# Patient Record
Sex: FEMALE | Race: BLACK OR AFRICAN AMERICAN | NOT HISPANIC OR LATINO
[De-identification: names, ages, dates, MRNs, and addresses within clinical notes are randomized per-mention and may not be internally consistent; named-entity substitution may affect disease eponyms.]

---

## 2017-03-15 ENCOUNTER — APPOINTMENT (OUTPATIENT)
Dept: OBGYN | Facility: CLINIC | Age: 27
End: 2017-03-15

## 2017-03-15 ENCOUNTER — APPOINTMENT (OUTPATIENT)
Dept: INTERNAL MEDICINE | Facility: CLINIC | Age: 27
End: 2017-03-15

## 2017-03-31 ENCOUNTER — OUTPATIENT (OUTPATIENT)
Dept: OUTPATIENT SERVICES | Facility: HOSPITAL | Age: 27
LOS: 1 days | Discharge: HOME | End: 2017-03-31

## 2017-03-31 ENCOUNTER — APPOINTMENT (OUTPATIENT)
Dept: OBGYN | Facility: CLINIC | Age: 27
End: 2017-03-31

## 2017-03-31 VITALS
BODY MASS INDEX: 27 KG/M2 | SYSTOLIC BLOOD PRESSURE: 98 MMHG | HEIGHT: 66 IN | DIASTOLIC BLOOD PRESSURE: 56 MMHG | WEIGHT: 168 LBS

## 2017-04-03 ENCOUNTER — RESULT REVIEW (OUTPATIENT)
Age: 27
End: 2017-04-03

## 2017-04-12 ENCOUNTER — APPOINTMENT (OUTPATIENT)
Dept: INTERNAL MEDICINE | Facility: CLINIC | Age: 27
End: 2017-04-12

## 2017-05-22 ENCOUNTER — APPOINTMENT (OUTPATIENT)
Dept: OBGYN | Facility: CLINIC | Age: 27
End: 2017-05-22

## 2017-05-22 ENCOUNTER — OUTPATIENT (OUTPATIENT)
Dept: OUTPATIENT SERVICES | Facility: HOSPITAL | Age: 27
LOS: 1 days | Discharge: HOME | End: 2017-05-22

## 2017-05-22 VITALS
HEIGHT: 66 IN | SYSTOLIC BLOOD PRESSURE: 100 MMHG | DIASTOLIC BLOOD PRESSURE: 60 MMHG | WEIGHT: 163 LBS | BODY MASS INDEX: 26.2 KG/M2

## 2017-05-26 LAB
A VAGINAE DNA VAG NAA+PROBE-LOG#: 6.8
BV SCORE VAG QL NAA+PROBE: ABNORMAL
C GLABRATA DNA VAG QL NAA+PROBE: NOT DETECTED
C PARAP DNA VAG QL NAA+PROBE: NOT DETECTED
C TRACH RRNA SPEC QL NAA+PROBE: NOT DETECTED
C TROPICLS DNA VAG QL NAA+PROBE: NOT DETECTED
CANDIDA DNA VAG QL NAA+PROBE: DETECTED
G VAGINALIS DNA VAG NAA+PROBE-LOG#: >8
LACTOBACILLUS DNA VAG NAA+PROBE-LOG#: NOT DETECTED
MEGASPHAERA SP DNA VAG NAA+PROBE-LOG#: NOT DETECTED
N GONORRHOEA RRNA SPEC QL NAA+PROBE: NOT DETECTED
T VAGINALIS RRNA SPEC QL NAA+PROBE: NOT DETECTED

## 2017-06-28 DIAGNOSIS — B37.3 CANDIDIASIS OF VULVA AND VAGINA: ICD-10-CM

## 2017-12-12 PROBLEM — Z00.00 ENCOUNTER FOR PREVENTIVE HEALTH EXAMINATION: Status: ACTIVE | Noted: 2017-12-12

## 2017-12-13 DIAGNOSIS — D25.9 LEIOMYOMA OF UTERUS, UNSPECIFIED: ICD-10-CM

## 2017-12-15 ENCOUNTER — APPOINTMENT (OUTPATIENT)
Dept: HUMAN REPRODUCTION | Facility: CLINIC | Age: 27
End: 2017-12-15

## 2017-12-26 ENCOUNTER — EMERGENCY (EMERGENCY)
Facility: HOSPITAL | Age: 27
LOS: 0 days | Discharge: HOME | End: 2017-12-27

## 2017-12-26 DIAGNOSIS — B37.3 CANDIDIASIS OF VULVA AND VAGINA: ICD-10-CM

## 2017-12-26 DIAGNOSIS — L29.8 OTHER PRURITUS: ICD-10-CM

## 2018-04-06 ENCOUNTER — OUTPATIENT (OUTPATIENT)
Dept: OUTPATIENT SERVICES | Facility: HOSPITAL | Age: 28
LOS: 1 days | Discharge: HOME | End: 2018-04-06

## 2018-04-06 ENCOUNTER — APPOINTMENT (OUTPATIENT)
Dept: OBGYN | Facility: CLINIC | Age: 28
End: 2018-04-06

## 2018-04-06 VITALS
HEIGHT: 66 IN | DIASTOLIC BLOOD PRESSURE: 66 MMHG | BODY MASS INDEX: 25.75 KG/M2 | WEIGHT: 160.25 LBS | SYSTOLIC BLOOD PRESSURE: 106 MMHG

## 2018-04-06 RX ORDER — FENUGREEK SEED/BL.THISTLE/ANIS 340 MG
18-0.8 & 29 CAPSULE ORAL
Qty: 90 | Refills: 3 | Status: ACTIVE | COMMUNITY
Start: 2018-04-06 | End: 1900-01-01

## 2018-04-09 DIAGNOSIS — Z01.419 ENCOUNTER FOR GYNECOLOGICAL EXAMINATION (GENERAL) (ROUTINE) WITHOUT ABNORMAL FINDINGS: ICD-10-CM

## 2018-04-09 DIAGNOSIS — D25.9 LEIOMYOMA OF UTERUS, UNSPECIFIED: ICD-10-CM

## 2018-04-20 ENCOUNTER — OUTPATIENT (OUTPATIENT)
Dept: OUTPATIENT SERVICES | Facility: HOSPITAL | Age: 28
LOS: 1 days | Discharge: HOME | End: 2018-04-20

## 2018-04-20 ENCOUNTER — APPOINTMENT (OUTPATIENT)
Dept: ANTEPARTUM | Facility: CLINIC | Age: 28
End: 2018-04-20

## 2018-04-20 VITALS
BODY MASS INDEX: 26.15 KG/M2 | TEMPERATURE: 97 F | HEART RATE: 77 BPM | OXYGEN SATURATION: 98 % | WEIGHT: 162 LBS | DIASTOLIC BLOOD PRESSURE: 69 MMHG | SYSTOLIC BLOOD PRESSURE: 115 MMHG

## 2018-04-20 DIAGNOSIS — D25.9 LEIOMYOMA OF UTERUS, UNSPECIFIED: ICD-10-CM

## 2018-04-20 DIAGNOSIS — N83.209 UNSPECIFIED OVARIAN CYST, UNSPECIFIED SIDE: ICD-10-CM

## 2018-04-20 LAB
HCG UR QL: NEGATIVE
QUALITY CONTROL: YES

## 2018-04-30 LAB — PROGEST SERPL-MCNC: 15.7 NG/ML

## 2018-06-21 ENCOUNTER — APPOINTMENT (OUTPATIENT)
Dept: INTERNAL MEDICINE | Facility: CLINIC | Age: 28
End: 2018-06-21

## 2018-06-21 ENCOUNTER — OUTPATIENT (OUTPATIENT)
Dept: OUTPATIENT SERVICES | Facility: HOSPITAL | Age: 28
LOS: 1 days | Discharge: HOME | End: 2018-06-21

## 2018-06-21 VITALS
SYSTOLIC BLOOD PRESSURE: 111 MMHG | HEIGHT: 66 IN | WEIGHT: 164 LBS | BODY MASS INDEX: 26.36 KG/M2 | HEART RATE: 94 BPM | DIASTOLIC BLOOD PRESSURE: 72 MMHG | TEMPERATURE: 98 F

## 2018-06-21 NOTE — ASSESSMENT
[FreeTextEntry1] : 28 y/o female with only a history of uterine fibroids, here for health maintenance visit.\par \par #Fibroids\par - asymptomatic currently\par - follows up with  and has an appointment coming up\par \par #HCM\par - pt family risk factors assessed\par - will order routine blood work\par - pt advised to come back in 9 to 12 months unless acute issue comes up\par

## 2018-06-21 NOTE — HISTORY OF PRESENT ILLNESS
[de-identified] : 26 y/o female with no pmh presents for evaluation and establishment of care with a pmd.  Patient has no complaints and currently follows up with a gynecologist for fibroids that she has had in the past.  No current issues otherwise.

## 2018-06-21 NOTE — PHYSICAL EXAM
[No Acute Distress] : no acute distress [Well Nourished] : well nourished [No JVD] : no jugular venous distention [Supple] : supple [No Respiratory Distress] : no respiratory distress  [Clear to Auscultation] : lungs were clear to auscultation bilaterally [Normal Rate] : normal rate  [Regular Rhythm] : with a regular rhythm [Normal S1, S2] : normal S1 and S2 [Soft] : abdomen soft [Non Tender] : non-tender [Normal Bowel Sounds] : normal bowel sounds [No CVA Tenderness] : no CVA  tenderness

## 2018-06-22 ENCOUNTER — LABORATORY RESULT (OUTPATIENT)
Age: 28
End: 2018-06-22

## 2018-06-22 DIAGNOSIS — Z00.00 ENCOUNTER FOR GENERAL ADULT MEDICAL EXAMINATION WITHOUT ABNORMAL FINDINGS: ICD-10-CM

## 2018-06-28 LAB — 25(OH)D3 SERPL-MCNC: 26 NG/ML

## 2018-07-06 ENCOUNTER — OUTPATIENT (OUTPATIENT)
Dept: OUTPATIENT SERVICES | Facility: HOSPITAL | Age: 28
LOS: 1 days | Discharge: HOME | End: 2018-07-06

## 2018-07-06 ENCOUNTER — APPOINTMENT (OUTPATIENT)
Dept: OBGYN | Facility: CLINIC | Age: 28
End: 2018-07-06

## 2018-07-06 ENCOUNTER — RESULT CHARGE (OUTPATIENT)
Age: 28
End: 2018-07-06

## 2018-07-06 VITALS — BODY MASS INDEX: 26.63 KG/M2 | WEIGHT: 165 LBS | DIASTOLIC BLOOD PRESSURE: 60 MMHG | SYSTOLIC BLOOD PRESSURE: 100 MMHG

## 2018-07-06 LAB — HCG UR QL: NEGATIVE

## 2018-07-10 DIAGNOSIS — D25.9 LEIOMYOMA OF UTERUS, UNSPECIFIED: ICD-10-CM

## 2018-07-12 ENCOUNTER — APPOINTMENT (OUTPATIENT)
Dept: OBGYN | Facility: CLINIC | Age: 28
End: 2018-07-12

## 2018-07-12 ENCOUNTER — OUTPATIENT (OUTPATIENT)
Dept: OUTPATIENT SERVICES | Facility: HOSPITAL | Age: 28
LOS: 1 days | Discharge: HOME | End: 2018-07-12

## 2018-07-12 VITALS
BODY MASS INDEX: 26.96 KG/M2 | DIASTOLIC BLOOD PRESSURE: 64 MMHG | SYSTOLIC BLOOD PRESSURE: 100 MMHG | WEIGHT: 167.06 LBS

## 2018-07-12 DIAGNOSIS — Z01.419 ENCOUNTER FOR GYNECOLOGICAL EXAMINATION (GENERAL) (ROUTINE) W/OUT ABNORMAL FINDINGS: ICD-10-CM

## 2018-07-12 DIAGNOSIS — Z78.9 OTHER SPECIFIED HEALTH STATUS: ICD-10-CM

## 2018-07-12 DIAGNOSIS — Z87.42 PERSONAL HISTORY OF OTHER DISEASES OF THE FEMALE GENITAL TRACT: ICD-10-CM

## 2018-07-12 DIAGNOSIS — Z86.19 PERSONAL HISTORY OF OTHER INFECTIOUS AND PARASITIC DISEASES: ICD-10-CM

## 2018-07-12 DIAGNOSIS — Z87.2 PERSONAL HISTORY OF DISEASES OF THE SKIN AND SUBCUTANEOUS TISSUE: ICD-10-CM

## 2018-07-16 DIAGNOSIS — N92.0 EXCESSIVE AND FREQUENT MENSTRUATION WITH REGULAR CYCLE: ICD-10-CM

## 2018-07-16 DIAGNOSIS — D25.9 LEIOMYOMA OF UTERUS, UNSPECIFIED: ICD-10-CM

## 2018-07-17 ENCOUNTER — OUTPATIENT (OUTPATIENT)
Dept: OUTPATIENT SERVICES | Facility: HOSPITAL | Age: 28
LOS: 1 days | Discharge: HOME | End: 2018-07-17

## 2018-07-17 VITALS
SYSTOLIC BLOOD PRESSURE: 113 MMHG | WEIGHT: 167.33 LBS | TEMPERATURE: 98 F | DIASTOLIC BLOOD PRESSURE: 68 MMHG | HEIGHT: 66 IN | OXYGEN SATURATION: 100 % | HEART RATE: 81 BPM

## 2018-07-17 DIAGNOSIS — Z01.818 ENCOUNTER FOR OTHER PREPROCEDURAL EXAMINATION: ICD-10-CM

## 2018-07-17 DIAGNOSIS — N82.0 VESICOVAGINAL FISTULA: ICD-10-CM

## 2018-07-17 DIAGNOSIS — D25.9 LEIOMYOMA OF UTERUS, UNSPECIFIED: ICD-10-CM

## 2018-07-17 LAB
ALBUMIN SERPL ELPH-MCNC: 4.6 G/DL — SIGNIFICANT CHANGE UP (ref 3.5–5.2)
ALP SERPL-CCNC: 41 U/L — SIGNIFICANT CHANGE UP (ref 30–115)
ALT FLD-CCNC: 11 U/L — SIGNIFICANT CHANGE UP (ref 0–41)
ANION GAP SERPL CALC-SCNC: 12 MMOL/L — SIGNIFICANT CHANGE UP (ref 7–14)
APPEARANCE UR: CLEAR — SIGNIFICANT CHANGE UP
APTT BLD: 34.4 SEC — SIGNIFICANT CHANGE UP (ref 27–39.2)
AST SERPL-CCNC: 18 U/L — SIGNIFICANT CHANGE UP (ref 0–41)
BILIRUB SERPL-MCNC: 0.2 MG/DL — SIGNIFICANT CHANGE UP (ref 0.2–1.2)
BILIRUB UR-MCNC: NEGATIVE — SIGNIFICANT CHANGE UP
BUN SERPL-MCNC: 15 MG/DL — SIGNIFICANT CHANGE UP (ref 10–20)
CALCIUM SERPL-MCNC: 9.7 MG/DL — SIGNIFICANT CHANGE UP (ref 8.5–10.1)
CHLORIDE SERPL-SCNC: 101 MMOL/L — SIGNIFICANT CHANGE UP (ref 98–110)
CO2 SERPL-SCNC: 28 MMOL/L — SIGNIFICANT CHANGE UP (ref 17–32)
COLOR SPEC: YELLOW — SIGNIFICANT CHANGE UP
CREAT SERPL-MCNC: 0.6 MG/DL — LOW (ref 0.7–1.5)
DIFF PNL FLD: NEGATIVE — SIGNIFICANT CHANGE UP
GLUCOSE SERPL-MCNC: 86 MG/DL — SIGNIFICANT CHANGE UP (ref 70–99)
GLUCOSE UR QL: NEGATIVE MG/DL — SIGNIFICANT CHANGE UP
HCT VFR BLD CALC: 35.2 % — LOW (ref 37–47)
HGB BLD-MCNC: 11.3 G/DL — LOW (ref 12–16)
INR BLD: 1.17 RATIO — SIGNIFICANT CHANGE UP (ref 0.65–1.3)
KETONES UR-MCNC: NEGATIVE — SIGNIFICANT CHANGE UP
LEUKOCYTE ESTERASE UR-ACNC: NEGATIVE — SIGNIFICANT CHANGE UP
MCHC RBC-ENTMCNC: 26.1 PG — LOW (ref 27–31)
MCHC RBC-ENTMCNC: 32.1 G/DL — SIGNIFICANT CHANGE UP (ref 32–37)
MCV RBC AUTO: 81.3 FL — SIGNIFICANT CHANGE UP (ref 81–99)
NITRITE UR-MCNC: NEGATIVE — SIGNIFICANT CHANGE UP
NRBC # BLD: 0 /100 WBCS — SIGNIFICANT CHANGE UP (ref 0–0)
PH UR: 6 — SIGNIFICANT CHANGE UP (ref 5–8)
PLATELET # BLD AUTO: 327 K/UL — SIGNIFICANT CHANGE UP (ref 130–400)
POTASSIUM SERPL-MCNC: 4.6 MMOL/L — SIGNIFICANT CHANGE UP (ref 3.5–5)
POTASSIUM SERPL-SCNC: 4.6 MMOL/L — SIGNIFICANT CHANGE UP (ref 3.5–5)
PROT SERPL-MCNC: 7 G/DL — SIGNIFICANT CHANGE UP (ref 6–8)
PROT UR-MCNC: NEGATIVE MG/DL — SIGNIFICANT CHANGE UP
PROTHROM AB SERPL-ACNC: 12.6 SEC — SIGNIFICANT CHANGE UP (ref 9.95–12.87)
RBC # BLD: 4.33 M/UL — SIGNIFICANT CHANGE UP (ref 4.2–5.4)
RBC # FLD: 12.4 % — SIGNIFICANT CHANGE UP (ref 11.5–14.5)
SODIUM SERPL-SCNC: 141 MMOL/L — SIGNIFICANT CHANGE UP (ref 135–146)
SP GR SPEC: 1.01 — SIGNIFICANT CHANGE UP (ref 1.01–1.03)
UROBILINOGEN FLD QL: 0.2 MG/DL — SIGNIFICANT CHANGE UP (ref 0.2–0.2)
WBC # BLD: 5.91 K/UL — SIGNIFICANT CHANGE UP (ref 4.8–10.8)
WBC # FLD AUTO: 5.91 K/UL — SIGNIFICANT CHANGE UP (ref 4.8–10.8)

## 2018-07-17 NOTE — H&P PST ADULT - ATTENDING COMMENTS
Risks, benefit and alternatives discussed in details. All questions answered. Patient verbalized understanding.  On call for operative hysteroscopy/ Myosure myomectomy, curettage. On call for operative hysteroscopy/ Myosure myomectomy, curettage.  Patient declines blood transfusion, excessive blood loss not anticipated.  Risks, benefit and alternatives discussed in details. All questions answered. Patient verbalized understanding.

## 2018-07-17 NOTE — H&P PST ADULT - HISTORY OF PRESENT ILLNESS
27 year old female found out in 2016 with fibroid was having pain in lower abdomen. patient states it comes and goes ,and is trying to get pregnant so needs fibroid removed.  fos= 2-3 denies chest pain sob palp  denies recent uri or uti  PT DENEIS ANY RASHES, ABRASION, OR OPEN WOUNDS OR CUTS

## 2018-07-17 NOTE — H&P PST ADULT - NSANTHOSAYNRD_GEN_A_CORE
No. RODDY screening performed.  STOP BANG Legend: 0-2 = LOW Risk; 3-4 = INTERMEDIATE Risk; 5-8 = HIGH Risk/see roddy tool

## 2018-07-18 LAB
T3 SERPL-MCNC: 110 NG/DL — SIGNIFICANT CHANGE UP (ref 80–200)
T4 AB SER-ACNC: 6.9 UG/DL — SIGNIFICANT CHANGE UP (ref 4.6–12)
TSH SERPL-MCNC: 2.96 UIU/ML — SIGNIFICANT CHANGE UP (ref 0.27–4.2)

## 2018-07-19 ENCOUNTER — OUTPATIENT (OUTPATIENT)
Dept: OUTPATIENT SERVICES | Facility: HOSPITAL | Age: 28
LOS: 1 days | Discharge: HOME | End: 2018-07-19

## 2018-07-19 DIAGNOSIS — D25.9 LEIOMYOMA OF UTERUS, UNSPECIFIED: ICD-10-CM

## 2018-07-23 ENCOUNTER — OUTPATIENT (OUTPATIENT)
Dept: OUTPATIENT SERVICES | Facility: HOSPITAL | Age: 28
LOS: 1 days | Discharge: HOME | End: 2018-07-23

## 2018-07-23 ENCOUNTER — RESULT REVIEW (OUTPATIENT)
Age: 28
End: 2018-07-23

## 2018-07-23 VITALS
DIASTOLIC BLOOD PRESSURE: 70 MMHG | SYSTOLIC BLOOD PRESSURE: 118 MMHG | RESPIRATION RATE: 18 BRPM | HEART RATE: 70 BPM | OXYGEN SATURATION: 100 %

## 2018-07-23 VITALS — HEIGHT: 66 IN | WEIGHT: 166.89 LBS

## 2018-07-23 RX ORDER — HYDROMORPHONE HYDROCHLORIDE 2 MG/ML
0.5 INJECTION INTRAMUSCULAR; INTRAVENOUS; SUBCUTANEOUS
Qty: 0 | Refills: 0 | Status: DISCONTINUED | OUTPATIENT
Start: 2018-07-23 | End: 2018-07-30

## 2018-07-23 RX ORDER — SODIUM CHLORIDE 9 MG/ML
1000 INJECTION, SOLUTION INTRAVENOUS
Qty: 0 | Refills: 0 | Status: DISCONTINUED | OUTPATIENT
Start: 2018-07-23 | End: 2018-08-07

## 2018-07-23 RX ORDER — OXYCODONE HYDROCHLORIDE 5 MG/1
5 TABLET ORAL ONCE
Qty: 0 | Refills: 0 | Status: DISCONTINUED | OUTPATIENT
Start: 2018-07-23 | End: 2018-07-23

## 2018-07-23 RX ORDER — ONDANSETRON 8 MG/1
4 TABLET, FILM COATED ORAL ONCE
Qty: 0 | Refills: 0 | Status: DISCONTINUED | OUTPATIENT
Start: 2018-07-23 | End: 2018-08-07

## 2018-07-23 RX ORDER — KETOROLAC TROMETHAMINE 30 MG/ML
30 SYRINGE (ML) INJECTION ONCE
Qty: 0 | Refills: 0 | Status: DISCONTINUED | OUTPATIENT
Start: 2018-07-23 | End: 2018-07-23

## 2018-07-23 NOTE — ASU DISCHARGE PLAN (ADULT/PEDIATRIC). - NURSING INSTRUCTIONS
- Nothing in the vagina for 6 weeks; no sex, no tampons, no douching  - follow up with PMD in 10-14 days  - In case of fever, excessive bleeding or severe abdominal pain, please go to the emergency department

## 2018-07-23 NOTE — ASU DISCHARGE PLAN (ADULT/PEDIATRIC). - ASU FOLLOWUP
911 or go to the nearest Emergency Room [facility]:  [place] [facility]...

## 2018-07-23 NOTE — BRIEF OPERATIVE NOTE - PROCEDURE
<<-----Click on this checkbox to enter Procedure Diagnostic hysteroscopy with dilation and curettage of uterus  07/23/2018    Active  HIMA

## 2018-07-23 NOTE — BRIEF OPERATIVE NOTE - COMMENTS
D/c home when patient awake, alert and is cleared by the PACU team D/c home when patient awake, alert and is cleared by the PACU team    Dictation number:[id number]

## 2018-07-24 PROBLEM — D25.9 LEIOMYOMA OF UTERUS, UNSPECIFIED: Chronic | Status: ACTIVE | Noted: 2018-07-17

## 2018-07-24 LAB — SURGICAL PATHOLOGY STUDY: SIGNIFICANT CHANGE UP

## 2018-07-26 DIAGNOSIS — N93.9 ABNORMAL UTERINE AND VAGINAL BLEEDING, UNSPECIFIED: ICD-10-CM

## 2018-07-31 ENCOUNTER — APPOINTMENT (OUTPATIENT)
Dept: OBGYN | Facility: CLINIC | Age: 28
End: 2018-07-31

## 2018-07-31 VITALS
SYSTOLIC BLOOD PRESSURE: 110 MMHG | BODY MASS INDEX: 27.08 KG/M2 | DIASTOLIC BLOOD PRESSURE: 70 MMHG | HEIGHT: 66 IN | WEIGHT: 168.5 LBS

## 2018-07-31 LAB
CHOLEST SERPL-MCNC: 127 MG/DL
CHOLEST/HDLC SERPL: 2.4 RATIO
HDLC SERPL-MCNC: 53 MG/DL
LDLC SERPL CALC-MCNC: 70 MG/DL
TRIGL SERPL-MCNC: 35 MG/DL

## 2018-08-23 ENCOUNTER — OUTPATIENT (OUTPATIENT)
Dept: OUTPATIENT SERVICES | Facility: HOSPITAL | Age: 28
LOS: 1 days | Discharge: HOME | End: 2018-08-23

## 2018-11-13 ENCOUNTER — EMERGENCY (EMERGENCY)
Facility: HOSPITAL | Age: 28
LOS: 0 days | Discharge: HOME | End: 2018-11-13
Attending: EMERGENCY MEDICINE | Admitting: EMERGENCY MEDICINE

## 2018-11-13 VITALS — SYSTOLIC BLOOD PRESSURE: 114 MMHG | HEART RATE: 81 BPM | DIASTOLIC BLOOD PRESSURE: 65 MMHG

## 2018-11-13 VITALS
DIASTOLIC BLOOD PRESSURE: 74 MMHG | OXYGEN SATURATION: 95 % | TEMPERATURE: 98 F | RESPIRATION RATE: 18 BRPM | SYSTOLIC BLOOD PRESSURE: 130 MMHG | HEART RATE: 104 BPM

## 2018-11-13 DIAGNOSIS — N83.209 UNSPECIFIED OVARIAN CYST, UNSPECIFIED SIDE: ICD-10-CM

## 2018-11-13 DIAGNOSIS — D25.9 LEIOMYOMA OF UTERUS, UNSPECIFIED: ICD-10-CM

## 2018-11-13 DIAGNOSIS — R10.9 UNSPECIFIED ABDOMINAL PAIN: ICD-10-CM

## 2018-11-13 LAB
ALBUMIN SERPL ELPH-MCNC: 4.5 G/DL — SIGNIFICANT CHANGE UP (ref 3.5–5.2)
ALP SERPL-CCNC: 40 U/L — SIGNIFICANT CHANGE UP (ref 30–115)
ALT FLD-CCNC: 9 U/L — SIGNIFICANT CHANGE UP (ref 0–41)
ANION GAP SERPL CALC-SCNC: 11 MMOL/L — SIGNIFICANT CHANGE UP (ref 7–14)
APPEARANCE UR: ABNORMAL
AST SERPL-CCNC: 17 U/L — SIGNIFICANT CHANGE UP (ref 0–41)
BASOPHILS # BLD AUTO: 0.04 K/UL — SIGNIFICANT CHANGE UP (ref 0–0.2)
BASOPHILS NFR BLD AUTO: 0.7 % — SIGNIFICANT CHANGE UP (ref 0–1)
BILIRUB SERPL-MCNC: 0.3 MG/DL — SIGNIFICANT CHANGE UP (ref 0.2–1.2)
BILIRUB UR-MCNC: NEGATIVE — SIGNIFICANT CHANGE UP
BUN SERPL-MCNC: 8 MG/DL — LOW (ref 10–20)
CALCIUM SERPL-MCNC: 9.6 MG/DL — SIGNIFICANT CHANGE UP (ref 8.5–10.1)
CHLORIDE SERPL-SCNC: 107 MMOL/L — SIGNIFICANT CHANGE UP (ref 98–110)
CO2 SERPL-SCNC: 26 MMOL/L — SIGNIFICANT CHANGE UP (ref 17–32)
COLOR SPEC: YELLOW — SIGNIFICANT CHANGE UP
CREAT SERPL-MCNC: 0.6 MG/DL — LOW (ref 0.7–1.5)
DIFF PNL FLD: NEGATIVE — SIGNIFICANT CHANGE UP
EOSINOPHIL # BLD AUTO: 0.05 K/UL — SIGNIFICANT CHANGE UP (ref 0–0.7)
EOSINOPHIL NFR BLD AUTO: 0.9 % — SIGNIFICANT CHANGE UP (ref 0–8)
EPI CELLS # UR: ABNORMAL /HPF
GLUCOSE SERPL-MCNC: 90 MG/DL — SIGNIFICANT CHANGE UP (ref 70–99)
GLUCOSE UR QL: NEGATIVE MG/DL — SIGNIFICANT CHANGE UP
HCT VFR BLD CALC: 34 % — LOW (ref 37–47)
HGB BLD-MCNC: 11 G/DL — LOW (ref 12–16)
IMM GRANULOCYTES NFR BLD AUTO: 0 % — LOW (ref 0.1–0.3)
KETONES UR-MCNC: NEGATIVE — SIGNIFICANT CHANGE UP
LEUKOCYTE ESTERASE UR-ACNC: NEGATIVE — SIGNIFICANT CHANGE UP
LIDOCAIN IGE QN: 25 U/L — SIGNIFICANT CHANGE UP (ref 7–60)
LYMPHOCYTES # BLD AUTO: 2.24 K/UL — SIGNIFICANT CHANGE UP (ref 1.2–3.4)
LYMPHOCYTES # BLD AUTO: 40.5 % — SIGNIFICANT CHANGE UP (ref 20.5–51.1)
MCHC RBC-ENTMCNC: 26.1 PG — LOW (ref 27–31)
MCHC RBC-ENTMCNC: 32.4 G/DL — SIGNIFICANT CHANGE UP (ref 32–37)
MCV RBC AUTO: 80.8 FL — LOW (ref 81–99)
MONOCYTES # BLD AUTO: 0.58 K/UL — SIGNIFICANT CHANGE UP (ref 0.1–0.6)
MONOCYTES NFR BLD AUTO: 10.5 % — HIGH (ref 1.7–9.3)
NEUTROPHILS # BLD AUTO: 2.62 K/UL — SIGNIFICANT CHANGE UP (ref 1.4–6.5)
NEUTROPHILS NFR BLD AUTO: 47.4 % — SIGNIFICANT CHANGE UP (ref 42.2–75.2)
NITRITE UR-MCNC: NEGATIVE — SIGNIFICANT CHANGE UP
NRBC # BLD: 0 /100 WBCS — SIGNIFICANT CHANGE UP (ref 0–0)
PH UR: 7 — SIGNIFICANT CHANGE UP (ref 5–8)
PLATELET # BLD AUTO: 246 K/UL — SIGNIFICANT CHANGE UP (ref 130–400)
POTASSIUM SERPL-MCNC: 4.6 MMOL/L — SIGNIFICANT CHANGE UP (ref 3.5–5)
POTASSIUM SERPL-SCNC: 4.6 MMOL/L — SIGNIFICANT CHANGE UP (ref 3.5–5)
PROT SERPL-MCNC: 6.9 G/DL — SIGNIFICANT CHANGE UP (ref 6–8)
PROT UR-MCNC: NEGATIVE MG/DL — SIGNIFICANT CHANGE UP
RBC # BLD: 4.21 M/UL — SIGNIFICANT CHANGE UP (ref 4.2–5.4)
RBC # FLD: 12.7 % — SIGNIFICANT CHANGE UP (ref 11.5–14.5)
SODIUM SERPL-SCNC: 144 MMOL/L — SIGNIFICANT CHANGE UP (ref 135–146)
SP GR SPEC: 1.01 — SIGNIFICANT CHANGE UP (ref 1.01–1.03)
UROBILINOGEN FLD QL: 0.2 MG/DL — SIGNIFICANT CHANGE UP (ref 0.2–0.2)
WBC # BLD: 5.53 K/UL — SIGNIFICANT CHANGE UP (ref 4.8–10.8)
WBC # FLD AUTO: 5.53 K/UL — SIGNIFICANT CHANGE UP (ref 4.8–10.8)

## 2018-11-13 NOTE — ED PROVIDER NOTE - PHYSICAL EXAMINATION
GEN: Alert & Oriented x 3, No acute distress. Calm, appropriate.  Head and Neck: Normocephalic, atraumatic. No cervical lymphadenopathy. Trachea midline. No thyromegaly.  ENT:Oral mucosa pink, moist without lesions. No pharyngeal injection noted. No exudate. TM clear bilaterally.  Eyes: PERRLA. EOMI. No conjunctival injection. No scleral icterus. Vision 20/20  RESP: Lungs clear to auscult bilat. no wheezes, rhonchi or rales. No retractions. Equal air entry.  CARDIO: regular rate and rhythm, no murmurs, rubs or gallops. Normal S1, S2.  Radial pulses 2+ bilaterally. No lower extremity edema.  ABD: Soft, Nondistended. BS +4Q. No rebound tenderness/guarding. No pulsatile mass. tenderness with light and deep palpation of RLQ and suprapubic area. Slight tenderness over LLQ.   MS: Full ROM of extremities.  SKIN: no rashes/lesions, no petechiae, no ecchymosis.  NEURO: CN II-XII grossly intact. Speech and cognition normal. GEN: Alert & Oriented x 3, No acute distress. Calm, appropriate.  Eyes: PERRL. No conjunctival injection. No scleral icterus.   RESP: Lungs clear to auscult bilat. no wheezes, rhonchi or rales. No retractions. Equal air entry.  CARDIO: regular rate and rhythm, no murmurs, rubs or gallops. Normal S1, S2. Radial pulses 2+ bilaterally.   ABD: Soft, Nondistended. BS +4Q. No rebound tenderness/guarding. No pulsatile mass. tenderness with light and deep palpation of RLQ and suprapubic area. Slight tenderness over LLQ. No CVA tenderness.  : no masses, lesions or swelling. Vaginal mucosa pink and moist with rugae present. No unusual odors. Cream colored discharge present. Cervix smooth, firm and mobile. (+) cervical motion tenderness. ovaries not palpable. No adnexal tenderness. Uterus not enlarged.   MS: Full ROM of extremities.  SKIN: no rashes/lesions, no petechiae, no ecchymosis.  NEURO: CN II-XII grossly intact. Speech and cognition normal.

## 2018-11-13 NOTE — ED PROVIDER NOTE - ATTENDING CONTRIBUTION TO CARE
28 y.o. Female, PMH of uterine fibroid, comes in c/o RLQ abdominal pain which started yesterday and has been getting worse. Denies n/v/d, f/c, CP/SOBm vaginal bleeding/discharge, hematuria or flank pain. LMP 9/3/18. On exam, pt in NAD, AAOx3, head NC/AT, CN II-XII intact, lungs CTA B/L, CV S1S2 regular, abdomen soft/(+) RLQ abdominal pain/ND/(+)BS,  as per PAs note, ext (-) edema, motor 5/5x4, sensation intact. Will do labs, US and reevaluate.

## 2018-11-13 NOTE — ED PROVIDER NOTE - PROGRESS NOTE DETAILS
Pt signed out to Dr. Sim pending labs, US, reevaluation, and dispo. I was directly involved in the care of this patient. PA Ebonie Lewis note/plan reviewed and agreed. ADDENDUM by Myra Sim M.D.: I received sign-off from Dr. Bernstein. 28yoF with h/o fibroids presents with RLQ cramping, exam suspicious for fibroids with low suspicion for appe, I was to f/u U/S and reassess. U/S shows fibroids and hemorrhagic cyst. Abdomen benign, NAD, well appearing, comfortable, denies dizziness or vomiting or vaginal bleeding. Discharged with return precautions, OB f/u.

## 2018-11-13 NOTE — ED PROVIDER NOTE - CARE PLAN
Principal Discharge DX:	Hemorrhagic ovarian cyst  Secondary Diagnosis:	Fibroids  Secondary Diagnosis:	RLQ abdominal pain

## 2018-11-13 NOTE — ED PROVIDER NOTE - OBJECTIVE STATEMENT
The patient is a 28y Female with PMH of uterine fibroid presenting to the ED with right lower quadrant pain x 1 day. Patient states she developed dull RLQ abdominal pain last night that has been increasing, non-radiating and cramping in nature. Notes some urinary urgency and constipation. She denies n/v/d, fever, chills, vaginal bleeding, vaginal discharge, hematuria, & flank pain. Her LMP was sept 3. She is sexually active with one male partner, she does not use any barrier protection or hormonal contraceptives. She is not concerned about sexually transmitted infections.

## 2018-11-13 NOTE — ED ADULT NURSE NOTE - NSIMPLEMENTINTERV_GEN_ALL_ED
Implemented All Universal Safety Interventions:  [place] to call system. Call bell, personal items and telephone within reach. Instruct patient to call for assistance. Room bathroom lighting operational. Non-slip footwear when patient is off stretcher. Physically safe environment: no spills, clutter or unnecessary equipment. Stretcher in lowest position, wheels locked, appropriate side rails in place.

## 2018-11-13 NOTE — ED PROVIDER NOTE - NS ED ROS FT
GEN: (-) fever, (-)chills, (-) malaise, (-) decreased appetite   HEENT: (-) vision changes, (-) HA, (-) sore throat, (-) ear pain, (-) epistaxis , (-) tinnitus   CV: (-) chest pain, (-) palpitations  PULM: (-) cough, (-) wheezing, (-) dyspnea  GI: (+) abdominal pain,(-) Nausea, (-) Vomiting, (-) Diarrhea, (-) Melena, (+) constipation  NEURO: (-) weakness, (-) paresthesias, (-) syncope  : (-) dysuria, (-) frequency, (+) urgency, (-) hematuria   MS: (-) back pain, (-) joint pain, (-)myalgias, (-) swelling  SKIN: (-) rashes, (-) new lesions, (-) jaundice  HEME: (-) bleeding, (-) ecchymosis

## 2019-01-27 NOTE — H&P PST ADULT - PAIN CHRONIC, PROFILE
no
Patient has chronic thrombocytopenia of unknown origin. It has ostensibly never been evaluated. I do not see it mentioned on notes from previous admissions, either.   - continue to monitor CBC

## 2019-01-29 ENCOUNTER — EMERGENCY (EMERGENCY)
Facility: HOSPITAL | Age: 29
LOS: 0 days | Discharge: HOME | End: 2019-01-29
Admitting: PHYSICIAN ASSISTANT

## 2019-01-29 VITALS
TEMPERATURE: 96 F | RESPIRATION RATE: 18 BRPM | SYSTOLIC BLOOD PRESSURE: 120 MMHG | HEART RATE: 98 BPM | WEIGHT: 164.91 LBS | DIASTOLIC BLOOD PRESSURE: 71 MMHG | OXYGEN SATURATION: 100 %
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
## 2019-01-29 DIAGNOSIS — Z04.1 ENCOUNTER FOR EXAMINATION AND OBSERVATION FOLLOWING TRANSPORT ACCIDENT: ICD-10-CM
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
## 2019-01-29 NOTE — ED PROVIDER NOTE - PHYSICAL EXAMINATION
GENERAL:  well appearing, non-toxic female in no acute distress  SKIN: skin warm, pink and dry. MMM. no signs of trauma. no seatbelt sign.   HEAD: NC, AT  NECK: Neck supple. No midline cervical tenderness. FROM of neck  PULM: CTAB. Normal respiratory effort. No respiratory distress. No wheezes, stridor, rales or rhonchi. No retractions  CV: RRR, no M/R/G.   ABD: Soft, non-tender, non-distended  MSK: FROM of all extremities.  No MSK tenderness. no midline vertebral tenderness. No edema, erythema, cyanosis. radial pulses equal and intact bilaterally.   NEURO: A+Ox3, no sensory/motor deficits, CN II-XII intact. No speech slurring, facial asymmetry. Normal finger-to-nose  b/l. 5/5 strength throughout. Normal gait.
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
## 2019-02-28 ENCOUNTER — OUTPATIENT (OUTPATIENT)
Dept: OUTPATIENT SERVICES | Facility: HOSPITAL | Age: 29
LOS: 1 days | Discharge: HOME | End: 2019-02-28

## 2019-02-28 ENCOUNTER — APPOINTMENT (OUTPATIENT)
Dept: OBGYN | Facility: CLINIC | Age: 29
End: 2019-02-28

## 2019-02-28 VITALS
WEIGHT: 172.19 LBS | DIASTOLIC BLOOD PRESSURE: 64 MMHG | HEIGHT: 66 IN | SYSTOLIC BLOOD PRESSURE: 118 MMHG | BODY MASS INDEX: 27.67 KG/M2

## 2019-02-28 DIAGNOSIS — D25.9 LEIOMYOMA OF UTERUS, UNSPECIFIED: ICD-10-CM

## 2019-02-28 NOTE — CHIEF COMPLAINT
[FreeTextEntry1] : Pt here for follow up. Pt has decided that she wants to proceed with evaluation for myomectomy. Pt's periods are regular. Pt and partner have been trying to conceive for over 1 year without success.

## 2019-03-14 ENCOUNTER — TRANSCRIPTION ENCOUNTER (OUTPATIENT)
Age: 29
End: 2019-03-14

## 2019-03-21 ENCOUNTER — OUTPATIENT (OUTPATIENT)
Dept: OUTPATIENT SERVICES | Facility: HOSPITAL | Age: 29
LOS: 1 days | Discharge: HOME | End: 2019-03-21

## 2019-03-21 DIAGNOSIS — D57.3 SICKLE-CELL TRAIT: ICD-10-CM

## 2019-04-22 ENCOUNTER — TRANSCRIPTION ENCOUNTER (OUTPATIENT)
Age: 29
End: 2019-04-22

## 2019-05-09 LAB
24R-OH-CALCIDIOL SERPL-MCNC: 58.5 PG/ML
ALBUMIN SERPL ELPH-MCNC: 4.7 G/DL
ALP BLD-CCNC: 39 U/L
ALT SERPL-CCNC: 8 U/L
ANION GAP SERPL CALC-SCNC: 13 MMOL/L
AST SERPL-CCNC: 18 U/L
BASOPHILS # BLD AUTO: 0.05 K/UL
BASOPHILS NFR BLD AUTO: 1.2 %
BILIRUB SERPL-MCNC: 0.4 MG/DL
BUN SERPL-MCNC: 6 MG/DL
CALCIUM SERPL-MCNC: 9.3 MG/DL
CHLORIDE SERPL-SCNC: 103 MMOL/L
CO2 SERPL-SCNC: 25 MMOL/L
CREAT SERPL-MCNC: 0.6 MG/DL
EOSINOPHIL # BLD AUTO: 0.05 K/UL
EOSINOPHIL NFR BLD AUTO: 1.2 %
GLUCOSE SERPL-MCNC: 77 MG/DL
HCT VFR BLD CALC: 36.2 %
HGB BLD-MCNC: 11.3 G/DL
IMM GRANULOCYTES NFR BLD AUTO: 0.2 %
LYMPHOCYTES # BLD AUTO: 2.1 K/UL
LYMPHOCYTES NFR BLD AUTO: 48.6 %
MAN DIFF?: NORMAL
MCHC RBC-ENTMCNC: 25.9 PG
MCHC RBC-ENTMCNC: 31.2 G/DL
MCV RBC AUTO: 82.8 FL
MONOCYTES # BLD AUTO: 0.43 K/UL
MONOCYTES NFR BLD AUTO: 10 %
NEUTROPHILS # BLD AUTO: 1.68 K/UL
NEUTROPHILS NFR BLD AUTO: 38.8 %
PLATELET # BLD AUTO: 303 K/UL
POTASSIUM SERPL-SCNC: 4.4 MMOL/L
PROT SERPL-MCNC: 7.2 G/DL
RBC # BLD: 4.37 M/UL
RBC # FLD: 12.4 %
SODIUM SERPL-SCNC: 141 MMOL/L
TSH SERPL-ACNC: 1.43 UIU/ML
WBC # FLD AUTO: 4.32 K/UL

## 2019-05-16 ENCOUNTER — APPOINTMENT (OUTPATIENT)
Dept: INTERNAL MEDICINE | Facility: CLINIC | Age: 29
End: 2019-05-16

## 2019-05-16 ENCOUNTER — OUTPATIENT (OUTPATIENT)
Dept: OUTPATIENT SERVICES | Facility: HOSPITAL | Age: 29
LOS: 1 days | Discharge: HOME | End: 2019-05-16

## 2019-05-16 VITALS
TEMPERATURE: 96.2 F | BODY MASS INDEX: 27.32 KG/M2 | DIASTOLIC BLOOD PRESSURE: 74 MMHG | WEIGHT: 170 LBS | SYSTOLIC BLOOD PRESSURE: 109 MMHG | HEIGHT: 66 IN | HEART RATE: 75 BPM

## 2019-05-16 DIAGNOSIS — R39.9 UNSPECIFIED SYMPTOMS AND SIGNS INVOLVING THE GENITOURINARY SYSTEM: ICD-10-CM

## 2019-05-16 DIAGNOSIS — M54.2 CERVICALGIA: ICD-10-CM

## 2019-05-16 RX ORDER — METRONIDAZOLE 500 MG/1
500 TABLET ORAL
Qty: 14 | Refills: 0 | Status: DISCONTINUED | COMMUNITY
Start: 2017-05-26 | End: 2019-05-16

## 2019-05-16 RX ORDER — BACITRACIN 500 [IU]/G
500 OINTMENT TOPICAL TWICE DAILY
Qty: 1 | Refills: 0 | Status: DISCONTINUED | COMMUNITY
Start: 2018-04-06 | End: 2019-05-16

## 2019-05-16 RX ORDER — TERCONAZOLE 4 MG/G
0.4 CREAM VAGINAL
Qty: 20 | Refills: 0 | Status: DISCONTINUED | COMMUNITY
Start: 2017-05-26 | End: 2019-05-16

## 2019-05-16 NOTE — REVIEW OF SYSTEMS
[Dizziness] : dizziness [Negative] : Integumentary [FreeTextEntry7] : See HPI [FreeTextEntry8] : See HPI [de-identified] : transient orthostatic related dizziness   [FreeTextEntry9] : See HPI

## 2019-05-16 NOTE — PHYSICAL EXAM
[Well Developed] : well developed [No Acute Distress] : no acute distress [Well Nourished] : well nourished [Well-Appearing] : well-appearing [Normal Sclera/Conjunctiva] : normal sclera/conjunctiva [Normal Oropharynx] : the oropharynx was normal [EOMI] : extraocular movements intact [PERRL] : pupils equal round and reactive to light [Thyroid Normal, No Nodules] : the thyroid was normal and there were no nodules present [No Lymphadenopathy] : no lymphadenopathy [Supple] : supple [No Respiratory Distress] : no respiratory distress  [No Accessory Muscle Use] : no accessory muscle use [Clear to Auscultation] : lungs were clear to auscultation bilaterally [Normal Rate] : normal rate  [Regular Rhythm] : with a regular rhythm [No Edema] : there was no peripheral edema [Normal S1, S2] : normal S1 and S2 [No Murmur] : no murmur heard [Soft] : abdomen soft [Non-distended] : non-distended [Non Tender] : non-tender [Normal Anterior Cervical Nodes] : no anterior cervical lymphadenopathy [Normal Posterior Cervical Nodes] : no posterior cervical lymphadenopathy [No Rash] : no rash [No Focal Deficits] : no focal deficits [de-identified] : mild tenderness over cervical spine, [de-identified] : see neck exam
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
## 2019-05-17 DIAGNOSIS — D64.9 ANEMIA, UNSPECIFIED: ICD-10-CM

## 2019-05-17 DIAGNOSIS — R39.9 UNSPECIFIED SYMPTOMS AND SIGNS INVOLVING THE GENITOURINARY SYSTEM: ICD-10-CM

## 2019-05-17 DIAGNOSIS — M54.2 CERVICALGIA: ICD-10-CM

## 2019-06-06 ENCOUNTER — APPOINTMENT (OUTPATIENT)
Dept: OBGYN | Facility: CLINIC | Age: 29
End: 2019-06-06
Payer: COMMERCIAL

## 2019-06-06 ENCOUNTER — OUTPATIENT (OUTPATIENT)
Dept: OUTPATIENT SERVICES | Facility: HOSPITAL | Age: 29
LOS: 1 days | Discharge: HOME | End: 2019-06-06

## 2019-06-06 VITALS
SYSTOLIC BLOOD PRESSURE: 108 MMHG | BODY MASS INDEX: 27.32 KG/M2 | HEIGHT: 66 IN | DIASTOLIC BLOOD PRESSURE: 64 MMHG | WEIGHT: 170 LBS

## 2019-06-06 PROCEDURE — 99395 PREV VISIT EST AGE 18-39: CPT
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
## 2019-06-06 NOTE — PHYSICAL EXAM
[Awake] : awake [Alert] : alert [Acute Distress] : no acute distress [Mass] : no breast mass [Axillary LAD] : no axillary lymphadenopathy [Nipple Discharge] : no nipple discharge [Soft] : soft [Tender] : non tender [H/Smegaly] : no hepatosplenomegaly [Distended] : not distended [Labia Majora] : labia major [Normal] : clitoris [Labia Minora] : labia minora [No Bleeding] : there was no active vaginal bleeding [de-identified] : folliculitis present over mons pubis

## 2019-06-06 NOTE — CHIEF COMPLAINT
[Annual Visit] : annual visit [FreeTextEntry1] : Pt scheduled to see NICOLASA tomorrow to schedule myomectomy. Pt otherwise without complaint

## 2019-06-06 NOTE — HISTORY OF PRESENT ILLNESS
[Good] : being in good health [Last Pap ___] : Last cervical pap smear was [unfilled] [Reproductive Age] : is of reproductive age [de-identified] : up to date [Definite:  ___ (Date)] : the last menstrual period was [unfilled] [Normal Amount/Duration] : was of a normal amount and duration [Spotting Between  Menses] : no spotting between menses [Regular Cycle Intervals] : periods have been regular [Monogamous] : is monogamous [Male ___] : [unfilled] male [Sexually Active] : is sexually active

## 2019-06-07 DIAGNOSIS — Z01.419 ENCOUNTER FOR GYNECOLOGICAL EXAMINATION (GENERAL) (ROUTINE) WITHOUT ABNORMAL FINDINGS: ICD-10-CM

## 2019-07-25 ENCOUNTER — OUTPATIENT (OUTPATIENT)
Dept: OUTPATIENT SERVICES | Facility: HOSPITAL | Age: 29
LOS: 1 days | Discharge: HOME | End: 2019-07-25

## 2019-07-25 DIAGNOSIS — N93.8 OTHER SPECIFIED ABNORMAL UTERINE AND VAGINAL BLEEDING: ICD-10-CM

## 2019-09-06 ENCOUNTER — OUTPATIENT (OUTPATIENT)
Dept: OUTPATIENT SERVICES | Facility: HOSPITAL | Age: 29
LOS: 1 days | Discharge: HOME | End: 2019-09-06

## 2019-09-06 VITALS
WEIGHT: 176.59 LBS | OXYGEN SATURATION: 100 % | TEMPERATURE: 99 F | SYSTOLIC BLOOD PRESSURE: 112 MMHG | HEART RATE: 85 BPM | RESPIRATION RATE: 18 BRPM | DIASTOLIC BLOOD PRESSURE: 75 MMHG | HEIGHT: 66 IN

## 2019-09-06 DIAGNOSIS — Z98.890 OTHER SPECIFIED POSTPROCEDURAL STATES: Chronic | ICD-10-CM

## 2019-09-06 DIAGNOSIS — D25.9 LEIOMYOMA OF UTERUS, UNSPECIFIED: ICD-10-CM

## 2019-09-06 DIAGNOSIS — Z01.818 ENCOUNTER FOR OTHER PREPROCEDURAL EXAMINATION: ICD-10-CM

## 2019-09-06 LAB
ALBUMIN SERPL ELPH-MCNC: 4.7 G/DL — SIGNIFICANT CHANGE UP (ref 3.5–5.2)
ALP SERPL-CCNC: 37 U/L — SIGNIFICANT CHANGE UP (ref 30–115)
ALT FLD-CCNC: 12 U/L — SIGNIFICANT CHANGE UP (ref 0–41)
ANION GAP SERPL CALC-SCNC: 12 MMOL/L — SIGNIFICANT CHANGE UP (ref 7–14)
APPEARANCE UR: CLEAR — SIGNIFICANT CHANGE UP
APTT BLD: 30.6 SEC — SIGNIFICANT CHANGE UP (ref 27–39.2)
AST SERPL-CCNC: 18 U/L — SIGNIFICANT CHANGE UP (ref 0–41)
BACTERIA # UR AUTO: NEGATIVE — SIGNIFICANT CHANGE UP
BASOPHILS # BLD AUTO: 0.04 K/UL — SIGNIFICANT CHANGE UP (ref 0–0.2)
BASOPHILS NFR BLD AUTO: 0.9 % — SIGNIFICANT CHANGE UP (ref 0–1)
BILIRUB SERPL-MCNC: 0.3 MG/DL — SIGNIFICANT CHANGE UP (ref 0.2–1.2)
BILIRUB UR-MCNC: NEGATIVE — SIGNIFICANT CHANGE UP
BUN SERPL-MCNC: 6 MG/DL — LOW (ref 10–20)
CALCIUM SERPL-MCNC: 9.5 MG/DL — SIGNIFICANT CHANGE UP (ref 8.5–10.1)
CHLORIDE SERPL-SCNC: 103 MMOL/L — SIGNIFICANT CHANGE UP (ref 98–110)
CO2 SERPL-SCNC: 26 MMOL/L — SIGNIFICANT CHANGE UP (ref 17–32)
COLOR SPEC: SIGNIFICANT CHANGE UP
CREAT SERPL-MCNC: 0.6 MG/DL — LOW (ref 0.7–1.5)
DIFF PNL FLD: ABNORMAL
EOSINOPHIL # BLD AUTO: 0.04 K/UL — SIGNIFICANT CHANGE UP (ref 0–0.7)
EOSINOPHIL NFR BLD AUTO: 0.9 % — SIGNIFICANT CHANGE UP (ref 0–8)
EPI CELLS # UR: 0 /HPF — SIGNIFICANT CHANGE UP (ref 0–5)
GLUCOSE SERPL-MCNC: 72 MG/DL — SIGNIFICANT CHANGE UP (ref 70–99)
GLUCOSE UR QL: NEGATIVE — SIGNIFICANT CHANGE UP
HCT VFR BLD CALC: 40.3 % — SIGNIFICANT CHANGE UP (ref 37–47)
HGB BLD-MCNC: 12.8 G/DL — SIGNIFICANT CHANGE UP (ref 12–16)
HYALINE CASTS # UR AUTO: 0 /LPF — SIGNIFICANT CHANGE UP (ref 0–7)
IMM GRANULOCYTES NFR BLD AUTO: 0 % — LOW (ref 0.1–0.3)
INR BLD: 1.07 RATIO — SIGNIFICANT CHANGE UP (ref 0.65–1.3)
KETONES UR-MCNC: NEGATIVE — SIGNIFICANT CHANGE UP
LEUKOCYTE ESTERASE UR-ACNC: NEGATIVE — SIGNIFICANT CHANGE UP
LYMPHOCYTES # BLD AUTO: 2.05 K/UL — SIGNIFICANT CHANGE UP (ref 1.2–3.4)
LYMPHOCYTES # BLD AUTO: 48.6 % — SIGNIFICANT CHANGE UP (ref 20.5–51.1)
MCHC RBC-ENTMCNC: 26.4 PG — LOW (ref 27–31)
MCHC RBC-ENTMCNC: 31.8 G/DL — LOW (ref 32–37)
MCV RBC AUTO: 83.1 FL — SIGNIFICANT CHANGE UP (ref 81–99)
MONOCYTES # BLD AUTO: 0.35 K/UL — SIGNIFICANT CHANGE UP (ref 0.1–0.6)
MONOCYTES NFR BLD AUTO: 8.3 % — SIGNIFICANT CHANGE UP (ref 1.7–9.3)
NEUTROPHILS # BLD AUTO: 1.74 K/UL — SIGNIFICANT CHANGE UP (ref 1.4–6.5)
NEUTROPHILS NFR BLD AUTO: 41.3 % — LOW (ref 42.2–75.2)
NITRITE UR-MCNC: NEGATIVE — SIGNIFICANT CHANGE UP
NRBC # BLD: 0 /100 WBCS — SIGNIFICANT CHANGE UP (ref 0–0)
PH UR: 7 — SIGNIFICANT CHANGE UP (ref 5–8)
PLATELET # BLD AUTO: 284 K/UL — SIGNIFICANT CHANGE UP (ref 130–400)
POTASSIUM SERPL-MCNC: 4.8 MMOL/L — SIGNIFICANT CHANGE UP (ref 3.5–5)
POTASSIUM SERPL-SCNC: 4.8 MMOL/L — SIGNIFICANT CHANGE UP (ref 3.5–5)
PROT SERPL-MCNC: 7.3 G/DL — SIGNIFICANT CHANGE UP (ref 6–8)
PROT UR-MCNC: NEGATIVE — SIGNIFICANT CHANGE UP
PROTHROM AB SERPL-ACNC: 12.3 SEC — SIGNIFICANT CHANGE UP (ref 9.95–12.87)
RBC # BLD: 4.85 M/UL — SIGNIFICANT CHANGE UP (ref 4.2–5.4)
RBC # FLD: 14.6 % — HIGH (ref 11.5–14.5)
RBC CASTS # UR COMP ASSIST: 7 /HPF — HIGH (ref 0–4)
SODIUM SERPL-SCNC: 141 MMOL/L — SIGNIFICANT CHANGE UP (ref 135–146)
SP GR SPEC: 1.01 — SIGNIFICANT CHANGE UP (ref 1.01–1.02)
UROBILINOGEN FLD QL: SIGNIFICANT CHANGE UP
WBC # BLD: 4.22 K/UL — LOW (ref 4.8–10.8)
WBC # FLD AUTO: 4.22 K/UL — LOW (ref 4.8–10.8)
WBC UR QL: 1 /HPF — SIGNIFICANT CHANGE UP (ref 0–5)

## 2019-09-06 NOTE — H&P PST ADULT - REASON FOR ADMISSION
patient presents for myomectomy for fibroid removal. pt denies current cp,sob,palp,cough,dysuria, fever.   can walk 2 fos and several blocks without sob.  pt states this is complete med/surg history.

## 2019-09-08 LAB
CULTURE RESULTS: SIGNIFICANT CHANGE UP
SPECIMEN SOURCE: SIGNIFICANT CHANGE UP

## 2019-09-17 ENCOUNTER — RESULT REVIEW (OUTPATIENT)
Age: 29
End: 2019-09-17

## 2019-09-17 ENCOUNTER — INPATIENT (INPATIENT)
Facility: HOSPITAL | Age: 29
LOS: 3 days | Discharge: HOME | End: 2019-09-21
Attending: OBSTETRICS & GYNECOLOGY | Admitting: OBSTETRICS & GYNECOLOGY
Payer: COMMERCIAL

## 2019-09-17 VITALS
HEIGHT: 66 IN | HEART RATE: 87 BPM | TEMPERATURE: 98 F | WEIGHT: 169.98 LBS | RESPIRATION RATE: 20 BRPM | SYSTOLIC BLOOD PRESSURE: 139 MMHG | DIASTOLIC BLOOD PRESSURE: 70 MMHG

## 2019-09-17 DIAGNOSIS — Z98.890 OTHER SPECIFIED POSTPROCEDURAL STATES: Chronic | ICD-10-CM

## 2019-09-17 LAB
ALBUMIN SERPL ELPH-MCNC: 3.6 G/DL — SIGNIFICANT CHANGE UP (ref 3.5–5.2)
ALP SERPL-CCNC: 31 U/L — SIGNIFICANT CHANGE UP (ref 30–115)
ALT FLD-CCNC: 10 U/L — SIGNIFICANT CHANGE UP (ref 0–41)
ANION GAP SERPL CALC-SCNC: 11 MMOL/L — SIGNIFICANT CHANGE UP (ref 7–14)
AST SERPL-CCNC: 28 U/L — SIGNIFICANT CHANGE UP (ref 0–41)
BASOPHILS # BLD AUTO: 0 K/UL — SIGNIFICANT CHANGE UP (ref 0–0.2)
BASOPHILS # BLD AUTO: 0.01 K/UL — SIGNIFICANT CHANGE UP (ref 0–0.2)
BASOPHILS NFR BLD AUTO: 0 % — SIGNIFICANT CHANGE UP (ref 0–1)
BASOPHILS NFR BLD AUTO: 0.1 % — SIGNIFICANT CHANGE UP (ref 0–1)
BILIRUB SERPL-MCNC: 0.3 MG/DL — SIGNIFICANT CHANGE UP (ref 0.2–1.2)
BUN SERPL-MCNC: 7 MG/DL — LOW (ref 10–20)
CALCIUM SERPL-MCNC: 8.1 MG/DL — LOW (ref 8.5–10.1)
CHLORIDE SERPL-SCNC: 104 MMOL/L — SIGNIFICANT CHANGE UP (ref 98–110)
CO2 SERPL-SCNC: 23 MMOL/L — SIGNIFICANT CHANGE UP (ref 17–32)
CREAT SERPL-MCNC: 0.5 MG/DL — LOW (ref 0.7–1.5)
EOSINOPHIL # BLD AUTO: 0 K/UL — SIGNIFICANT CHANGE UP (ref 0–0.7)
EOSINOPHIL # BLD AUTO: 0 K/UL — SIGNIFICANT CHANGE UP (ref 0–0.7)
EOSINOPHIL NFR BLD AUTO: 0 % — SIGNIFICANT CHANGE UP (ref 0–8)
EOSINOPHIL NFR BLD AUTO: 0 % — SIGNIFICANT CHANGE UP (ref 0–8)
GLUCOSE BLDC GLUCOMTR-MCNC: 93 MG/DL — SIGNIFICANT CHANGE UP (ref 70–99)
GLUCOSE SERPL-MCNC: 118 MG/DL — HIGH (ref 70–99)
HCT VFR BLD CALC: 23 % — LOW (ref 37–47)
HCT VFR BLD CALC: 24.3 % — LOW (ref 37–47)
HGB BLD-MCNC: 7.4 G/DL — LOW (ref 12–16)
HGB BLD-MCNC: 7.7 G/DL — LOW (ref 12–16)
IMM GRANULOCYTES NFR BLD AUTO: 0.3 % — SIGNIFICANT CHANGE UP (ref 0.1–0.3)
IMM GRANULOCYTES NFR BLD AUTO: 0.3 % — SIGNIFICANT CHANGE UP (ref 0.1–0.3)
LYMPHOCYTES # BLD AUTO: 1.11 K/UL — LOW (ref 1.2–3.4)
LYMPHOCYTES # BLD AUTO: 1.63 K/UL — SIGNIFICANT CHANGE UP (ref 1.2–3.4)
LYMPHOCYTES # BLD AUTO: 14.2 % — LOW (ref 20.5–51.1)
LYMPHOCYTES # BLD AUTO: 9.4 % — LOW (ref 20.5–51.1)
MAGNESIUM SERPL-MCNC: 1.6 MG/DL — LOW (ref 1.8–2.4)
MCHC RBC-ENTMCNC: 26.5 PG — LOW (ref 27–31)
MCHC RBC-ENTMCNC: 26.8 PG — LOW (ref 27–31)
MCHC RBC-ENTMCNC: 31.7 G/DL — LOW (ref 32–37)
MCHC RBC-ENTMCNC: 32.2 G/DL — SIGNIFICANT CHANGE UP (ref 32–37)
MCV RBC AUTO: 83.3 FL — SIGNIFICANT CHANGE UP (ref 81–99)
MCV RBC AUTO: 83.5 FL — SIGNIFICANT CHANGE UP (ref 81–99)
MONOCYTES # BLD AUTO: 1.06 K/UL — HIGH (ref 0.1–0.6)
MONOCYTES # BLD AUTO: 1.33 K/UL — HIGH (ref 0.1–0.6)
MONOCYTES NFR BLD AUTO: 11.6 % — HIGH (ref 1.7–9.3)
MONOCYTES NFR BLD AUTO: 9 % — SIGNIFICANT CHANGE UP (ref 1.7–9.3)
NEUTROPHILS # BLD AUTO: 8.5 K/UL — HIGH (ref 1.4–6.5)
NEUTROPHILS # BLD AUTO: 9.58 K/UL — HIGH (ref 1.4–6.5)
NEUTROPHILS NFR BLD AUTO: 73.8 % — SIGNIFICANT CHANGE UP (ref 42.2–75.2)
NEUTROPHILS NFR BLD AUTO: 81.3 % — HIGH (ref 42.2–75.2)
NRBC # BLD: 0 /100 WBCS — SIGNIFICANT CHANGE UP (ref 0–0)
NRBC # BLD: 0 /100 WBCS — SIGNIFICANT CHANGE UP (ref 0–0)
PHOSPHATE SERPL-MCNC: 3.9 MG/DL — SIGNIFICANT CHANGE UP (ref 2.1–4.9)
PLATELET # BLD AUTO: 231 K/UL — SIGNIFICANT CHANGE UP (ref 130–400)
PLATELET # BLD AUTO: 237 K/UL — SIGNIFICANT CHANGE UP (ref 130–400)
POTASSIUM SERPL-MCNC: 4.3 MMOL/L — SIGNIFICANT CHANGE UP (ref 3.5–5)
POTASSIUM SERPL-SCNC: 4.3 MMOL/L — SIGNIFICANT CHANGE UP (ref 3.5–5)
PROT SERPL-MCNC: 5.3 G/DL — LOW (ref 6–8)
RBC # BLD: 2.76 M/UL — LOW (ref 4.2–5.4)
RBC # BLD: 2.91 M/UL — LOW (ref 4.2–5.4)
RBC # FLD: 13.4 % — SIGNIFICANT CHANGE UP (ref 11.5–14.5)
RBC # FLD: 13.6 % — SIGNIFICANT CHANGE UP (ref 11.5–14.5)
SODIUM SERPL-SCNC: 138 MMOL/L — SIGNIFICANT CHANGE UP (ref 135–146)
WBC # BLD: 11.51 K/UL — HIGH (ref 4.8–10.8)
WBC # BLD: 11.79 K/UL — HIGH (ref 4.8–10.8)
WBC # FLD AUTO: 11.51 K/UL — HIGH (ref 4.8–10.8)
WBC # FLD AUTO: 11.79 K/UL — HIGH (ref 4.8–10.8)

## 2019-09-17 PROCEDURE — 88305 TISSUE EXAM BY PATHOLOGIST: CPT | Mod: 26

## 2019-09-17 RX ORDER — ONDANSETRON 8 MG/1
4 TABLET, FILM COATED ORAL EVERY 4 HOURS
Refills: 0 | Status: DISCONTINUED | OUTPATIENT
Start: 2019-09-17 | End: 2019-09-17

## 2019-09-17 RX ORDER — OXYCODONE AND ACETAMINOPHEN 5; 325 MG/1; MG/1
1 TABLET ORAL ONCE
Refills: 0 | Status: DISCONTINUED | OUTPATIENT
Start: 2019-09-17 | End: 2019-09-17

## 2019-09-17 RX ORDER — ACETAMINOPHEN 500 MG
1000 TABLET ORAL ONCE
Refills: 0 | Status: COMPLETED | OUTPATIENT
Start: 2019-09-18 | End: 2019-09-18

## 2019-09-17 RX ORDER — ACETAMINOPHEN 500 MG
1000 TABLET ORAL ONCE
Refills: 0 | Status: COMPLETED | OUTPATIENT
Start: 2019-09-17 | End: 2019-09-17

## 2019-09-17 RX ORDER — IBUPROFEN 200 MG
600 TABLET ORAL EVERY 6 HOURS
Refills: 0 | Status: DISCONTINUED | OUTPATIENT
Start: 2019-09-17 | End: 2019-09-21

## 2019-09-17 RX ORDER — SODIUM CHLORIDE 9 MG/ML
1000 INJECTION, SOLUTION INTRAVENOUS
Refills: 0 | Status: DISCONTINUED | OUTPATIENT
Start: 2019-09-17 | End: 2019-09-20

## 2019-09-17 RX ORDER — SODIUM CHLORIDE 9 MG/ML
1000 INJECTION, SOLUTION INTRAVENOUS
Refills: 0 | Status: DISCONTINUED | OUTPATIENT
Start: 2019-09-17 | End: 2019-09-17

## 2019-09-17 RX ORDER — OXYCODONE HYDROCHLORIDE 5 MG/1
5 TABLET ORAL EVERY 6 HOURS
Refills: 0 | Status: DISCONTINUED | OUTPATIENT
Start: 2019-09-17 | End: 2019-09-21

## 2019-09-17 RX ORDER — INFLUENZA VIRUS VACCINE 15; 15; 15; 15 UG/.5ML; UG/.5ML; UG/.5ML; UG/.5ML
0.5 SUSPENSION INTRAMUSCULAR ONCE
Refills: 0 | Status: COMPLETED | OUTPATIENT
Start: 2019-09-17 | End: 2019-09-17

## 2019-09-17 RX ORDER — DOCUSATE SODIUM 100 MG
100 CAPSULE ORAL
Refills: 0 | Status: DISCONTINUED | OUTPATIENT
Start: 2019-09-17 | End: 2019-09-21

## 2019-09-17 RX ORDER — ACETAMINOPHEN 500 MG
650 TABLET ORAL ONCE
Refills: 0 | Status: DISCONTINUED | OUTPATIENT
Start: 2019-09-17 | End: 2019-09-17

## 2019-09-17 RX ORDER — SIMETHICONE 80 MG/1
80 TABLET, CHEWABLE ORAL EVERY 4 HOURS
Refills: 0 | Status: DISCONTINUED | OUTPATIENT
Start: 2019-09-17 | End: 2019-09-21

## 2019-09-17 RX ORDER — GABAPENTIN 400 MG/1
300 CAPSULE ORAL EVERY 6 HOURS
Refills: 0 | Status: DISCONTINUED | OUTPATIENT
Start: 2019-09-18 | End: 2019-09-21

## 2019-09-17 RX ORDER — MORPHINE SULFATE 50 MG/1
4 CAPSULE, EXTENDED RELEASE ORAL
Refills: 0 | Status: DISCONTINUED | OUTPATIENT
Start: 2019-09-17 | End: 2019-09-17

## 2019-09-17 RX ORDER — FERROUS SULFATE 325(65) MG
325 TABLET ORAL
Refills: 0 | Status: DISCONTINUED | OUTPATIENT
Start: 2019-09-17 | End: 2019-09-21

## 2019-09-17 RX ADMIN — SODIUM CHLORIDE 100 MILLILITER(S): 9 INJECTION, SOLUTION INTRAVENOUS at 12:22

## 2019-09-17 RX ADMIN — Medication 600 MILLIGRAM(S): at 12:50

## 2019-09-17 RX ADMIN — SODIUM CHLORIDE 125 MILLILITER(S): 9 INJECTION, SOLUTION INTRAVENOUS at 12:23

## 2019-09-17 RX ADMIN — MORPHINE SULFATE 4 MILLIGRAM(S): 50 CAPSULE, EXTENDED RELEASE ORAL at 12:25

## 2019-09-17 RX ADMIN — Medication 1000 MILLIGRAM(S): at 18:09

## 2019-09-17 RX ADMIN — Medication 100 MILLIGRAM(S): at 18:07

## 2019-09-17 RX ADMIN — SIMETHICONE 80 MILLIGRAM(S): 80 TABLET, CHEWABLE ORAL at 18:07

## 2019-09-17 RX ADMIN — Medication 400 MILLIGRAM(S): at 12:19

## 2019-09-17 RX ADMIN — GABAPENTIN 300 MILLIGRAM(S): 400 CAPSULE ORAL at 23:45

## 2019-09-17 RX ADMIN — Medication 1000 MILLIGRAM(S): at 12:50

## 2019-09-17 RX ADMIN — MORPHINE SULFATE 4 MILLIGRAM(S): 50 CAPSULE, EXTENDED RELEASE ORAL at 12:35

## 2019-09-17 RX ADMIN — OXYCODONE AND ACETAMINOPHEN 1 TABLET(S): 5; 325 TABLET ORAL at 14:47

## 2019-09-17 RX ADMIN — Medication 325 MILLIGRAM(S): at 18:07

## 2019-09-17 RX ADMIN — Medication 400 MILLIGRAM(S): at 18:09

## 2019-09-17 RX ADMIN — Medication 600 MILLIGRAM(S): at 18:09

## 2019-09-17 RX ADMIN — SIMETHICONE 80 MILLIGRAM(S): 80 TABLET, CHEWABLE ORAL at 14:40

## 2019-09-17 RX ADMIN — Medication 600 MILLIGRAM(S): at 23:46

## 2019-09-17 RX ADMIN — SODIUM CHLORIDE 125 MILLILITER(S): 9 INJECTION, SOLUTION INTRAVENOUS at 20:29

## 2019-09-17 RX ADMIN — SIMETHICONE 80 MILLIGRAM(S): 80 TABLET, CHEWABLE ORAL at 22:17

## 2019-09-17 RX ADMIN — Medication 600 MILLIGRAM(S): at 12:19

## 2019-09-17 RX ADMIN — MORPHINE SULFATE 4 MILLIGRAM(S): 50 CAPSULE, EXTENDED RELEASE ORAL at 12:50

## 2019-09-17 RX ADMIN — MORPHINE SULFATE 4 MILLIGRAM(S): 50 CAPSULE, EXTENDED RELEASE ORAL at 11:55

## 2019-09-17 RX ADMIN — MORPHINE SULFATE 4 MILLIGRAM(S): 50 CAPSULE, EXTENDED RELEASE ORAL at 12:10

## 2019-09-17 NOTE — ASU PATIENT PROFILE, ADULT - URINARY CATHETER
24yo  PPD5 from  c/b presumed spinal HA status post blood patch on  and discharge. On  PM HA restarted and worsened. Denies fevers, chills, nausea and voimiting. Gilbert urinary and fecal incontinence. Endorses shoulder pain with movement and nuchal pain with neck flexion. Deneis weakness and slurring or speech. HA is bi frontal and occipital and 10/10 with stading but still painful while lying down. Patient also experiencing photophobia. No RUQ, epigastric or visual changes.
no

## 2019-09-17 NOTE — BRIEF OPERATIVE NOTE - OPERATION/FINDINGS
Multiple fibroids (greater than 10) removed between 1 and 6cm sized. Largest removed were posterior x2 (4cm and 3cm), fundal x3 (6cm, 3cm, 1cm), anterior x3 (4cm, 3cm, 2cm) and bilaterally on the insertion site of the fallopian tubes, distorting the tubal anatomy 2cm on right and 2cm on left, 3 other 1-3cm fibroids removed that were subserosal in nature along the anterior side of the uterus. Endometrial cavity was entered, patient SHOULD NOT BE LABORED, due to the extent of the myomectomy and the endometrial cavity being entered, it is recommended that the patient SHOULD HAVE  DELIVERY.

## 2019-09-17 NOTE — CHART NOTE - NSCHARTNOTEFT_GEN_A_CORE
PACU ANESTHESIA ADMISSION NOTE      Procedure: Abdominal myomectomy: via pfannensteil incision, uterine cavity entered    Post op diagnosis:  Fibroid uterus      ____  Intubated  TV:______       Rate: ______      FiO2: ______    _x___  Patent Airway    _x___  Full return of protective reflexes    _x___  Full recovery from anesthesia / back to baseline status    Vitals:            T:    100.7            BP :  120/67              R:  15            Sat:  100             P: 93      Mental Status:  _x___ Awake   _____ Alert   _____ Drowsy   _____ Sedated    Nausea/Vomiting:  _x___  NO       ______Yes,   See Post - Op Orders         Pain Scale (0-10):  __0___    Treatment: ____ None    __x__ See Post - Op/PCA Orders  Post - Operative Fluids:   ___ Oral   ___x_ See Post - Op Orders    Plan: Discharge:   ____Home       __x___Floor     _____Critical Care    _____  Other:_________________    Comments:  No anesthesia issues or complications noted.  Discharge when criteria met.

## 2019-09-17 NOTE — PROGRESS NOTE ADULT - ASSESSMENT
27yo P0 with menorrhagia and fibroid uterus s/p abdominal myomectomy, EBL 600cc, POD0, doing well      -OOB to chair, ambulate as tolerated  -Incentive spirometry encouraged  -f/u 1600 labs  -f/u AM labs  -f/u UO output, nathan in until AM  -pain management PRN  -Regular diet  -vitals q routine    Patient is a [employer], she will not accept blood products or her own blood.    Dr. Daniels and Dr. Angela to be made aware

## 2019-09-17 NOTE — PROGRESS NOTE ADULT - SUBJECTIVE AND OBJECTIVE BOX
PGY2 Note- Postop day0    Pt seen and examined at bedside. No complaints. Pain is well controlled. She is tolerating fluids, has not attempted solids yet. Has not passed flatus. Denies N/V, fevers, chills, chest pain, dizziness, palpitations, extremity pain or swelling.       PHYSICAL EXAM:   Vital Signs Last 24 Hrs  T(F): 97.4 (17 Sep 2019 15:58), Max: 100.7 (17 Sep 2019 11:42)  HR: 86 (17 Sep 2019 15:58) (86 - 106)  BP: 120/74 (17 Sep 2019 15:58) (110/76 - 139/70)  RR: 18 (17 Sep 2019 15:58) (16 - 20)  SpO2: 99% (17 Sep 2019 14:40) (98% - 100%)    General Appearance: NAD, AA0x3  Cardiac: RRR  Pulmonary: CTAB  Abdomen: soft, appropriately tender, nondistended, no rebound, guarding, or rigidity. Pos BS.  Incision: clean, dry and intact; surgical dressing in place  nathan in place. UO 200cc [id number] (adequate)  Extremities: no swelling or calf tenderness. SCD's in place      MEDICATIONS  (STANDING):  acetaminophen  IVPB .. 1000 milliGRAM(s) IV Intermittent once  docusate sodium 100 milliGRAM(s) Oral two times a day  ferrous    sulfate 325 milliGRAM(s) Oral two times a day  ibuprofen  Tablet. 600 milliGRAM(s) Oral every 6 hours  influenza   Vaccine 0.5 milliLiter(s) IntraMuscular once  lactated ringers. 1000 milliLiter(s) (125 mL/Hr) IV Continuous <Continuous>  simethicone 80 milliGRAM(s) Chew every 4 hours    MEDICATIONS  (PRN):  oxyCODONE    IR 5 milliGRAM(s) Oral every 6 hours PRN Moderate Pain (4 - 6)

## 2019-09-17 NOTE — BRIEF OPERATIVE NOTE - NSICDXBRIEFPROCEDURE_GEN_ALL_CORE_FT
PROCEDURES:  Abdominal myomectomy 17-Sep-2019 11:35:00 via pfannensteil incision, uterine cavity entered Pieter Alvarez

## 2019-09-17 NOTE — PRE-ANESTHESIA EVALUATION ADULT - NSANTHOSAYNRD_GEN_A_CORE
see tool/No. RODDY screening performed.  STOP BANG Legend: 0-2 = LOW Risk; 3-4 = INTERMEDIATE Risk; 5-8 = HIGH Risk

## 2019-09-18 LAB
ALBUMIN SERPL ELPH-MCNC: 3.3 G/DL — LOW (ref 3.5–5.2)
ALP SERPL-CCNC: 32 U/L — SIGNIFICANT CHANGE UP (ref 30–115)
ALT FLD-CCNC: 8 U/L — SIGNIFICANT CHANGE UP (ref 0–41)
ANION GAP SERPL CALC-SCNC: 9 MMOL/L — SIGNIFICANT CHANGE UP (ref 7–14)
APTT BLD: 28 SEC — SIGNIFICANT CHANGE UP (ref 27–39.2)
AST SERPL-CCNC: 23 U/L — SIGNIFICANT CHANGE UP (ref 0–41)
BASOPHILS # BLD AUTO: 0.01 K/UL — SIGNIFICANT CHANGE UP (ref 0–0.2)
BASOPHILS # BLD AUTO: 0.03 K/UL — SIGNIFICANT CHANGE UP (ref 0–0.2)
BASOPHILS # BLD AUTO: 0.03 K/UL — SIGNIFICANT CHANGE UP (ref 0–0.2)
BASOPHILS NFR BLD AUTO: 0.1 % — SIGNIFICANT CHANGE UP (ref 0–1)
BASOPHILS NFR BLD AUTO: 0.3 % — SIGNIFICANT CHANGE UP (ref 0–1)
BASOPHILS NFR BLD AUTO: 0.4 % — SIGNIFICANT CHANGE UP (ref 0–1)
BILIRUB SERPL-MCNC: 0.3 MG/DL — SIGNIFICANT CHANGE UP (ref 0.2–1.2)
BLD GP AB SCN SERPL QL: SIGNIFICANT CHANGE UP
BUN SERPL-MCNC: 8 MG/DL — LOW (ref 10–20)
CALCIUM SERPL-MCNC: 8.2 MG/DL — LOW (ref 8.5–10.1)
CHLORIDE SERPL-SCNC: 106 MMOL/L — SIGNIFICANT CHANGE UP (ref 98–110)
CO2 SERPL-SCNC: 27 MMOL/L — SIGNIFICANT CHANGE UP (ref 17–32)
CREAT SERPL-MCNC: 0.6 MG/DL — LOW (ref 0.7–1.5)
EOSINOPHIL # BLD AUTO: 0.02 K/UL — SIGNIFICANT CHANGE UP (ref 0–0.7)
EOSINOPHIL # BLD AUTO: 0.03 K/UL — SIGNIFICANT CHANGE UP (ref 0–0.7)
EOSINOPHIL # BLD AUTO: 0.03 K/UL — SIGNIFICANT CHANGE UP (ref 0–0.7)
EOSINOPHIL NFR BLD AUTO: 0.2 % — SIGNIFICANT CHANGE UP (ref 0–8)
EOSINOPHIL NFR BLD AUTO: 0.3 % — SIGNIFICANT CHANGE UP (ref 0–8)
EOSINOPHIL NFR BLD AUTO: 0.4 % — SIGNIFICANT CHANGE UP (ref 0–8)
FIBRINOGEN PPP-MCNC: 540 MG/DL — SIGNIFICANT CHANGE UP (ref 204.4–570.6)
GLUCOSE SERPL-MCNC: 102 MG/DL — HIGH (ref 70–99)
HCT VFR BLD CALC: 21.9 % — LOW (ref 37–47)
HCT VFR BLD CALC: 22.8 % — LOW (ref 37–47)
HCT VFR BLD CALC: 24.3 % — LOW (ref 37–47)
HGB BLD-MCNC: 7 G/DL — LOW (ref 12–16)
HGB BLD-MCNC: 7.2 G/DL — LOW (ref 12–16)
HGB BLD-MCNC: 7.8 G/DL — LOW (ref 12–16)
IMM GRANULOCYTES NFR BLD AUTO: 0.2 % — SIGNIFICANT CHANGE UP (ref 0.1–0.3)
IMM GRANULOCYTES NFR BLD AUTO: 0.3 % — SIGNIFICANT CHANGE UP (ref 0.1–0.3)
IMM GRANULOCYTES NFR BLD AUTO: 0.5 % — HIGH (ref 0.1–0.3)
INR BLD: 1.2 RATIO — SIGNIFICANT CHANGE UP (ref 0.65–1.3)
IRON SATN MFR SERPL: 14 % — LOW (ref 15–50)
IRON SATN MFR SERPL: 36 UG/DL — SIGNIFICANT CHANGE UP (ref 35–150)
LYMPHOCYTES # BLD AUTO: 1.77 K/UL — SIGNIFICANT CHANGE UP (ref 1.2–3.4)
LYMPHOCYTES # BLD AUTO: 1.88 K/UL — SIGNIFICANT CHANGE UP (ref 1.2–3.4)
LYMPHOCYTES # BLD AUTO: 2.13 K/UL — SIGNIFICANT CHANGE UP (ref 1.2–3.4)
LYMPHOCYTES # BLD AUTO: 20.7 % — SIGNIFICANT CHANGE UP (ref 20.5–51.1)
LYMPHOCYTES # BLD AUTO: 23.2 % — SIGNIFICANT CHANGE UP (ref 20.5–51.1)
LYMPHOCYTES # BLD AUTO: 23.9 % — SIGNIFICANT CHANGE UP (ref 20.5–51.1)
MAGNESIUM SERPL-MCNC: 2.5 MG/DL — HIGH (ref 1.8–2.4)
MCHC RBC-ENTMCNC: 26.7 PG — LOW (ref 27–31)
MCHC RBC-ENTMCNC: 26.7 PG — LOW (ref 27–31)
MCHC RBC-ENTMCNC: 27 PG — SIGNIFICANT CHANGE UP (ref 27–31)
MCHC RBC-ENTMCNC: 31.6 G/DL — LOW (ref 32–37)
MCHC RBC-ENTMCNC: 32 G/DL — SIGNIFICANT CHANGE UP (ref 32–37)
MCHC RBC-ENTMCNC: 32.1 G/DL — SIGNIFICANT CHANGE UP (ref 32–37)
MCV RBC AUTO: 83.6 FL — SIGNIFICANT CHANGE UP (ref 81–99)
MCV RBC AUTO: 84.1 FL — SIGNIFICANT CHANGE UP (ref 81–99)
MCV RBC AUTO: 84.4 FL — SIGNIFICANT CHANGE UP (ref 81–99)
MONOCYTES # BLD AUTO: 0.61 K/UL — HIGH (ref 0.1–0.6)
MONOCYTES # BLD AUTO: 0.8 K/UL — HIGH (ref 0.1–0.6)
MONOCYTES # BLD AUTO: 0.97 K/UL — HIGH (ref 0.1–0.6)
MONOCYTES NFR BLD AUTO: 11.4 % — HIGH (ref 1.7–9.3)
MONOCYTES NFR BLD AUTO: 7.5 % — SIGNIFICANT CHANGE UP (ref 1.7–9.3)
MONOCYTES NFR BLD AUTO: 9 % — SIGNIFICANT CHANGE UP (ref 1.7–9.3)
NEUTROPHILS # BLD AUTO: 5.52 K/UL — SIGNIFICANT CHANGE UP (ref 1.4–6.5)
NEUTROPHILS # BLD AUTO: 5.73 K/UL — SIGNIFICANT CHANGE UP (ref 1.4–6.5)
NEUTROPHILS # BLD AUTO: 5.91 K/UL — SIGNIFICANT CHANGE UP (ref 1.4–6.5)
NEUTROPHILS NFR BLD AUTO: 66.2 % — SIGNIFICANT CHANGE UP (ref 42.2–75.2)
NEUTROPHILS NFR BLD AUTO: 67.1 % — SIGNIFICANT CHANGE UP (ref 42.2–75.2)
NEUTROPHILS NFR BLD AUTO: 68.3 % — SIGNIFICANT CHANGE UP (ref 42.2–75.2)
NRBC # BLD: 0 /100 WBCS — SIGNIFICANT CHANGE UP (ref 0–0)
PHOSPHATE SERPL-MCNC: 3.1 MG/DL — SIGNIFICANT CHANGE UP (ref 2.1–4.9)
PLATELET # BLD AUTO: 219 K/UL — SIGNIFICANT CHANGE UP (ref 130–400)
PLATELET # BLD AUTO: 234 K/UL — SIGNIFICANT CHANGE UP (ref 130–400)
PLATELET # BLD AUTO: 236 K/UL — SIGNIFICANT CHANGE UP (ref 130–400)
POTASSIUM SERPL-MCNC: 4.6 MMOL/L — SIGNIFICANT CHANGE UP (ref 3.5–5)
POTASSIUM SERPL-SCNC: 4.6 MMOL/L — SIGNIFICANT CHANGE UP (ref 3.5–5)
PROT SERPL-MCNC: 4.9 G/DL — LOW (ref 6–8)
PROTHROM AB SERPL-ACNC: 13.8 SEC — HIGH (ref 9.95–12.87)
RBC # BLD: 2.62 M/UL — LOW (ref 4.2–5.4)
RBC # BLD: 2.7 M/UL — LOW (ref 4.2–5.4)
RBC # BLD: 2.7 M/UL — LOW (ref 4.2–5.4)
RBC # BLD: 2.89 M/UL — LOW (ref 4.2–5.4)
RBC # FLD: 13.5 % — SIGNIFICANT CHANGE UP (ref 11.5–14.5)
RBC # FLD: 13.6 % — SIGNIFICANT CHANGE UP (ref 11.5–14.5)
RBC # FLD: 13.6 % — SIGNIFICANT CHANGE UP (ref 11.5–14.5)
RETICS #: 128.8 K/UL — HIGH (ref 25–125)
RETICS/RBC NFR: 4.8 % — HIGH (ref 0.5–1.5)
SODIUM SERPL-SCNC: 142 MMOL/L — SIGNIFICANT CHANGE UP (ref 135–146)
TIBC SERPL-MCNC: 262 UG/DL — SIGNIFICANT CHANGE UP (ref 220–430)
TRANSFERRIN SERPL-MCNC: 233 MG/DL — SIGNIFICANT CHANGE UP (ref 200–360)
UIBC SERPL-MCNC: 226 UG/DL — SIGNIFICANT CHANGE UP (ref 110–370)
WBC # BLD: 8.09 K/UL — SIGNIFICANT CHANGE UP (ref 4.8–10.8)
WBC # BLD: 8.54 K/UL — SIGNIFICANT CHANGE UP (ref 4.8–10.8)
WBC # BLD: 8.93 K/UL — SIGNIFICANT CHANGE UP (ref 4.8–10.8)
WBC # FLD AUTO: 8.09 K/UL — SIGNIFICANT CHANGE UP (ref 4.8–10.8)
WBC # FLD AUTO: 8.54 K/UL — SIGNIFICANT CHANGE UP (ref 4.8–10.8)
WBC # FLD AUTO: 8.93 K/UL — SIGNIFICANT CHANGE UP (ref 4.8–10.8)

## 2019-09-18 PROCEDURE — 99254 IP/OBS CNSLTJ NEW/EST MOD 60: CPT

## 2019-09-18 RX ORDER — ASCORBIC ACID 60 MG
500 TABLET,CHEWABLE ORAL DAILY
Refills: 0 | Status: DISCONTINUED | OUTPATIENT
Start: 2019-09-18 | End: 2019-09-21

## 2019-09-18 RX ORDER — MAGNESIUM SULFATE 500 MG/ML
2 VIAL (ML) INJECTION ONCE
Refills: 0 | Status: COMPLETED | OUTPATIENT
Start: 2019-09-18 | End: 2019-09-18

## 2019-09-18 RX ORDER — FOLIC ACID 0.8 MG
1 TABLET ORAL DAILY
Refills: 0 | Status: DISCONTINUED | OUTPATIENT
Start: 2019-09-18 | End: 2019-09-21

## 2019-09-18 RX ORDER — NORETHINDRONE 0.35 MG/1
5 TABLET ORAL
Refills: 0 | Status: DISCONTINUED | OUTPATIENT
Start: 2019-09-18 | End: 2019-09-21

## 2019-09-18 RX ADMIN — Medication 0.2 MILLIGRAM(S): at 10:56

## 2019-09-18 RX ADMIN — GABAPENTIN 300 MILLIGRAM(S): 400 CAPSULE ORAL at 17:31

## 2019-09-18 RX ADMIN — Medication 50 GRAM(S): at 01:09

## 2019-09-18 RX ADMIN — Medication 325 MILLIGRAM(S): at 17:32

## 2019-09-18 RX ADMIN — NORETHINDRONE 5 MILLIGRAM(S): 0.35 TABLET ORAL at 12:22

## 2019-09-18 RX ADMIN — SIMETHICONE 80 MILLIGRAM(S): 80 TABLET, CHEWABLE ORAL at 17:33

## 2019-09-18 RX ADMIN — SIMETHICONE 80 MILLIGRAM(S): 80 TABLET, CHEWABLE ORAL at 12:58

## 2019-09-18 RX ADMIN — Medication 400 MILLIGRAM(S): at 00:21

## 2019-09-18 RX ADMIN — SIMETHICONE 80 MILLIGRAM(S): 80 TABLET, CHEWABLE ORAL at 10:56

## 2019-09-18 RX ADMIN — Medication 600 MILLIGRAM(S): at 23:28

## 2019-09-18 RX ADMIN — Medication 400 MILLIGRAM(S): at 06:48

## 2019-09-18 RX ADMIN — SODIUM CHLORIDE 125 MILLILITER(S): 9 INJECTION, SOLUTION INTRAVENOUS at 23:27

## 2019-09-18 RX ADMIN — Medication 600 MILLIGRAM(S): at 05:29

## 2019-09-18 RX ADMIN — Medication 0.2 MILLIGRAM(S): at 17:31

## 2019-09-18 RX ADMIN — Medication 100 MILLIGRAM(S): at 17:31

## 2019-09-18 RX ADMIN — Medication 600 MILLIGRAM(S): at 13:13

## 2019-09-18 RX ADMIN — Medication 325 MILLIGRAM(S): at 05:29

## 2019-09-18 RX ADMIN — Medication 1000 MILLIGRAM(S): at 01:30

## 2019-09-18 RX ADMIN — Medication 600 MILLIGRAM(S): at 17:32

## 2019-09-18 RX ADMIN — GABAPENTIN 300 MILLIGRAM(S): 400 CAPSULE ORAL at 05:29

## 2019-09-18 RX ADMIN — GABAPENTIN 300 MILLIGRAM(S): 400 CAPSULE ORAL at 12:57

## 2019-09-18 RX ADMIN — Medication 600 MILLIGRAM(S): at 01:00

## 2019-09-18 RX ADMIN — Medication 600 MILLIGRAM(S): at 12:57

## 2019-09-18 RX ADMIN — Medication 100 MILLIGRAM(S): at 05:29

## 2019-09-18 RX ADMIN — Medication 500 MILLIGRAM(S): at 17:32

## 2019-09-18 RX ADMIN — Medication 0.2 MILLIGRAM(S): at 23:28

## 2019-09-18 RX ADMIN — SODIUM CHLORIDE 125 MILLILITER(S): 9 INJECTION, SOLUTION INTRAVENOUS at 05:28

## 2019-09-18 RX ADMIN — SIMETHICONE 80 MILLIGRAM(S): 80 TABLET, CHEWABLE ORAL at 23:28

## 2019-09-18 RX ADMIN — GABAPENTIN 300 MILLIGRAM(S): 400 CAPSULE ORAL at 23:28

## 2019-09-18 RX ADMIN — Medication 1 MILLIGRAM(S): at 17:31

## 2019-09-18 RX ADMIN — SIMETHICONE 80 MILLIGRAM(S): 80 TABLET, CHEWABLE ORAL at 05:29

## 2019-09-18 NOTE — PROGRESS NOTE ADULT - SUBJECTIVE AND OBJECTIVE BOX
PGY2 Note    Pt seen and examined at bedside. No overnight events, no new complaints. Recovering well, tolerating regular diet, got OOB to chair without difficulty. Denies dizziness, lightheadedness, headache, palpitations, chest pain, SOB, N/V, fevers, chills, extremity pain or swelling, excessive bleeding. has not passed flatus or had a bowel movement yet.      PHYSICAL EXAM:  Vital Signs Last 24 Hrs  T(F): 97.9 (18 Sep 2019 03:00), Max: 100.7 (17 Sep 2019 11:42)  HR: 98 (18 Sep 2019 03:00) (86 - 106)  BP: 108/59 (18 Sep 2019 03:00) (108/59 - 139/70)  RR: 18 (18 Sep 2019 03:00) (16 - 20)  SpO2: 99% (17 Sep 2019 14:40) (98% - 100%)    General Appearance: NAD, AA0x3  Cardiac: RRR  Pulmonary: CTAB  Abdomen: Positive bowel souds. Soft, nontender, nondistended, no rebound, guarding, or rigidity.   Incision: Surgical dressing removed.clean, dry and intact; steri-strips in place.   Extremities: no swelling or calf tenderness or erythema  perineal: small amount of bleeding vaginally    LABS:                                   7.4    11.51 )-----------( 231      ( 17 Sep 2019 23:30 )             23.0                         7.7    11.79 )-----------( 237      ( 17 Sep 2019 20:05 )             24.3   09-17 @ 20:05    138  |  104  |  7   ----------------------------<  118  4.3   |  23  |  0.5      Phos  3.9     09-17 @ 20:05  Mg     1.6     09-17 @ 20:05    TPro  5.3  /  Alb  3.6  /  TBili  0.3  /  DBili  x   /  AST  28  /  ALT  10  /  AlkPhos  31  09-17 @ 20:05      MEDICATIONS  (STANDING):  docusate sodium 100 milliGRAM(s) Oral two times a day  ferrous    sulfate 325 milliGRAM(s) Oral two times a day  gabapentin 300 milliGRAM(s) Oral every 6 hours  ibuprofen  Tablet. 600 milliGRAM(s) Oral every 6 hours  influenza   Vaccine 0.5 milliLiter(s) IntraMuscular once  lactated ringers. 1000 milliLiter(s) (125 mL/Hr) IV Continuous <Continuous>  simethicone 80 milliGRAM(s) Chew every 4 hours    MEDICATIONS  (PRN):  oxyCODONE    IR 5 milliGRAM(s) Oral every 6 hours PRN Moderate Pain (4 - 6)          RADIOLOGY AND ADDITIONAL TESTS:

## 2019-09-18 NOTE — CONSULT NOTE ADULT - ASSESSMENT
Patient is a 29 yo female P0 who underwent elective abdominal myomectomy for uterine fibroids causing anemia.  On pre-op, her HGB/H was 12.8/40.3, normocytic.  Post-op, H/H is 7.7/24.3, MCV 83.  Patient is a [employer] and refuses blood products.    She has been given Ferrous sulfate 325mg BID po along w/ folic acid    Plan:  -Monitor H/H  -Iron studies  -      Note not final w/o attending attestation Patient is a 27 yo female P0 who underwent elective abdominal myomectomy for uterine fibroids causing anemia.  On pre-op, her HGB/H was 12.8/40.3, normocytic.  Post-op, H/H is 7.7/24.3, MCV 83.  Patient is a [employer] and refuses blood products.  H/H levels are likely d/t operative bleeding.  She has been given Ferrous sulfate 325mg BID po along w/ folic acid  Discussed with patient that if ferritin is low, will consider IV iron  Patient denies heavy bleeding and has no symptoms of anemia,    Plan:  -Monitor H/H  -Please get Iron studies, ferritin, TIBC, % iron, Transferrin  -Will follow up w/ lab results.  -Continue 325 ferrous sulfate BID PO; will consider IV after ferritin result  -Monitor for symptoms and bleeding    Note not final w/o attending attestation Patient is a 29 yo female P0 who underwent elective abdominal myomectomy for uterine fibroids causing anemia.  On pre-op, her HGB/H was 12.8/40.3, normocytic.  Post-op, H/H is 7.7/24.3, MCV 83.  Patient is a [employer] and refuses blood products.  H/H levels are likely d/t operative bleeding.  She has been given Ferrous sulfate 325mg BID po along w/ folic acid  Discussed with patient that if ferritin is low, will consider IV iron  Patient denies heavy bleeding and has no symptoms of anemia,    Plan:  -Monitor H/H  -Please get Iron studies, ferritin, TIBC, % iron, Transferrin  -Will follow up w/ lab results.  -Continue 325 ferrous sulfate BID PO; will consider IV after ferritin result  -Monitor for symptoms and bleeding

## 2019-09-18 NOTE — PROGRESS NOTE ADULT - ASSESSMENT
29yo P0 with menorrhagia and fibroid uterus s/p abdominal myomectomy, EBL 600cc, POD1, with acute anemia likely secondary to perioperative blood loss, asymptomatic, doing well    -ambulate as tolerated  -Incentive spirometry encouraged  -f/u AM labs  -TOV 1300  -pain management PRN  -Regular diet  -vitals q routine  -continue with iron and Aygestin    Patient is a [employer]ast[employer]ism, she will not accept blood products or her own blood.    Dr. Daniels and Dr. Angela to be made aware
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
## 2019-09-19 LAB
BASOPHILS # BLD AUTO: 0.04 K/UL — SIGNIFICANT CHANGE UP (ref 0–0.2)
BASOPHILS # BLD AUTO: 0.05 K/UL — SIGNIFICANT CHANGE UP (ref 0–0.2)
BASOPHILS NFR BLD AUTO: 0.5 % — SIGNIFICANT CHANGE UP (ref 0–1)
BASOPHILS NFR BLD AUTO: 0.6 % — SIGNIFICANT CHANGE UP (ref 0–1)
EOSINOPHIL # BLD AUTO: 0.06 K/UL — SIGNIFICANT CHANGE UP (ref 0–0.7)
EOSINOPHIL # BLD AUTO: 0.09 K/UL — SIGNIFICANT CHANGE UP (ref 0–0.7)
EOSINOPHIL NFR BLD AUTO: 0.7 % — SIGNIFICANT CHANGE UP (ref 0–8)
EOSINOPHIL NFR BLD AUTO: 1 % — SIGNIFICANT CHANGE UP (ref 0–8)
FERRITIN SERPL-MCNC: 48 NG/ML — SIGNIFICANT CHANGE UP (ref 15–150)
HCT VFR BLD CALC: 19.9 % — LOW (ref 37–47)
HCT VFR BLD CALC: 22 % — LOW (ref 37–47)
HGB BLD-MCNC: 6.3 G/DL — CRITICAL LOW (ref 12–16)
HGB BLD-MCNC: 6.9 G/DL — CRITICAL LOW (ref 12–16)
IMM GRANULOCYTES NFR BLD AUTO: 0.4 % — HIGH (ref 0.1–0.3)
IMM GRANULOCYTES NFR BLD AUTO: 0.5 % — HIGH (ref 0.1–0.3)
LYMPHOCYTES # BLD AUTO: 1.67 K/UL — SIGNIFICANT CHANGE UP (ref 1.2–3.4)
LYMPHOCYTES # BLD AUTO: 1.97 K/UL — SIGNIFICANT CHANGE UP (ref 1.2–3.4)
LYMPHOCYTES # BLD AUTO: 19.9 % — LOW (ref 20.5–51.1)
LYMPHOCYTES # BLD AUTO: 22.9 % — SIGNIFICANT CHANGE UP (ref 20.5–51.1)
MCHC RBC-ENTMCNC: 26.3 PG — LOW (ref 27–31)
MCHC RBC-ENTMCNC: 26.5 PG — LOW (ref 27–31)
MCHC RBC-ENTMCNC: 31.4 G/DL — LOW (ref 32–37)
MCHC RBC-ENTMCNC: 31.7 G/DL — LOW (ref 32–37)
MCV RBC AUTO: 83.6 FL — SIGNIFICANT CHANGE UP (ref 81–99)
MCV RBC AUTO: 84 FL — SIGNIFICANT CHANGE UP (ref 81–99)
MONOCYTES # BLD AUTO: 0.74 K/UL — HIGH (ref 0.1–0.6)
MONOCYTES # BLD AUTO: 0.75 K/UL — HIGH (ref 0.1–0.6)
MONOCYTES NFR BLD AUTO: 8.6 % — SIGNIFICANT CHANGE UP (ref 1.7–9.3)
MONOCYTES NFR BLD AUTO: 8.9 % — SIGNIFICANT CHANGE UP (ref 1.7–9.3)
NEUTROPHILS # BLD AUTO: 5.72 K/UL — SIGNIFICANT CHANGE UP (ref 1.4–6.5)
NEUTROPHILS # BLD AUTO: 5.86 K/UL — SIGNIFICANT CHANGE UP (ref 1.4–6.5)
NEUTROPHILS NFR BLD AUTO: 66.4 % — SIGNIFICANT CHANGE UP (ref 42.2–75.2)
NEUTROPHILS NFR BLD AUTO: 69.6 % — SIGNIFICANT CHANGE UP (ref 42.2–75.2)
NRBC # BLD: 0 /100 WBCS — SIGNIFICANT CHANGE UP (ref 0–0)
NRBC # BLD: 0 /100 WBCS — SIGNIFICANT CHANGE UP (ref 0–0)
PLATELET # BLD AUTO: 209 K/UL — SIGNIFICANT CHANGE UP (ref 130–400)
PLATELET # BLD AUTO: 251 K/UL — SIGNIFICANT CHANGE UP (ref 130–400)
RBC # BLD: 2.38 M/UL — LOW (ref 4.2–5.4)
RBC # BLD: 2.62 M/UL — LOW (ref 4.2–5.4)
RBC # FLD: 13.4 % — SIGNIFICANT CHANGE UP (ref 11.5–14.5)
RBC # FLD: 13.5 % — SIGNIFICANT CHANGE UP (ref 11.5–14.5)
WBC # BLD: 8.41 K/UL — SIGNIFICANT CHANGE UP (ref 4.8–10.8)
WBC # BLD: 8.61 K/UL — SIGNIFICANT CHANGE UP (ref 4.8–10.8)
WBC # FLD AUTO: 8.41 K/UL — SIGNIFICANT CHANGE UP (ref 4.8–10.8)
WBC # FLD AUTO: 8.61 K/UL — SIGNIFICANT CHANGE UP (ref 4.8–10.8)

## 2019-09-19 PROCEDURE — 99232 SBSQ HOSP IP/OBS MODERATE 35: CPT

## 2019-09-19 RX ORDER — IRON SUCROSE 20 MG/ML
200 INJECTION, SOLUTION INTRAVENOUS ONCE
Refills: 0 | Status: DISCONTINUED | OUTPATIENT
Start: 2019-09-19 | End: 2019-09-19

## 2019-09-19 RX ADMIN — GABAPENTIN 300 MILLIGRAM(S): 400 CAPSULE ORAL at 23:45

## 2019-09-19 RX ADMIN — Medication 325 MILLIGRAM(S): at 05:25

## 2019-09-19 RX ADMIN — Medication 600 MILLIGRAM(S): at 12:59

## 2019-09-19 RX ADMIN — SIMETHICONE 80 MILLIGRAM(S): 80 TABLET, CHEWABLE ORAL at 02:39

## 2019-09-19 RX ADMIN — Medication 0.2 MILLIGRAM(S): at 05:24

## 2019-09-19 RX ADMIN — Medication 100 MILLIGRAM(S): at 17:45

## 2019-09-19 RX ADMIN — Medication 600 MILLIGRAM(S): at 23:45

## 2019-09-19 RX ADMIN — SIMETHICONE 80 MILLIGRAM(S): 80 TABLET, CHEWABLE ORAL at 13:00

## 2019-09-19 RX ADMIN — Medication 0.2 MILLIGRAM(S): at 12:58

## 2019-09-19 RX ADMIN — Medication 600 MILLIGRAM(S): at 13:01

## 2019-09-19 RX ADMIN — NORETHINDRONE 5 MILLIGRAM(S): 0.35 TABLET ORAL at 05:25

## 2019-09-19 RX ADMIN — Medication 600 MILLIGRAM(S): at 17:45

## 2019-09-19 RX ADMIN — SIMETHICONE 80 MILLIGRAM(S): 80 TABLET, CHEWABLE ORAL at 17:46

## 2019-09-19 RX ADMIN — Medication 325 MILLIGRAM(S): at 17:45

## 2019-09-19 RX ADMIN — NORETHINDRONE 5 MILLIGRAM(S): 0.35 TABLET ORAL at 17:47

## 2019-09-19 RX ADMIN — Medication 500 MILLIGRAM(S): at 12:58

## 2019-09-19 RX ADMIN — GABAPENTIN 300 MILLIGRAM(S): 400 CAPSULE ORAL at 12:59

## 2019-09-19 RX ADMIN — Medication 600 MILLIGRAM(S): at 05:24

## 2019-09-19 RX ADMIN — GABAPENTIN 300 MILLIGRAM(S): 400 CAPSULE ORAL at 17:45

## 2019-09-19 RX ADMIN — SIMETHICONE 80 MILLIGRAM(S): 80 TABLET, CHEWABLE ORAL at 21:45

## 2019-09-19 RX ADMIN — Medication 1 MILLIGRAM(S): at 12:59

## 2019-09-19 RX ADMIN — SIMETHICONE 80 MILLIGRAM(S): 80 TABLET, CHEWABLE ORAL at 05:25

## 2019-09-19 RX ADMIN — GABAPENTIN 300 MILLIGRAM(S): 400 CAPSULE ORAL at 05:24

## 2019-09-19 RX ADMIN — Medication 100 MILLIGRAM(S): at 05:25

## 2019-09-19 NOTE — PROGRESS NOTE ADULT - SUBJECTIVE AND OBJECTIVE BOX
PGY2 Note    Pt seen and examined at bedside. No overnight events, no new complaints. Recovering well, tolerating regular diet, passing gas, ambulating without difficulty. Has had a bowel movement. Denies chest pain, SOB, palpitations, dizziness, excessive bleeding, extremity pain or swelling, fevers, chills, N/V.    PHYSICAL EXAM:  Vital Signs Last 24 Hrs  T(F): 98.4 (19 Sep 2019 03:00), Max: 99 (18 Sep 2019 20:00)  HR: 107 (19 Sep 2019 03:00) (97 - 113)  BP: 113/56 (19 Sep 2019 03:00) (108/62 - 120/66)  RR: 18 (19 Sep 2019 03:00) (18 - 18)    General Appearance: NAD, AA0x3  Cardiac: RRR  Pulmonary: CTAB  Abdomen: soft, nontender, nondistended, no rebound, guarding, or rigidity. Pos BS.  Incision: clean, dry and intact; steri-strips in place  Extremities: no swelling or calf tenderness, or erythema      LABS:                                   7.2    8.93  )-----------( 234      ( 18 Sep 2019 21:00 )             22.8                         7.8    8.09  )-----------( 236      ( 18 Sep 2019 14:17 )             24.3                         7.0    8.54  )-----------( 219      ( 18 Sep 2019 06:44 )             21.9     09-18    142  |  106  |  8<L>  ----------------------------<  102<H>  4.6   |  27  |  0.6<L>    Ca    8.2<L>      18 Sep 2019 06:44  Phos  3.1     09-18  Mg     2.5     09-18    TPro  4.9<L>  /  Alb  3.3<L>  /  TBili  0.3  /  DBili  x   /  AST  23  /  ALT  8   /  AlkPhos  32  09-18    PT/INR - ( 18 Sep 2019 14:17 )   PT: 13.80 sec;   INR: 1.20 ratio         PTT - ( 18 Sep 2019 14:17 )  PTT:28.0 sec      MEDICATIONS  (STANDING):  ascorbic acid 500 milliGRAM(s) Oral daily  docusate sodium 100 milliGRAM(s) Oral two times a day  ferrous    sulfate 325 milliGRAM(s) Oral two times a day  folic acid 1 milliGRAM(s) Oral daily  gabapentin 300 milliGRAM(s) Oral every 6 hours  ibuprofen  Tablet. 600 milliGRAM(s) Oral every 6 hours  influenza   Vaccine 0.5 milliLiter(s) IntraMuscular once  lactated ringers. 1000 milliLiter(s) (125 mL/Hr) IV Continuous <Continuous>  methylergonovine 0.2 milliGRAM(s) Oral every 6 hours  norethindrone acetate 5 milliGRAM(s) Oral <User Schedule>  simethicone 80 milliGRAM(s) Chew every 4 hours    MEDICATIONS  (PRN):  oxyCODONE    IR 5 milliGRAM(s) Oral every 6 hours PRN Moderate Pain (4 - 6)

## 2019-09-19 NOTE — PROGRESS NOTE ADULT - ASSESSMENT
Patient is a 27 yo female P0 who underwent elective abdominal myomectomy for uterine fibroids causing anemia.  On pre-op, her HGB/H was 12.8/40.3, normocytic.  Post-op, H/H is 7.7/24.3, MCV 83.  Patient is a [employer] and refuses blood products.  H/H levels are likely d/t operative bleeding.  She has been given Ferrous sulfate 325mg BID po along w/ folic acid    Plan:  -drop in HH noted today .   -Ferritin is pending , but iron studies are normal , will not see drop in Iron stores as well as well microcytosis soon after acute event , in her case it was surgical intervention .  - Please give iv venofer 300 mg x 1 .   -Monitor for symptoms and bleeding Patient is a 29 yo female P0 who underwent elective abdominal myomectomy for uterine fibroids causing anemia.  On pre-op, her HGB/H was 12.8/40.3, normocytic.  Post-op, H/H is 7.7/24.3, MCV 83.  Patient is a [employer] and refuses blood products.  H/H levels are likely d/t operative bleeding.  She has been given Ferrous sulfate 325mg BID po along w/ folic acid    Plan:  -drop in HH noted today .   -Ferritin is pending , but iron studies are normal , will not see drop in Iron stores as well as well microcytosis soon after acute event , in her case it was surgical intervention .  - Please give iv venofer 200 mg x 1 .   -Monitor for symptoms and bleeding

## 2019-09-19 NOTE — PROGRESS NOTE ADULT - SUBJECTIVE AND OBJECTIVE BOX
Patient is a 28y old  Female who presents with a chief complaint of Pfannenstiel abdominal myomectomy for uterine fibroids (18 Sep 2019 15:31)      Subjective:      Vital Signs Last 24 Hrs  T(C): 36.2 (19 Sep 2019 07:30), Max: 37.2 (18 Sep 2019 20:00)  T(F): 97.1 (19 Sep 2019 07:30), Max: 99 (18 Sep 2019 20:00)  HR: 103 (19 Sep 2019 07:30) (103 - 113)  BP: 106/58 (19 Sep 2019 07:30) (106/58 - 118/57)  BP(mean): --  RR: 18 (19 Sep 2019 07:30) (18 - 18)  SpO2: --    PHYSICAL EXAM  General: adult in NAD  HEENT: clear oropharynx, anicteric sclera, pallor  Neck: supple  CV: Tachycardic, normal S1/S2 with no murmur rubs or gallops  Lungs: positive air movement b/l ant lungs, clear to auscultation, no wheezes, no rales  Abdomen: soft non-tender non-distended, no hepatosplenomegaly  Ext: no clubbing cyanosis or edema  Skin: no rashes and no petechiae  Neuro: alert and oriented X 4, no focal deficits    MEDICATIONS  (STANDING):  ascorbic acid 500 milliGRAM(s) Oral daily  docusate sodium 100 milliGRAM(s) Oral two times a day  ferrous    sulfate 325 milliGRAM(s) Oral two times a day  folic acid 1 milliGRAM(s) Oral daily  gabapentin 300 milliGRAM(s) Oral every 6 hours  ibuprofen  Tablet. 600 milliGRAM(s) Oral every 6 hours  influenza   Vaccine 0.5 milliLiter(s) IntraMuscular once  lactated ringers. 1000 milliLiter(s) (125 mL/Hr) IV Continuous <Continuous>  norethindrone acetate 5 milliGRAM(s) Oral <User Schedule>  simethicone 80 milliGRAM(s) Chew every 4 hours    MEDICATIONS  (PRN):  oxyCODONE    IR 5 milliGRAM(s) Oral every 6 hours PRN Moderate Pain (4 - 6)      LABS:                          6.3    8.41  )-----------( 209      ( 19 Sep 2019 06:11 )             19.9         Mean Cell Volume : 83.6 fL  Mean Cell Hemoglobin : 26.5 pg  Mean Cell Hemoglobin Concentration : 31.7 g/dL  Auto Neutrophil # : 5.86 K/uL  Auto Lymphocyte # : 1.67 K/uL  Auto Monocyte # : 0.75 K/uL  Auto Eosinophil # : 0.06 K/uL  Auto Basophil # : 0.04 K/uL  Auto Neutrophil % : 69.6 %  Auto Lymphocyte % : 19.9 %  Auto Monocyte % : 8.9 %  Auto Eosinophil % : 0.7 %  Auto Basophil % : 0.5 %      Serial CBC's  09-19 @ 06:11  Hct-19.9 / Hgb-6.3 / Plat-209 / RBC-2.38 / WBC-8.41  Serial CBC's  09-18 @ 21:00  Hct-22.8 / Hgb-7.2 / Plat-234 / RBC-2.70 / WBC-8.93  Serial CBC's  09-18 @ 14:17  Hct-24.3 / Hgb-7.8 / Plat-236 / RBC-2.89 / WBC-8.09  Serial CBC's  09-18 @ 06:44  Hct-21.9 / Hgb-7.0 / Plat-219 / RBC-2.62 / WBC-8.54  Serial CBC's  09-17 @ 23:30  Hct-23.0 / Hgb-7.4 / Plat-231 / RBC-2.76 / WBC-11.51  Serial CBC's  09-17 @ 20:05  Hct-24.3 / Hgb-7.7 / Plat-237 / RBC-2.91 / WBC-11.79      09-18    142  |  106  |  8<L>  ----------------------------<  102<H>  4.6   |  27  |  0.6<L>    Ca    8.2<L>      18 Sep 2019 06:44  Phos  3.1     09-18  Mg     2.5     09-18    TPro  4.9<L>  /  Alb  3.3<L>  /  TBili  0.3  /  DBili  x   /  AST  23  /  ALT  8   /  AlkPhos  32  09-18      PT/INR - ( 18 Sep 2019 14:17 )   PT: 13.80 sec;   INR: 1.20 ratio         PTT - ( 18 Sep 2019 14:17 )  PTT:28.0 sec    Iron - Total Binding Capacity.: 262 ug/dL (09-18 @ 21:00)  Reticulocyte Percent: 4.8 % (09-18 @ 21:00)                          BLOOD SMEAR INTERPRETATION:       RADIOLOGY & ADDITIONAL STUDIES: Patient is a 28y old  Female who presents with a chief complaint of Pfannenstiel abdominal myomectomy for uterine fibroids (18 Sep 2019 15:31)      Subjective: Pt seen and examined , no acute events reported overnight.       Vital Signs Last 24 Hrs  T(C): 36.2 (19 Sep 2019 07:30), Max: 37.2 (18 Sep 2019 20:00)  T(F): 97.1 (19 Sep 2019 07:30), Max: 99 (18 Sep 2019 20:00)  HR: 103 (19 Sep 2019 07:30) (103 - 113)  BP: 106/58 (19 Sep 2019 07:30) (106/58 - 118/57)  BP(mean): --  RR: 18 (19 Sep 2019 07:30) (18 - 18)  SpO2: --    PHYSICAL EXAM  General: adult in NAD  HEENT: clear oropharynx, anicteric sclera, pallor  Neck: supple  CV: Tachycardic, normal S1/S2 with no murmur rubs or gallops  Lungs: positive air movement b/l ant lungs, clear to auscultation, no wheezes, no rales  Abdomen: soft non-tender non-distended, no hepatosplenomegaly  Ext: no clubbing cyanosis or edema  Skin: no rashes and no petechiae  Neuro: alert and oriented X 4, no focal deficits    MEDICATIONS  (STANDING):  ascorbic acid 500 milliGRAM(s) Oral daily  docusate sodium 100 milliGRAM(s) Oral two times a day  ferrous    sulfate 325 milliGRAM(s) Oral two times a day  folic acid 1 milliGRAM(s) Oral daily  gabapentin 300 milliGRAM(s) Oral every 6 hours  ibuprofen  Tablet. 600 milliGRAM(s) Oral every 6 hours  influenza   Vaccine 0.5 milliLiter(s) IntraMuscular once  lactated ringers. 1000 milliLiter(s) (125 mL/Hr) IV Continuous <Continuous>  norethindrone acetate 5 milliGRAM(s) Oral <User Schedule>  simethicone 80 milliGRAM(s) Chew every 4 hours    MEDICATIONS  (PRN):  oxyCODONE    IR 5 milliGRAM(s) Oral every 6 hours PRN Moderate Pain (4 - 6)      LABS:                          6.3    8.41  )-----------( 209      ( 19 Sep 2019 06:11 )             19.9         Mean Cell Volume : 83.6 fL  Mean Cell Hemoglobin : 26.5 pg  Mean Cell Hemoglobin Concentration : 31.7 g/dL  Auto Neutrophil # : 5.86 K/uL  Auto Lymphocyte # : 1.67 K/uL  Auto Monocyte # : 0.75 K/uL  Auto Eosinophil # : 0.06 K/uL  Auto Basophil # : 0.04 K/uL  Auto Neutrophil % : 69.6 %  Auto Lymphocyte % : 19.9 %  Auto Monocyte % : 8.9 %  Auto Eosinophil % : 0.7 %  Auto Basophil % : 0.5 %      Serial CBC's  09-19 @ 06:11  Hct-19.9 / Hgb-6.3 / Plat-209 / RBC-2.38 / WBC-8.41  Serial CBC's  09-18 @ 21:00  Hct-22.8 / Hgb-7.2 / Plat-234 / RBC-2.70 / WBC-8.93  Serial CBC's  09-18 @ 14:17  Hct-24.3 / Hgb-7.8 / Plat-236 / RBC-2.89 / WBC-8.09  Serial CBC's  09-18 @ 06:44  Hct-21.9 / Hgb-7.0 / Plat-219 / RBC-2.62 / WBC-8.54  Serial CBC's  09-17 @ 23:30  Hct-23.0 / Hgb-7.4 / Plat-231 / RBC-2.76 / WBC-11.51  Serial CBC's  09-17 @ 20:05  Hct-24.3 / Hgb-7.7 / Plat-237 / RBC-2.91 / WBC-11.79      09-18    142  |  106  |  8<L>  ----------------------------<  102<H>  4.6   |  27  |  0.6<L>    Ca    8.2<L>      18 Sep 2019 06:44  Phos  3.1     09-18  Mg     2.5     09-18    TPro  4.9<L>  /  Alb  3.3<L>  /  TBili  0.3  /  DBili  x   /  AST  23  /  ALT  8   /  AlkPhos  32  09-18      PT/INR - ( 18 Sep 2019 14:17 )   PT: 13.80 sec;   INR: 1.20 ratio         PTT - ( 18 Sep 2019 14:17 )  PTT:28.0 sec    Iron - Total Binding Capacity.: 262 ug/dL (09-18 @ 21:00)  Reticulocyte Percent: 4.8 % (09-18 @ 21:00)                          BLOOD SMEAR INTERPRETATION:       RADIOLOGY & ADDITIONAL STUDIES:

## 2019-09-19 NOTE — PROGRESS NOTE ADULT - ASSESSMENT
29yo P0 with menorrhagia and fibroid uterus s/p abdominal myomectomy, EBL 600cc, POD2, with acute anemia likely secondary to perioperative blood loss, asymptomatic, doing well    -ambulate as tolerated  -Incentive spirometry encouraged  -f/u AM labs  -pain management PRN  -Regular diet  -vitals q routine  -continue with iron, Aygestin, folic acid, Methergine [id number] and vit C    Patient is a Protestant, she will not accept blood products or her own blood.    Dr. Daniels and Dr. Angela to be made aware

## 2019-09-20 ENCOUNTER — TRANSCRIPTION ENCOUNTER (OUTPATIENT)
Age: 29
End: 2019-09-20

## 2019-09-20 LAB
BASOPHILS # BLD AUTO: 0.03 K/UL — SIGNIFICANT CHANGE UP (ref 0–0.2)
BASOPHILS NFR BLD AUTO: 0.4 % — SIGNIFICANT CHANGE UP (ref 0–1)
EOSINOPHIL # BLD AUTO: 0.14 K/UL — SIGNIFICANT CHANGE UP (ref 0–0.7)
EOSINOPHIL NFR BLD AUTO: 2 % — SIGNIFICANT CHANGE UP (ref 0–8)
HCT VFR BLD CALC: 19.1 % — LOW (ref 37–47)
HGB BLD-MCNC: 6.1 G/DL — CRITICAL LOW (ref 12–16)
IMM GRANULOCYTES NFR BLD AUTO: 0.4 % — HIGH (ref 0.1–0.3)
LYMPHOCYTES # BLD AUTO: 1.93 K/UL — SIGNIFICANT CHANGE UP (ref 1.2–3.4)
LYMPHOCYTES # BLD AUTO: 27.5 % — SIGNIFICANT CHANGE UP (ref 20.5–51.1)
MCHC RBC-ENTMCNC: 26.8 PG — LOW (ref 27–31)
MCHC RBC-ENTMCNC: 31.9 G/DL — LOW (ref 32–37)
MCV RBC AUTO: 83.8 FL — SIGNIFICANT CHANGE UP (ref 81–99)
MONOCYTES # BLD AUTO: 0.61 K/UL — HIGH (ref 0.1–0.6)
MONOCYTES NFR BLD AUTO: 8.7 % — SIGNIFICANT CHANGE UP (ref 1.7–9.3)
NEUTROPHILS # BLD AUTO: 4.27 K/UL — SIGNIFICANT CHANGE UP (ref 1.4–6.5)
NEUTROPHILS NFR BLD AUTO: 61 % — SIGNIFICANT CHANGE UP (ref 42.2–75.2)
NRBC # BLD: 0 /100 WBCS — SIGNIFICANT CHANGE UP (ref 0–0)
PLATELET # BLD AUTO: 231 K/UL — SIGNIFICANT CHANGE UP (ref 130–400)
RBC # BLD: 2.28 M/UL — LOW (ref 4.2–5.4)
RBC # FLD: 13.6 % — SIGNIFICANT CHANGE UP (ref 11.5–14.5)
WBC # BLD: 7.01 K/UL — SIGNIFICANT CHANGE UP (ref 4.8–10.8)
WBC # FLD AUTO: 7.01 K/UL — SIGNIFICANT CHANGE UP (ref 4.8–10.8)

## 2019-09-20 PROCEDURE — 99232 SBSQ HOSP IP/OBS MODERATE 35: CPT

## 2019-09-20 RX ORDER — IBUPROFEN 200 MG
1 TABLET ORAL
Qty: 56 | Refills: 0
Start: 2019-09-20 | End: 2019-10-03

## 2019-09-20 RX ORDER — IRON SUCROSE 20 MG/ML
200 INJECTION, SOLUTION INTRAVENOUS ONCE
Refills: 0 | Status: COMPLETED | OUTPATIENT
Start: 2019-09-20 | End: 2019-09-20

## 2019-09-20 RX ORDER — FERROUS SULFATE 325(65) MG
1 TABLET ORAL
Qty: 90 | Refills: 0
Start: 2019-09-20 | End: 2019-10-19

## 2019-09-20 RX ORDER — FERROUS SULFATE 325(65) MG
1 TABLET ORAL
Qty: 0 | Refills: 0 | DISCHARGE

## 2019-09-20 RX ORDER — ERYTHROPOIETIN 10000 [IU]/ML
40000 INJECTION, SOLUTION INTRAVENOUS; SUBCUTANEOUS ONCE
Refills: 0 | Status: DISCONTINUED | OUTPATIENT
Start: 2019-09-20 | End: 2019-09-20

## 2019-09-20 RX ORDER — ERYTHROPOIETIN 10000 [IU]/ML
40000 INJECTION, SOLUTION INTRAVENOUS; SUBCUTANEOUS ONCE
Refills: 0 | Status: COMPLETED | OUTPATIENT
Start: 2019-09-20 | End: 2019-09-20

## 2019-09-20 RX ORDER — PREGABALIN 225 MG/1
1000 CAPSULE ORAL ONCE
Refills: 0 | Status: COMPLETED | OUTPATIENT
Start: 2019-09-20 | End: 2019-09-20

## 2019-09-20 RX ORDER — DOCUSATE SODIUM 100 MG
1 CAPSULE ORAL
Qty: 0 | Refills: 0 | DISCHARGE
Start: 2019-09-20

## 2019-09-20 RX ORDER — NORETHINDRONE 0.35 MG/1
1 TABLET ORAL
Qty: 0 | Refills: 0 | DISCHARGE

## 2019-09-20 RX ORDER — ACETAMINOPHEN 500 MG
1 TABLET ORAL
Qty: 56 | Refills: 0
Start: 2019-09-20 | End: 2019-10-03

## 2019-09-20 RX ORDER — DOCUSATE SODIUM 100 MG
1 CAPSULE ORAL
Qty: 60 | Refills: 0
Start: 2019-09-20 | End: 2019-10-19

## 2019-09-20 RX ADMIN — SIMETHICONE 80 MILLIGRAM(S): 80 TABLET, CHEWABLE ORAL at 05:40

## 2019-09-20 RX ADMIN — GABAPENTIN 300 MILLIGRAM(S): 400 CAPSULE ORAL at 18:06

## 2019-09-20 RX ADMIN — PREGABALIN 1000 MICROGRAM(S): 225 CAPSULE ORAL at 15:57

## 2019-09-20 RX ADMIN — Medication 500 MILLIGRAM(S): at 11:30

## 2019-09-20 RX ADMIN — Medication 600 MILLIGRAM(S): at 05:39

## 2019-09-20 RX ADMIN — GABAPENTIN 300 MILLIGRAM(S): 400 CAPSULE ORAL at 05:39

## 2019-09-20 RX ADMIN — ERYTHROPOIETIN 40000 UNIT(S): 10000 INJECTION, SOLUTION INTRAVENOUS; SUBCUTANEOUS at 15:55

## 2019-09-20 RX ADMIN — IRON SUCROSE 110 MILLIGRAM(S): 20 INJECTION, SOLUTION INTRAVENOUS at 09:00

## 2019-09-20 RX ADMIN — SIMETHICONE 80 MILLIGRAM(S): 80 TABLET, CHEWABLE ORAL at 18:06

## 2019-09-20 RX ADMIN — SIMETHICONE 80 MILLIGRAM(S): 80 TABLET, CHEWABLE ORAL at 03:02

## 2019-09-20 RX ADMIN — NORETHINDRONE 5 MILLIGRAM(S): 0.35 TABLET ORAL at 05:39

## 2019-09-20 RX ADMIN — Medication 325 MILLIGRAM(S): at 18:07

## 2019-09-20 RX ADMIN — GABAPENTIN 300 MILLIGRAM(S): 400 CAPSULE ORAL at 23:26

## 2019-09-20 RX ADMIN — GABAPENTIN 300 MILLIGRAM(S): 400 CAPSULE ORAL at 11:30

## 2019-09-20 RX ADMIN — SIMETHICONE 80 MILLIGRAM(S): 80 TABLET, CHEWABLE ORAL at 14:32

## 2019-09-20 RX ADMIN — Medication 1 MILLIGRAM(S): at 11:30

## 2019-09-20 RX ADMIN — Medication 600 MILLIGRAM(S): at 11:30

## 2019-09-20 RX ADMIN — SIMETHICONE 80 MILLIGRAM(S): 80 TABLET, CHEWABLE ORAL at 09:00

## 2019-09-20 RX ADMIN — Medication 325 MILLIGRAM(S): at 05:39

## 2019-09-20 RX ADMIN — Medication 100 MILLIGRAM(S): at 18:06

## 2019-09-20 RX ADMIN — NORETHINDRONE 5 MILLIGRAM(S): 0.35 TABLET ORAL at 18:06

## 2019-09-20 RX ADMIN — Medication 600 MILLIGRAM(S): at 18:08

## 2019-09-20 RX ADMIN — SIMETHICONE 80 MILLIGRAM(S): 80 TABLET, CHEWABLE ORAL at 21:35

## 2019-09-20 RX ADMIN — Medication 600 MILLIGRAM(S): at 18:06

## 2019-09-20 RX ADMIN — Medication 100 MILLIGRAM(S): at 05:39

## 2019-09-20 RX ADMIN — Medication 600 MILLIGRAM(S): at 11:31

## 2019-09-20 RX ADMIN — Medication 600 MILLIGRAM(S): at 23:26

## 2019-09-20 NOTE — PROGRESS NOTE ADULT - SUBJECTIVE AND OBJECTIVE BOX
PGY3 progress note    Discussion held with patient and her family again. Patient refusing discharge today. Agreeable for discharge tomorrow morning.   Prepare for discharge order was placed.   Explained to patient that hospital stay overnight might not be covered by insurance since she has no medical reason to extend her stay.     Dr. May and Dr. Angela aware.

## 2019-09-20 NOTE — PROGRESS NOTE ADULT - SUBJECTIVE AND OBJECTIVE BOX
PGY3 progress note    Called by nurse in 4B saying that patient is refusing to be discharged. Saw patient at bedside. Patient reports that she does not feel comfortable leaving the hospital with a low Hgb count. Reports she is asymptomatic and ambulating with no issues. Explained to patient that her anemia is secondary to blood loss from surgery and that it will take weeks-months for her Hgb to increase to normal range. We would usually give a transfusion to a patient with this level of Hgb but we are aware the patient is a Restorationism and accept her beliefs. We gave her in the hospital all treatments that were acceptable to her including venofer, EPO and B12. After discussion patient agreeable for plan to discharge. She has a f/u appointment with her PMD on 9/23/19 and will take PO iron and aygestin at home.     Nurse in 4B refused to discharge patient because she personally feels uncomfortable discharging a patient with a low Hgb and tachycardia. Nurse discussed case with ADN and then called me back asking to keep patient inhouse for further monitoring, repeat CBC in the morning and imaging to r/o intraabdominal bleed. I called ADN and explained that patient was observed postop for 3 days and had 8 repeat blood draws. Her Hgb now is stable at 6.1 and her HR is stable at the low 100s. Her anemia is secondary to acute blood loss from surgery and we have no suspicion of intraabdominal bleed. Therefore, no imaging is necessary. The tachycardia is a reflexive natural response to the patient's anemia and is expected. Any further blood draws will deplete her further from blood and delay her recovery. Patient is clinically stable for discharge and has a f/u appointment with her doctor. She is aware to return to the ED for any symptoms of headache, dizziness, chest pain, SOB, palpitations or LOC. ADN agreeable that patient is ok for discharge.    Dr. May and Dr. Angela aware. PGY3 progress note    Called by nurse in 4B saying that patient is refusing to be discharged. Saw patient at bedside. Patient reports that she does not feel comfortable leaving the hospital with a low Hgb count. Reports she is asymptomatic and ambulating with no issues. Explained to patient that her anemia is secondary to blood loss from surgery and that it will take weeks-months for her Hgb to increase to normal range. 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 We gave her in the hospital all treatments that were acceptable to her including venofer, EPO and B12. After discussion patient agreeable for plan to discharge. She has a f/u appointment with her PMD on 9/23/19 and will take PO iron and aygestin at home.     Nurse in 4B refused to discharge patient because she personally feels uncomfortable discharging a patient with a low Hgb and tachycardia. Nurse discussed case with ADN and then called me back asking to keep patient inhouse for further monitoring, repeat CBC in the morning and imaging to r/o intraabdominal bleed. I called ADN and explained that patient was observed postop for 3 days and had 8 repeat blood draws. Her Hgb now is stable at 6.1 and her HR is stable at the low 100s. Her anemia is secondary to acute blood loss from surgery and we have no suspicion of intraabdominal bleed. Therefore, no imaging is necessary. The tachycardia is a reflexive natural response to the patient's anemia and is expected. Any further blood draws will deplete her further from blood and delay her recovery. Patient is clinically stable for discharge and has a f/u appointment with her doctor. She is aware to return to the ED for any symptoms of headache, dizziness, chest pain, SOB, palpitations or LOC. ADN agreeable that patient is ok for discharge.    Dr. May and Dr. Angela aware.

## 2019-09-20 NOTE — PHARMACOTHERAPY INTERVENTION NOTE - COMMENTS
epogen order   --non renal pt. I s/w dr gillis([facility] Fellow). she said as per dr gonzalez--oncplogist pt is a jehova witness with Hb =6.1 and requires epogen epogen order   --non renal pt. I s/w dr gillis([facility] Fellow). she said as per dr gonzalez--oncplogist pt is a jehova witness with Hb =6.1 and requires epogen  I asked her to change to sq from ivp

## 2019-09-20 NOTE — DISCHARGE NOTE PROVIDER - NSDCCPCAREPLAN_GEN_ALL_CORE_FT
PRINCIPAL DISCHARGE DIAGNOSIS  Diagnosis: S/P myomectomy  Assessment and Plan of Treatment:       SECONDARY DISCHARGE DIAGNOSES  Diagnosis: Acute blood loss anemia  Assessment and Plan of Treatment:     Diagnosis: Fibroid uterus  Assessment and Plan of Treatment: PRINCIPAL DISCHARGE DIAGNOSIS  Diagnosis: S/P myomectomy  Assessment and Plan of Treatment: If you have a fever (100.4F or greater), severe pain, or heavy vaginal bleeding, inform your ob/gyn immediately.      SECONDARY DISCHARGE DIAGNOSES  Diagnosis: Acute blood loss anemia  Assessment and Plan of Treatment:     Diagnosis: Fibroid uterus  Assessment and Plan of Treatment:

## 2019-09-20 NOTE — DISCHARGE NOTE PROVIDER - CARE PROVIDER_API CALL
Salvador Angela)  Obstetrics and Gynecology; Reproductive EndoInfertility  [address] [place] [address]  Phone: [phone]  Fax: [phone]  Follow Up Time: Salvador Angela)  Obstetrics and Gynecology; Reproductive EndoInfertility  [address] [place] [address]  Phone: [phone]  Fax: [phone]  Follow Up Time: 2 weeks

## 2019-09-20 NOTE — PROGRESS NOTE ADULT - ASSESSMENT
27yo P0 with menorrhagia and fibroid uterus s/p abdominal myomectomy, EBL 600cc, POD3, with acute anemia likely secondary to perioperative blood loss, asymptomatic, doing well    -ambulate as tolerated  -Incentive spirometry encouraged  -f/u AM labs  -pain management PRN  -Regular diet  -vitals q routine, f/u HR  -continue with iron, Aygestin, folic acid, Methergine [id number] and vit C  -If CBC stable, anticipate d/c home today    Patient is a Anglican, she will not accept blood products or her own blood.    Dr. Daniels and Dr. Angela to be made aware

## 2019-09-20 NOTE — PROGRESS NOTE ADULT - ATTENDING COMMENTS
Pt with no active signs of bleeding, very stable.  Abdomin soft and non tender.  Discussed with patient about discharge.  Pt seems agreeable to be discharged tomorrow.
Pt seen and examined.  Agree with above A/P.  Pt is hemodynamically stable  had a long discussion with the pt. She was informed that despite IV iron and procrit the increase in Hgb may not occur for several days possibly week.  She understands and is in agreement with the plan.  Not agreeable to PRBC.

## 2019-09-20 NOTE — PROGRESS NOTE ADULT - SUBJECTIVE AND OBJECTIVE BOX
Patient is a 28y old  Female who presents with a chief complaint of Abdominal myomectomy (20 Sep 2019 06:20)      Subjective: Pt seen and examined , no acute events reported .       Vital Signs Last 24 Hrs  T(C): 36.8 (20 Sep 2019 07:30), Max: 36.8 (20 Sep 2019 07:30)  T(F): 98.3 (20 Sep 2019 07:30), Max: 98.3 (20 Sep 2019 07:30)  HR: 99 (20 Sep 2019 07:30) (92 - 113)  BP: 118/55 (20 Sep 2019 07:30) (100/55 - 118/55)  BP(mean): --  RR: 18 (20 Sep 2019 07:30) (18 - 18)  SpO2: --    PHYSICAL EXAM  General: adult in NAD  HEENT: clear oropharynx, anicteric sclera  CV: normal S1/S2  Lungs: positive air movement b/l ant lungs  Abdomen: tenderness to palpation ,  non-distended  Ext: no clubbing cyanosis or edema  Neuro: alert and oriented X 4, no focal deficits    MEDICATIONS  (STANDING):  ascorbic acid 500 milliGRAM(s) Oral daily  docusate sodium 100 milliGRAM(s) Oral two times a day  ferrous    sulfate 325 milliGRAM(s) Oral two times a day  folic acid 1 milliGRAM(s) Oral daily  gabapentin 300 milliGRAM(s) Oral every 6 hours  ibuprofen  Tablet. 600 milliGRAM(s) Oral every 6 hours  influenza   Vaccine 0.5 milliLiter(s) IntraMuscular once  lactated ringers. 1000 milliLiter(s) (125 mL/Hr) IV Continuous <Continuous>  norethindrone acetate 5 milliGRAM(s) Oral <User Schedule>  simethicone 80 milliGRAM(s) Chew every 4 hours    MEDICATIONS  (PRN):  oxyCODONE    IR 5 milliGRAM(s) Oral every 6 hours PRN Moderate Pain (4 - 6)      LABS:                          6.1    7.01  )-----------( 231      ( 20 Sep 2019 06:40 )             19.1         Mean Cell Volume : 83.8 fL  Mean Cell Hemoglobin : 26.8 pg  Mean Cell Hemoglobin Concentration : 31.9 g/dL  Auto Neutrophil # : 4.27 K/uL  Auto Lymphocyte # : 1.93 K/uL  Auto Monocyte # : 0.61 K/uL  Auto Eosinophil # : 0.14 K/uL  Auto Basophil # : 0.03 K/uL  Auto Neutrophil % : 61.0 %  Auto Lymphocyte % : 27.5 %  Auto Monocyte % : 8.7 %  Auto Eosinophil % : 2.0 %  Auto Basophil % : 0.4 %      Serial CBC's  09-20 @ 06:40  Hct-19.1 / Hgb-6.1 / Plat-231 / RBC-2.28 / WBC-7.01  Serial CBC's  09-19 @ 16:30  Hct-22.0 / Hgb-6.9 / Plat-251 / RBC-2.62 / WBC-8.61  Serial CBC's  09-19 @ 06:11  Hct-19.9 / Hgb-6.3 / Plat-209 / RBC-2.38 / WBC-8.41  Serial CBC's  09-18 @ 21:00  Hct-22.8 / Hgb-7.2 / Plat-234 / RBC-2.70 / WBC-8.93  Serial CBC's  09-18 @ 14:17  Hct-24.3 / Hgb-7.8 / Plat-236 / RBC-2.89 / WBC-8.09  Serial CBC's  09-18 @ 06:44  Hct-21.9 / Hgb-7.0 / Plat-219 / RBC-2.62 / WBC-8.54  Serial CBC's  09-17 @ 23:30  Hct-23.0 / Hgb-7.4 / Plat-231 / RBC-2.76 / WBC-11.51  Serial CBC's  09-17 @ 20:05  Hct-24.3 / Hgb-7.7 / Plat-237 / RBC-2.91 / WBC-11.79              PT/INR - ( 18 Sep 2019 14:17 )   PT: 13.80 sec;   INR: 1.20 ratio         PTT - ( 18 Sep 2019 14:17 )  PTT:28.0 sec    Iron - Total Binding Capacity.: 262 ug/dL (09-18 @ 21:00)  Ferritin, Serum: 48 ng/mL (09-18 @ 21:00)  Reticulocyte Percent: 4.8 % (09-18 @ 21:00)                          BLOOD SMEAR INTERPRETATION:       RADIOLOGY & ADDITIONAL STUDIES:

## 2019-09-20 NOTE — PROGRESS NOTE ADULT - ASSESSMENT
Patient is a 27 yo female P0 who underwent elective abdominal myomectomy for uterine fibroids causing anemia.  On pre-op, her HGB/H was 12.8/40.3, normocytic.  Post-op, H/H is 7.7/24.3, MCV 83.  Patient is a Jew and refuses blood products.  H/H levels are likely d/t operative bleeding.  She has been given Ferrous sulfate 325mg BID po along w/ folic acid.     Plan:  -drop in HH noted today .  Spoke with Gyn resident yesterday that pt should be administered IV venofer , team was told to let us know if have any difficulties . We never received a call from Gyn team and pt did not get it up until today -Will recommend to give procrit 40,000 units x 1 as well vitamin b12 1000 mcg x 1 .   - Please minimise the amount of blood taken during phelebotomies as well check blood every other day , not daily .   -Ferritin noted wnl , but iron studies are normal , will not see drop in Iron stores as well as well microcytosis soon after acute event , in her case it was surgical intervention .   -Monitor for symptoms and bleeding.  - will continue to follow. Patient is a 27 yo female P0 who underwent elective abdominal myomectomy for uterine fibroids causing anemia.  On pre-op, her HGB/H was 12.8/40.3, normocytic.  Post-op, H/H is 7.7/24.3, MCV 83.  Patient is a Cong[employer] and refuses blood products.  H/H levels are likely d/t operative bleeding.  She has been given Ferrous sulfate 325mg BID po along w/ folic acid.     Plan:  -drop in HH noted today .  Spoke with Gyn resident yesterday that pt should be administered IV venofer , however Venofer was not administered  until today -Will recommend to give procrit 40,000 units x 1 as well vitamin b12 1000 mcg x 1 Today .   Continue folic acid  - Please minimise the amount of blood taken during phelebotomies as well check blood every other day , not daily .   -Monitor for symptoms and bleeding.  - will continue to follow.

## 2019-09-20 NOTE — DISCHARGE NOTE PROVIDER - NSDCCPTREATMENT_GEN_ALL_CORE_FT
PRINCIPAL PROCEDURE  Procedure: Abdominal myomectomy  Findings and Treatment: via pfannensteil incision, uterine cavity entered

## 2019-09-20 NOTE — DISCHARGE NOTE NURSING/CASE MANAGEMENT/SOCIAL WORK - PATIENT PORTAL LINK FT
You can access the FollowMyHealth Patient Portal offered by [facility] by registering at the following website: [email or web address]. By joining [facility] [facility]’s FollowMyHealth portal, you will also be able to view your health information using other applications (apps) compatible with our system.

## 2019-09-20 NOTE — PROGRESS NOTE ADULT - SUBJECTIVE AND OBJECTIVE BOX
PGY2 Note    Pt seen and examined at bedside. No overnight events, no new complaints. Recovering well, tolerating regular diet, passing gas, ambulating without difficulty, voiding. Denies headaches, dizziness, chest pain, palpitations, SOB, extremity pain or swelling, fevers, chills, N/V. States that vaginal bleeding has stopped. Has had a bowel mvoement.      PHYSICAL EXAM:  T(F): 96.7 (09-20 @ 03:00), Max: 98.1 (09-19 @ 23:00)  BP: 101/63 (09-20 @ 03:00) (100/55 - 116/83)  RR: 18 (09-20 @ 03:00)  General Appearance: NAD, AA0x3  Cardiac: RRR  Pulmonary: CTAB  Abdomen: soft, nontender/appropriately tender, nondistended, no rebound, guarding, or rigidity. Pos BS.  Incision: clean, dry and intact; steri-strips in place  Extremities: no swelling or calf tenderness    LABS:                                   6.9    8.61  )-----------( 251      ( 19 Sep 2019 16:30 )             22.0                         6.3    8.41  )-----------( 209      ( 19 Sep 2019 06:11 )             19.9                         7.2    8.93  )-----------( 234      ( 18 Sep 2019 21:00 )             22.8     09-18    142  |  106  |  8<L>  ----------------------------<  102<H>  4.6   |  27  |  0.6<L>    Ca    8.2<L>      18 Sep 2019 06:44  Phos  3.1     09-18  Mg     2.5     09-18    TPro  4.9<L>  /  Alb  3.3<L>  /  TBili  0.3  /  DBili  x   /  AST  23  /  ALT  8   /  AlkPhos  32  09-18    PT/INR - ( 18 Sep 2019 14:17 )   PT: 13.80 sec;   INR: 1.20 ratio         PTT - ( 18 Sep 2019 14:17 )  PTT:28.0 sec    MEDICATIONS  (STANDING):  ascorbic acid 500 milliGRAM(s) Oral daily  docusate sodium 100 milliGRAM(s) Oral two times a day  ferrous    sulfate 325 milliGRAM(s) Oral two times a day  folic acid 1 milliGRAM(s) Oral daily  gabapentin 300 milliGRAM(s) Oral every 6 hours  ibuprofen  Tablet. 600 milliGRAM(s) Oral every 6 hours  influenza   Vaccine 0.5 milliLiter(s) IntraMuscular once  lactated ringers. 1000 milliLiter(s) (125 mL/Hr) IV Continuous <Continuous>  norethindrone acetate 5 milliGRAM(s) Oral <User Schedule>  simethicone 80 milliGRAM(s) Chew every 4 hours    MEDICATIONS  (PRN):  oxyCODONE    IR 5 milliGRAM(s) Oral every 6 hours PRN Moderate Pain (4 - 6)

## 2019-09-20 NOTE — PROVIDER CONTACT NOTE (OTHER) - SITUATION
pt has discharge orders in computer vital signs  b/p 122/63 hr 110 pulse ox 99 room air, rr 20 temperature 99.2 oral. patient original hr was in 80's when she was admitted, patient last hemoglobin 6.1

## 2019-09-21 VITALS
SYSTOLIC BLOOD PRESSURE: 106 MMHG | DIASTOLIC BLOOD PRESSURE: 57 MMHG | RESPIRATION RATE: 18 BRPM | TEMPERATURE: 99 F | HEART RATE: 95 BPM

## 2019-09-21 RX ADMIN — Medication 325 MILLIGRAM(S): at 06:35

## 2019-09-21 RX ADMIN — Medication 100 MILLIGRAM(S): at 06:36

## 2019-09-21 RX ADMIN — SIMETHICONE 80 MILLIGRAM(S): 80 TABLET, CHEWABLE ORAL at 06:36

## 2019-09-21 RX ADMIN — SIMETHICONE 80 MILLIGRAM(S): 80 TABLET, CHEWABLE ORAL at 14:25

## 2019-09-21 RX ADMIN — GABAPENTIN 300 MILLIGRAM(S): 400 CAPSULE ORAL at 12:25

## 2019-09-21 RX ADMIN — Medication 500 MILLIGRAM(S): at 12:25

## 2019-09-21 RX ADMIN — Medication 600 MILLIGRAM(S): at 06:35

## 2019-09-21 RX ADMIN — GABAPENTIN 300 MILLIGRAM(S): 400 CAPSULE ORAL at 06:36

## 2019-09-21 RX ADMIN — NORETHINDRONE 5 MILLIGRAM(S): 0.35 TABLET ORAL at 06:40

## 2019-09-21 RX ADMIN — Medication 1 MILLIGRAM(S): at 12:25

## 2019-09-21 RX ADMIN — Medication 600 MILLIGRAM(S): at 12:26

## 2019-09-21 RX ADMIN — Medication 600 MILLIGRAM(S): at 12:24

## 2019-09-21 RX ADMIN — SIMETHICONE 80 MILLIGRAM(S): 80 TABLET, CHEWABLE ORAL at 12:25

## 2019-09-21 NOTE — PROGRESS NOTE ADULT - SUBJECTIVE AND OBJECTIVE BOX
Chief Complaint:     HPI: Pt doing well, pain well controlled. No overnight events, no acute complaints. She denies weakness, dizziness, shortness of breath, chest pain, dysuria, hematuria. She has been ambulating, voiding, and tolerating regular diet without difficulty. She had a bowel movement last night.    ROS: Denies cardiovascular or respiratory symptoms    PAST MEDICAL & SURGICAL HISTORY:  Fibroids  S/P dilation and curettage      Physical Exam  Vital Signs Last 24 Hrs  T(F): 99.4 (21 Sep 2019 07:31), Max: 99.4 (20 Sep 2019 15:00)  HR: 95 (21 Sep 2019 07:31) (91 - 110)  BP: 106/57 (21 Sep 2019 07:31) (92/53 - 122/63)  RR: 18 (21 Sep 2019 07:31) (18 - 20)    Physical exam:  General - AAOx3, NAD  Heart - S1S2, RRR  Lungs - CTA BL  Abdomen:  - Soft, nontender, nondistended, BS+  - Clean, dry, intact steri strips in place over pfannenstiel skin incision  Pelvis/Vagina - No bleeding  Extremities - No calf tenderness, no swelling    Labs:                        6.1    7.01  )-----------( 231      ( 20 Sep 2019 06:40 )             19.1                         6.9    8.61  )-----------( 251      ( 19 Sep 2019 16:30 )             22.0     Type + Screen (09.18.19 @ 14:30)    ABO RH Interpretation: A POS    Antibody Screen: NEG (09-18-19 @ 14:30)

## 2019-09-21 NOTE — PROGRESS NOTE ADULT - ASSESSMENT
29yo P0 with menorrhagia and fibroid uterus s/p abdominal myomectomy, EBL 600cc, POD4, with acute anemia secondary to perioperative blood loss, asymptomatic, doing well    -ambulate as tolerated  -Incentive spirometry encouraged  -pain management PRN  -Regular diet  -vitals q routine, f/u HR  -continue with iron, Aygestin, folic acid, vit C  -S/p Venofer, procrit and vit B12 per hemeonc recommendations  -D/c home today, instructions and precautions discussed.    Patient is a Adventist, she will not accept blood products or her own blood.

## 2019-09-22 NOTE — PROVIDER CONTACT NOTE (OTHER) - ASSESSMENT
patients stated she would rather stay and not be discharged today and would like to know why cat scan was not ordered.
stated/actual

## 2019-09-23 LAB — SURGICAL PATHOLOGY STUDY: SIGNIFICANT CHANGE UP

## 2019-09-25 ENCOUNTER — LABORATORY RESULT (OUTPATIENT)
Age: 29
End: 2019-09-25

## 2019-09-25 ENCOUNTER — APPOINTMENT (OUTPATIENT)
Dept: HEMATOLOGY ONCOLOGY | Facility: CLINIC | Age: 29
End: 2019-09-25
Payer: COMMERCIAL

## 2019-09-25 ENCOUNTER — APPOINTMENT (OUTPATIENT)
Dept: INFUSION THERAPY | Facility: CLINIC | Age: 29
End: 2019-09-25

## 2019-09-25 VITALS
TEMPERATURE: 98.2 F | DIASTOLIC BLOOD PRESSURE: 71 MMHG | HEART RATE: 94 BPM | SYSTOLIC BLOOD PRESSURE: 122 MMHG | HEIGHT: 66 IN | BODY MASS INDEX: 27.8 KG/M2 | WEIGHT: 173 LBS

## 2019-09-25 DIAGNOSIS — D62 ACUTE POSTHEMORRHAGIC ANEMIA: ICD-10-CM

## 2019-09-25 DIAGNOSIS — Z87.42 PERSONAL HISTORY OF OTHER DISEASES OF THE FEMALE GENITAL TRACT: ICD-10-CM

## 2019-09-25 DIAGNOSIS — Z87.828 PERSONAL HISTORY OF OTHER (HEALED) PHYSICAL INJURY AND TRAUMA: ICD-10-CM

## 2019-09-25 DIAGNOSIS — Z82.49 FAMILY HISTORY OF ISCHEMIC HEART DISEASE AND OTHER DISEASES OF THE CIRCULATORY SYSTEM: ICD-10-CM

## 2019-09-25 DIAGNOSIS — Z86.2 PERSONAL HISTORY OF DISEASES OF THE BLOOD AND BLOOD-FORMING ORGANS AND CERTAIN DISORDERS INVOLVING THE IMMUNE MECHANISM: ICD-10-CM

## 2019-09-25 DIAGNOSIS — D25.2 SUBSEROSAL LEIOMYOMA OF UTERUS: ICD-10-CM

## 2019-09-25 DIAGNOSIS — D25.1 INTRAMURAL LEIOMYOMA OF UTERUS: ICD-10-CM

## 2019-09-25 DIAGNOSIS — Z78.9 OTHER SPECIFIED HEALTH STATUS: ICD-10-CM

## 2019-09-25 DIAGNOSIS — D25.9 LEIOMYOMA OF UTERUS, UNSPECIFIED: ICD-10-CM

## 2019-09-25 DIAGNOSIS — D50.0 IRON DEFICIENCY ANEMIA SECONDARY TO BLOOD LOSS (CHRONIC): ICD-10-CM

## 2019-09-25 DIAGNOSIS — N92.0 EXCESSIVE AND FREQUENT MENSTRUATION WITH REGULAR CYCLE: ICD-10-CM

## 2019-09-25 DIAGNOSIS — R00.0 TACHYCARDIA, UNSPECIFIED: ICD-10-CM

## 2019-09-25 LAB
HCT VFR BLD CALC: 28.6 %
HGB BLD-MCNC: 9 G/DL
MCHC RBC-ENTMCNC: 26.9 PG
MCHC RBC-ENTMCNC: 31.5 G/DL
MCV RBC AUTO: 85.4 FL
PLATELET # BLD AUTO: 470 K/UL
PMV BLD: 11.4 FL
RBC # BLD: 3.35 M/UL
RBC # FLD: 15.4 %
WBC # FLD AUTO: 7.33 K/UL

## 2019-09-25 PROCEDURE — 99215 OFFICE O/P EST HI 40 MIN: CPT

## 2019-09-25 RX ORDER — HYDROCORTISONE 20 MG
100 TABLET ORAL ONCE
Refills: 0 | Status: COMPLETED | OUTPATIENT
Start: 2019-09-25 | End: 2019-09-25

## 2019-09-25 RX ORDER — DIPHENHYDRAMINE HCL 50 MG
25 CAPSULE ORAL ONCE
Refills: 0 | Status: COMPLETED | OUTPATIENT
Start: 2019-09-25 | End: 2019-09-25

## 2019-09-25 RX ORDER — FERUMOXYTOL 510 MG/17ML
510 INJECTION INTRAVENOUS ONCE
Refills: 0 | Status: COMPLETED | OUTPATIENT
Start: 2019-09-25 | End: 2019-09-25

## 2019-09-25 RX ADMIN — Medication 100 MILLIGRAM(S): at 16:35

## 2019-09-25 RX ADMIN — Medication 100 MILLIGRAM(S): at 16:05

## 2019-09-25 RX ADMIN — FERUMOXYTOL 510 MILLIGRAM(S): 510 INJECTION INTRAVENOUS at 17:30

## 2019-09-25 RX ADMIN — Medication 151.5 MILLIGRAM(S): at 15:45

## 2019-09-25 RX ADMIN — FERUMOXYTOL 156 MILLIGRAM(S): 510 INJECTION INTRAVENOUS at 16:45

## 2019-09-25 RX ADMIN — Medication 25 MILLIGRAM(S): at 16:05

## 2019-10-04 RX ORDER — METHOCARBAMOL 500 MG/1
500 TABLET, FILM COATED ORAL
Qty: 10 | Refills: 0 | Status: DISCONTINUED | COMMUNITY
Start: 2019-05-16 | End: 2019-10-04

## 2019-10-04 NOTE — HISTORY OF PRESENT ILLNESS
[de-identified] : Patient is a 28y old  Female who presented  for scheduled abdominal myomectomy to [facility] last week.\par she was seen inpatient\par \par HPI:  Patient is a 27yo female P0 with H/o menorrhagia causing anemia.\par She was diagnosed with fibroid uterus and is s/p abdominal myomectomy EBL 600cc, developed  acute anemia, likely secondary to perioperative blood loss, asymptomatic, \par Patient is a Mormonism, and refuses to receive blood products or her own blood.\par \par She was treated with IV venofer and also received one dose of Procrit and B12.\par She takes folic acid 1mg daily.\par No new complaints today, feels well.\par No vaginal bleeding since DC.\par

## 2019-10-04 NOTE — ASSESSMENT
[FreeTextEntry1] : Patient is a 27 yo female P0 who underwent elective abdominal myomectomy for uterine fibroids leading to  anemia.  On pre-op, her HGB/Hct was 12.8/40.3, normocytic.  Post-op, H/H was 7.7/24.3, MCV 83.\par Patient is a Alevism and refuses blood products. Anemia likely d/t operative bleeding.\par Hgb has improved post treatment with IV iron, B12 and procrit\par \par RECOMMENDATIONS\par Will give another dose of IV iron Ferraheme 510mg x 1.\par Side effects discussed\par Continue MVI\par Hold off on procrit for now.\par GYN follow up.\par RTC in 3-4 weeks.\par

## 2019-10-11 ENCOUNTER — LABORATORY RESULT (OUTPATIENT)
Age: 29
End: 2019-10-11

## 2019-10-11 ENCOUNTER — APPOINTMENT (OUTPATIENT)
Dept: HEMATOLOGY ONCOLOGY | Facility: CLINIC | Age: 29
End: 2019-10-11

## 2019-10-11 DIAGNOSIS — Z00.00 ENCOUNTER FOR GENERAL ADULT MEDICAL EXAMINATION W/OUT ABNORMAL FINDINGS: ICD-10-CM

## 2019-10-15 LAB
FERRITIN SERPL-MCNC: 203 NG/ML
HCT VFR BLD CALC: 36.8 %
HGB BLD-MCNC: 11.4 G/DL
IRON SATN MFR SERPL: 33 %
IRON SERPL-MCNC: 102 UG/DL
MCHC RBC-ENTMCNC: 26.5 PG
MCHC RBC-ENTMCNC: 31 G/DL
MCV RBC AUTO: 85.6 FL
PLATELET # BLD AUTO: 406 K/UL
PMV BLD: 10.6 FL
RBC # BLD: 4.3 M/UL
RBC # FLD: 14.9 %
TIBC SERPL-MCNC: 310 UG/DL
UIBC SERPL-MCNC: 208 UG/DL
WBC # FLD AUTO: 3.52 K/UL

## 2019-10-31 ENCOUNTER — APPOINTMENT (OUTPATIENT)
Dept: HEMATOLOGY ONCOLOGY | Facility: CLINIC | Age: 29
End: 2019-10-31
Payer: COMMERCIAL

## 2019-10-31 ENCOUNTER — LABORATORY RESULT (OUTPATIENT)
Age: 29
End: 2019-10-31

## 2019-10-31 ENCOUNTER — OUTPATIENT (OUTPATIENT)
Dept: OUTPATIENT SERVICES | Facility: HOSPITAL | Age: 29
LOS: 1 days | Discharge: HOME | End: 2019-10-31

## 2019-10-31 VITALS
BODY MASS INDEX: 28.28 KG/M2 | RESPIRATION RATE: 14 BRPM | HEIGHT: 66 IN | SYSTOLIC BLOOD PRESSURE: 114 MMHG | WEIGHT: 176 LBS | DIASTOLIC BLOOD PRESSURE: 66 MMHG | TEMPERATURE: 98 F | HEART RATE: 87 BPM

## 2019-10-31 DIAGNOSIS — Z98.890 OTHER SPECIFIED POSTPROCEDURAL STATES: Chronic | ICD-10-CM

## 2019-10-31 DIAGNOSIS — Z78.9 OTHER SPECIFIED HEALTH STATUS: ICD-10-CM

## 2019-10-31 DIAGNOSIS — D64.9 ANEMIA, UNSPECIFIED: ICD-10-CM

## 2019-10-31 LAB
HCT VFR BLD CALC: 37.3 %
HGB BLD-MCNC: 11.9 G/DL
MCHC RBC-ENTMCNC: 26.2 PG
MCHC RBC-ENTMCNC: 31.9 G/DL
MCV RBC AUTO: 82.2 FL
PLATELET # BLD AUTO: 319 K/UL
PMV BLD: 10.7 FL
RBC # BLD: 4.54 M/UL
RBC # FLD: 13.2 %
WBC # FLD AUTO: 4.53 K/UL

## 2019-10-31 PROCEDURE — 99213 OFFICE O/P EST LOW 20 MIN: CPT

## 2019-10-31 NOTE — ASSESSMENT
[FreeTextEntry1] : Patient is a 30 yo female P0 who underwent elective abdominal myomectomy for uterine fibroids leading to  anemia.  On pre-op, her HGB/Hct was 12.8/40.3, normocytic.  Post-op, H/H was 7.7/24.3, MCV 83.\par Patient is a Adventist and refuses blood products. Anemia likely d/t operative bleeding and menorrhagia.\par Hgb has improved post treatment with IV iron, B12 and procrit\par \par RECOMMENDATIONS\par Pt should continue po iron, folic acid and B12, MVI.\par GYN follow up.\par RTC in 2 months.\par

## 2019-10-31 NOTE — HISTORY OF PRESENT ILLNESS
[de-identified] : Patient is a 28y old  Female who presented  for scheduled abdominal myomectomy to [facility] last week.\par she was seen inpatient\par \par HPI:  Patient is a 27yo female P0 with H/o menorrhagia causing anemia.\par She was diagnosed with fibroid uterus and is s/p abdominal myomectomy EBL 600cc, developed  acute anemia, likely secondary to perioperative blood loss, asymptomatic, \par Patient is a Mosque, and refuses to receive blood products or her own blood.\par \par She was treated with IV venofer and also received one dose of Procrit and B12.\par She takes folic acid 1mg daily.\par No new complaints today, feels well.\par No vaginal bleeding since DC.\par  [de-identified] : 10/31/19\par Pt is here for follow up.\par She recently had her menstrual period which she reports was heavy.\par She has stopped Aygestin,\par She has a follow up with her GYN next month.

## 2019-11-03 LAB — FERRITIN SERPL-MCNC: 86 NG/ML

## 2019-11-18 NOTE — PATIENT PROFILE ADULT - FALLEN IN THE PAST
no H Plasty Text: Given the location of the defect, shape of the defect and the proximity to free margins a H-plasty was deemed most appropriate for repair.  Using a sterile surgical marker, the appropriate advancement arms of the H-plasty were drawn incorporating the defect and placing the expected incisions within the relaxed skin tension lines where possible. The area thus outlined was incised deep to adipose tissue with a #15 scalpel blade. The skin margins were undermined to an appropriate distance in all directions utilizing iris scissors.  The opposing advancement arms were then advanced into place in opposite direction and anchored with interrupted buried subcutaneous sutures.

## 2019-12-19 ENCOUNTER — APPOINTMENT (OUTPATIENT)
Dept: INTERNAL MEDICINE | Facility: CLINIC | Age: 29
End: 2019-12-19

## 2020-01-07 ENCOUNTER — APPOINTMENT (OUTPATIENT)
Dept: HEMATOLOGY ONCOLOGY | Facility: CLINIC | Age: 30
End: 2020-01-07

## 2020-10-15 ENCOUNTER — EMERGENCY (EMERGENCY)
Facility: HOSPITAL | Age: 30
LOS: 0 days | Discharge: HOME | End: 2020-10-15
Attending: EMERGENCY MEDICINE | Admitting: EMERGENCY MEDICINE
Payer: COMMERCIAL

## 2020-10-15 VITALS
WEIGHT: 179.9 LBS | HEIGHT: 66 IN | HEART RATE: 82 BPM | OXYGEN SATURATION: 100 % | RESPIRATION RATE: 18 BRPM | TEMPERATURE: 99 F | DIASTOLIC BLOOD PRESSURE: 78 MMHG | SYSTOLIC BLOOD PRESSURE: 125 MMHG

## 2020-10-15 DIAGNOSIS — R68.84 JAW PAIN: ICD-10-CM

## 2020-10-15 DIAGNOSIS — K06.8 OTHER SPECIFIED DISORDERS OF GINGIVA AND EDENTULOUS ALVEOLAR RIDGE: ICD-10-CM

## 2020-10-15 DIAGNOSIS — Z98.890 OTHER SPECIFIED POSTPROCEDURAL STATES: Chronic | ICD-10-CM

## 2020-10-15 DIAGNOSIS — Z79.891 LONG TERM (CURRENT) USE OF OPIATE ANALGESIC: ICD-10-CM

## 2020-10-15 PROCEDURE — 99282 EMERGENCY DEPT VISIT SF MDM: CPT

## 2020-10-15 NOTE — CONSULT NOTE ADULT - SUBJECTIVE AND OBJECTIVE BOX
Patient is a 30y old  Female who presents with a chief complaint of mild facial swelling and pain that started 3 days ago and worsened over time.     HPI: Pain and swelling started about 3 days ago      PAST MEDICAL & SURGICAL HISTORY:  Fibroids    S/P dilation and curettage      ( - ) heart valve replacement  ( - ) joint replacement  ( - ) pregnancy    Allergies    No Known Allergies    FAMILY HISTORY:  FH: hypertension    Vital Signs Last 24 Hrs  T(C): 37.1 (15 Oct 2020 08:58), Max: 37.1 (15 Oct 2020 08:58)  T(F): 98.8 (15 Oct 2020 08:58), Max: 98.8 (15 Oct 2020 08:58)  HR: 82 (15 Oct 2020 08:58) (82 - 82)  BP: 125/78 (15 Oct 2020 08:58) (125/78 - 125/78)  BP(mean): --  RR: 18 (15 Oct 2020 08:58) (18 - 18)  SpO2: 100% (15 Oct 2020 08:58) (100% - 100%)      EOE:  TMJ ( - ) clicks                     ( - ) pops                     ( - ) crepitus             Mandible FROM             Facial bones and MOM grossly intact             ( + ) trismus             ( - ) lymphadenopathy             ( + ) swelling             ( - ) asymmetry             ( + ) palpation             ( - ) dyspnea             ( - ) dysphagia             ( - ) loss of consciousness    IOE:  permanent dentition: multiple carious teeth, grossly intact           hard/soft palate:  ( - ) palatal torus, No pathology noted           tongue/FOM No pathology noted           labial/buccal mucosa No pathology noted           ( - ) percussion           ( + ) palpation           ( + ) swelling            ( + ) abscess           ( + ) sinus tract    *DENTAL RADIOGRAPHS:  1 Panographic radiograph taken.   *ASSESSMENT:  30y old  Female who presents with a chief complaint of mild facial swelling and pain that started 3 days ago and worsened over time. No difficulty breathing/swallowing. No lymphadenopathy. Patient has mild right facial swelling. Intraorally the swelling is related to tooth #32. Panographic radiograph taken, reveals impacted tooth #32. Patient has trismus and could open to about the width of 1 and a half finger.   *PLAN:  Advise the patient to get evaluated by an oral surgeon. Prescribe Amoxicillin 500 mg and Ibuprofen 600 mg.   PROCEDURE:   Explained to patient she will need to be evaluated by an oral surgeon for the swelling and the wisdom teeth. Explained to patient she could be having a supernumerary impacted tooth on the left side and she will need to be evaluated by an oral surgeon for that also. Advised patient to return to ED if any difficulty breathing/swallowing arises or if her swelling/trismus worsens. Patient understands. Prescription sent out for Amoxicillin 500 mg Take 1 capsule (500 mg) by oral route 3 times per day. Ibuprofen 600 mg Take 1 tablet (600 mg) by oral route every 6 hours as needed for pain. Take with food.   RECOMMENDATIONS:  1) Amoxicillin 500 and Ibuprofen 600 mg  2) Dental F/U with outpatient dentist for comprehensive dental care.   3) If any difficulty swallowing/breathing, fever occur, return to ER.

## 2020-10-15 NOTE — ED PROVIDER NOTE - NS ED ROS FT
Constitutional: (-) fever  HEENT: tooth and jaw pain.   Neurological: (-) headache, (-) altered mental status  Except as documented in the HPI, all other systems are negative.

## 2020-10-15 NOTE — ED PROVIDER NOTE - CLINICAL SUMMARY MEDICAL DECISION MAKING FREE TEXT BOX
pt with "tooth pain". swelling of the check and gingival. concern for oral infection. pt to dental clinic straight from the ED.

## 2020-10-15 NOTE — ED PROVIDER NOTE - PHYSICAL EXAMINATION
VITAL SIGNS: I have reviewed nursing notes    CONSTITUTIONAL: Well-developed; well-nourished; in no acute distress.  SKIN: Skin exam is warm and dry   HEAD: Normocephalic; atraumatic.  EYES: PERRL, EOM intact; conjunctiva clear.  ENT: No nasal discharge; airway clear. ternder, erythematous swelling on the gingiva posterior to the last molar. mild trismus mild facial swelling. no dental tenderness.   NECK: Supple; non tender.  NEURO: Alert, oriented. Grossly unremarkable.   PSYCH: Cooperative, appropriate.

## 2020-10-15 NOTE — ED PROVIDER NOTE - OBJECTIVE STATEMENT
31 yo f with no significant PMHx here with 3 days of R tooth/jaw pain. Pain progressively worsened and cannot eat on that side now. Pt too OT c meds with some relief. No fever. No dental trauma.

## 2020-11-20 ENCOUNTER — TRANSCRIPTION ENCOUNTER (OUTPATIENT)
Age: 30
End: 2020-11-20

## 2020-12-11 ENCOUNTER — TRANSCRIPTION ENCOUNTER (OUTPATIENT)
Age: 30
End: 2020-12-11

## 2021-04-09 ENCOUNTER — OUTPATIENT (OUTPATIENT)
Dept: OUTPATIENT SERVICES | Facility: HOSPITAL | Age: 31
LOS: 1 days | Discharge: HOME | End: 2021-04-09

## 2021-04-09 VITALS
DIASTOLIC BLOOD PRESSURE: 75 MMHG | WEIGHT: 181 LBS | HEIGHT: 66 IN | OXYGEN SATURATION: 100 % | TEMPERATURE: 97 F | SYSTOLIC BLOOD PRESSURE: 120 MMHG | HEART RATE: 80 BPM | RESPIRATION RATE: 13 BRPM

## 2021-04-09 DIAGNOSIS — Z98.890 OTHER SPECIFIED POSTPROCEDURAL STATES: Chronic | ICD-10-CM

## 2021-04-09 DIAGNOSIS — D25.9 LEIOMYOMA OF UTERUS, UNSPECIFIED: ICD-10-CM

## 2021-04-09 DIAGNOSIS — Z01.818 ENCOUNTER FOR OTHER PREPROCEDURAL EXAMINATION: ICD-10-CM

## 2021-04-09 LAB
ALBUMIN SERPL ELPH-MCNC: 4.6 G/DL — SIGNIFICANT CHANGE UP (ref 3.5–5.2)
ALP SERPL-CCNC: 47 U/L — SIGNIFICANT CHANGE UP (ref 30–115)
ALT FLD-CCNC: 10 U/L — SIGNIFICANT CHANGE UP (ref 0–41)
ANION GAP SERPL CALC-SCNC: 9 MMOL/L — SIGNIFICANT CHANGE UP (ref 7–14)
APTT BLD: 37.2 SEC — SIGNIFICANT CHANGE UP (ref 27–39.2)
AST SERPL-CCNC: 21 U/L — SIGNIFICANT CHANGE UP (ref 0–41)
BASOPHILS # BLD AUTO: 0.04 K/UL — SIGNIFICANT CHANGE UP (ref 0–0.2)
BASOPHILS NFR BLD AUTO: 1.1 % — HIGH (ref 0–1)
BILIRUB SERPL-MCNC: 0.2 MG/DL — SIGNIFICANT CHANGE UP (ref 0.2–1.2)
BUN SERPL-MCNC: 11 MG/DL — SIGNIFICANT CHANGE UP (ref 10–20)
CALCIUM SERPL-MCNC: 9.4 MG/DL — SIGNIFICANT CHANGE UP (ref 8.5–10.1)
CHLORIDE SERPL-SCNC: 105 MMOL/L — SIGNIFICANT CHANGE UP (ref 98–110)
CO2 SERPL-SCNC: 28 MMOL/L — SIGNIFICANT CHANGE UP (ref 17–32)
CREAT SERPL-MCNC: 0.6 MG/DL — LOW (ref 0.7–1.5)
EOSINOPHIL # BLD AUTO: 0.05 K/UL — SIGNIFICANT CHANGE UP (ref 0–0.7)
EOSINOPHIL NFR BLD AUTO: 1.4 % — SIGNIFICANT CHANGE UP (ref 0–8)
GLUCOSE SERPL-MCNC: 60 MG/DL — LOW (ref 70–99)
HCT VFR BLD CALC: 37.4 % — SIGNIFICANT CHANGE UP (ref 37–47)
HGB BLD-MCNC: 11.7 G/DL — LOW (ref 12–16)
IMM GRANULOCYTES NFR BLD AUTO: 0.3 % — SIGNIFICANT CHANGE UP (ref 0.1–0.3)
INR BLD: 1.09 RATIO — SIGNIFICANT CHANGE UP (ref 0.65–1.3)
LYMPHOCYTES # BLD AUTO: 1.74 K/UL — SIGNIFICANT CHANGE UP (ref 1.2–3.4)
LYMPHOCYTES # BLD AUTO: 47.2 % — SIGNIFICANT CHANGE UP (ref 20.5–51.1)
MCHC RBC-ENTMCNC: 26.5 PG — LOW (ref 27–31)
MCHC RBC-ENTMCNC: 31.3 G/DL — LOW (ref 32–37)
MCV RBC AUTO: 84.8 FL — SIGNIFICANT CHANGE UP (ref 81–99)
MONOCYTES # BLD AUTO: 0.34 K/UL — SIGNIFICANT CHANGE UP (ref 0.1–0.6)
MONOCYTES NFR BLD AUTO: 9.2 % — SIGNIFICANT CHANGE UP (ref 1.7–9.3)
NEUTROPHILS # BLD AUTO: 1.51 K/UL — SIGNIFICANT CHANGE UP (ref 1.4–6.5)
NEUTROPHILS NFR BLD AUTO: 40.8 % — LOW (ref 42.2–75.2)
NRBC # BLD: 0 /100 WBCS — SIGNIFICANT CHANGE UP (ref 0–0)
PLATELET # BLD AUTO: 258 K/UL — SIGNIFICANT CHANGE UP (ref 130–400)
POTASSIUM SERPL-MCNC: 5.8 MMOL/L — HIGH (ref 3.5–5)
POTASSIUM SERPL-SCNC: 5.8 MMOL/L — HIGH (ref 3.5–5)
PROT SERPL-MCNC: 7.1 G/DL — SIGNIFICANT CHANGE UP (ref 6–8)
PROTHROM AB SERPL-ACNC: 12.5 SEC — SIGNIFICANT CHANGE UP (ref 9.95–12.87)
RBC # BLD: 4.41 M/UL — SIGNIFICANT CHANGE UP (ref 4.2–5.4)
RBC # FLD: 13.6 % — SIGNIFICANT CHANGE UP (ref 11.5–14.5)
SODIUM SERPL-SCNC: 142 MMOL/L — SIGNIFICANT CHANGE UP (ref 135–146)
WBC # BLD: 3.69 K/UL — LOW (ref 4.8–10.8)
WBC # FLD AUTO: 3.69 K/UL — LOW (ref 4.8–10.8)

## 2021-04-09 NOTE — H&P PST ADULT - MUSCULOSKELETAL
Pt notified orders are in the system   No joint pain, swelling or deformity; no limitation of movement

## 2021-04-09 NOTE — H&P PST ADULT - HISTORY OF PRESENT ILLNESS
30y Female presents today for presurgical testing for hysteroscopy, myosure myomectomy, possible laparoscopy. Patient states that she has had a myomectomy in 2019. She states that she has had difficulties with conceiving. She denies any abdominal pain or cramping but does endorse bloating.   Patient denies any CP, palpitations, SOB, cough, or dysuria. No recent URI or UTI.  Stated exercise tolerance is FOS 1           RODDY screen reviewed    Patient denies any recent personal exposure to COVID19. Denies any sick contacts. Patient denies any travel within the past 30 days. Patient was instructed to quarantine until after procedure    D25.9 / 51579, 76512  Uterine leiomyoma  Encounter for other preprocedural examination    PMH/PSH/FH  FH: hypertension  Uterine leiomyoma  Fibroids  S/P dilation and curettage    Anesthesia Alert  NO--Difficult Airway  NO--History of neck surgery or radiation  NO--Limited ROM of neck  NO--History of Malignant hyperthermia  NO--No personal or family history of Pseudocholinesterase deficiency.  NO--Prior Anesthesia Complication  NO--Latex Allergy  NO--Loose teeth  NO--History of Rheumatoid Arthritis  NO--RODDY  YES--Other - PATIENT DOES NOT WANT BLOOD TRANSFUSION RE: Yazidi    Patient states that this is their complete medical history and list of medications

## 2021-04-09 NOTE — H&P PST ADULT - NSANTHOSAYNRD_GEN_A_CORE
No. RODDY screening performed.  STOP BANG Legend: 0-2 = LOW Risk; 3-4 = INTERMEDIATE Risk; 5-8 = HIGH Risk

## 2021-04-13 ENCOUNTER — OUTPATIENT (OUTPATIENT)
Dept: OUTPATIENT SERVICES | Facility: HOSPITAL | Age: 31
LOS: 1 days | Discharge: HOME | End: 2021-04-13

## 2021-04-13 DIAGNOSIS — Z98.890 OTHER SPECIFIED POSTPROCEDURAL STATES: Chronic | ICD-10-CM

## 2021-04-13 DIAGNOSIS — Z02.9 ENCOUNTER FOR ADMINISTRATIVE EXAMINATIONS, UNSPECIFIED: ICD-10-CM

## 2021-04-13 DIAGNOSIS — Z11.59 ENCOUNTER FOR SCREENING FOR OTHER VIRAL DISEASES: ICD-10-CM

## 2021-04-13 LAB
POTASSIUM SERPL-MCNC: 4.6 MMOL/L — SIGNIFICANT CHANGE UP (ref 3.5–5)
POTASSIUM SERPL-SCNC: 4.6 MMOL/L — SIGNIFICANT CHANGE UP (ref 3.5–5)

## 2021-04-14 ENCOUNTER — RESULT REVIEW (OUTPATIENT)
Age: 31
End: 2021-04-14

## 2021-04-14 ENCOUNTER — OUTPATIENT (OUTPATIENT)
Dept: OUTPATIENT SERVICES | Facility: HOSPITAL | Age: 31
LOS: 1 days | Discharge: HOME | End: 2021-04-14
Payer: COMMERCIAL

## 2021-04-14 VITALS
DIASTOLIC BLOOD PRESSURE: 63 MMHG | HEIGHT: 66 IN | TEMPERATURE: 98 F | HEART RATE: 79 BPM | SYSTOLIC BLOOD PRESSURE: 106 MMHG | RESPIRATION RATE: 16 BRPM | WEIGHT: 179.9 LBS | OXYGEN SATURATION: 100 %

## 2021-04-14 VITALS — HEART RATE: 76 BPM | SYSTOLIC BLOOD PRESSURE: 115 MMHG | RESPIRATION RATE: 17 BRPM | DIASTOLIC BLOOD PRESSURE: 70 MMHG

## 2021-04-14 DIAGNOSIS — Z98.890 OTHER SPECIFIED POSTPROCEDURAL STATES: Chronic | ICD-10-CM

## 2021-04-14 PROCEDURE — 88305 TISSUE EXAM BY PATHOLOGIST: CPT | Mod: 26

## 2021-04-14 RX ORDER — SODIUM CHLORIDE 9 MG/ML
1000 INJECTION, SOLUTION INTRAVENOUS
Refills: 0 | Status: DISCONTINUED | OUTPATIENT
Start: 2021-04-14 | End: 2021-04-14

## 2021-04-14 RX ORDER — MORPHINE SULFATE 50 MG/1
4 CAPSULE, EXTENDED RELEASE ORAL
Refills: 0 | Status: DISCONTINUED | OUTPATIENT
Start: 2021-04-14 | End: 2021-04-14

## 2021-04-14 RX ORDER — ONDANSETRON 8 MG/1
4 TABLET, FILM COATED ORAL ONCE
Refills: 0 | Status: DISCONTINUED | OUTPATIENT
Start: 2021-04-14 | End: 2021-04-14

## 2021-04-14 RX ORDER — OXYCODONE AND ACETAMINOPHEN 5; 325 MG/1; MG/1
1 TABLET ORAL ONCE
Refills: 0 | Status: DISCONTINUED | OUTPATIENT
Start: 2021-04-14 | End: 2021-04-14

## 2021-04-14 NOTE — BRIEF OPERATIVE NOTE - NSICDXBRIEFPROCEDURE_GEN_ALL_CORE_FT
PROCEDURES:  Hysteroscopy, with dilation and curettage of uterus and polypectomy or uterine myomectomy using [employer] tissue removal system 14-Apr-2021 08:31:50  Salvador Angela

## 2021-04-14 NOTE — ASU DISCHARGE PLAN (ADULT/PEDIATRIC) - CARE PROVIDER_API CALL
Salvador Angela)  Obstetrics and Gynecology; Reproductive EndoInfertility  [address] [place] [address]  Phone: [phone]  Fax: [phone]  Follow Up Time: 2 weeks

## 2021-04-14 NOTE — ASU DISCHARGE PLAN (ADULT/PEDIATRIC) - PATIENT BELONGINGS
Detail Level: Detailed Quality 130: Documentation Of Current Medications In The Medical Record: Current Medications Documented Quality 110: Preventive Care And Screening: Influenza Immunization: Influenza Immunization previously received during influenza season Quality 431: Preventive Care And Screening: Unhealthy Alcohol Use - Screening: Patient screened for unhealthy alcohol use using a single question and scores less than 2 times per year Quality 111:Pneumonia Vaccination Status For Older Adults: Pneumococcal Vaccination Previously Received Quality 226: Preventive Care And Screening: Tobacco Use: Screening And Cessation Intervention: Patient screened for tobacco use and is an ex/non-smoker Patient's belongings returned

## 2021-04-15 LAB — SURGICAL PATHOLOGY STUDY: SIGNIFICANT CHANGE UP

## 2021-04-23 DIAGNOSIS — D25.0 SUBMUCOUS LEIOMYOMA OF UTERUS: ICD-10-CM

## 2021-09-12 ENCOUNTER — EMERGENCY (EMERGENCY)
Facility: HOSPITAL | Age: 31
LOS: 0 days | Discharge: HOME | End: 2021-09-13
Attending: EMERGENCY MEDICINE | Admitting: EMERGENCY MEDICINE
Payer: COMMERCIAL

## 2021-09-12 VITALS
WEIGHT: 181 LBS | OXYGEN SATURATION: 96 % | HEIGHT: 66 IN | DIASTOLIC BLOOD PRESSURE: 76 MMHG | RESPIRATION RATE: 18 BRPM | HEART RATE: 109 BPM | SYSTOLIC BLOOD PRESSURE: 130 MMHG | TEMPERATURE: 99 F

## 2021-09-12 DIAGNOSIS — O34.11 MATERNAL CARE FOR BENIGN TUMOR OF CORPUS UTERI, FIRST TRIMESTER: ICD-10-CM

## 2021-09-12 DIAGNOSIS — D25.9 LEIOMYOMA OF UTERUS, UNSPECIFIED: ICD-10-CM

## 2021-09-12 DIAGNOSIS — M79.18 MYALGIA, OTHER SITE: ICD-10-CM

## 2021-09-12 DIAGNOSIS — O20.0 THREATENED ABORTION: ICD-10-CM

## 2021-09-12 DIAGNOSIS — Z98.890 OTHER SPECIFIED POSTPROCEDURAL STATES: Chronic | ICD-10-CM

## 2021-09-12 DIAGNOSIS — O99.891 OTHER SPECIFIED DISEASES AND CONDITIONS COMPLICATING PREGNANCY: ICD-10-CM

## 2021-09-12 DIAGNOSIS — Z3A.01 LESS THAN 8 WEEKS GESTATION OF PREGNANCY: ICD-10-CM

## 2021-09-12 LAB
ALBUMIN SERPL ELPH-MCNC: 4.3 G/DL — SIGNIFICANT CHANGE UP (ref 3.5–5.2)
ALP SERPL-CCNC: 52 U/L — SIGNIFICANT CHANGE UP (ref 30–115)
ALT FLD-CCNC: 11 U/L — SIGNIFICANT CHANGE UP (ref 0–41)
ANION GAP SERPL CALC-SCNC: 10 MMOL/L — SIGNIFICANT CHANGE UP (ref 7–14)
APPEARANCE UR: CLEAR — SIGNIFICANT CHANGE UP
APTT BLD: 30.5 SEC — SIGNIFICANT CHANGE UP (ref 27–39.2)
AST SERPL-CCNC: 15 U/L — SIGNIFICANT CHANGE UP (ref 0–41)
BACTERIA # UR AUTO: NEGATIVE — SIGNIFICANT CHANGE UP
BASOPHILS # BLD AUTO: 0.03 K/UL — SIGNIFICANT CHANGE UP (ref 0–0.2)
BASOPHILS NFR BLD AUTO: 0.5 % — SIGNIFICANT CHANGE UP (ref 0–1)
BILIRUB SERPL-MCNC: 0.2 MG/DL — SIGNIFICANT CHANGE UP (ref 0.2–1.2)
BILIRUB UR-MCNC: NEGATIVE — SIGNIFICANT CHANGE UP
BLD GP AB SCN SERPL QL: SIGNIFICANT CHANGE UP
BUN SERPL-MCNC: 9 MG/DL — LOW (ref 10–20)
CALCIUM SERPL-MCNC: 9.5 MG/DL — SIGNIFICANT CHANGE UP (ref 8.5–10.1)
CHLORIDE SERPL-SCNC: 103 MMOL/L — SIGNIFICANT CHANGE UP (ref 98–110)
CO2 SERPL-SCNC: 25 MMOL/L — SIGNIFICANT CHANGE UP (ref 17–32)
COLOR SPEC: SIGNIFICANT CHANGE UP
CREAT SERPL-MCNC: 0.8 MG/DL — SIGNIFICANT CHANGE UP (ref 0.7–1.5)
DIFF PNL FLD: ABNORMAL
EOSINOPHIL # BLD AUTO: 0.11 K/UL — SIGNIFICANT CHANGE UP (ref 0–0.7)
EOSINOPHIL NFR BLD AUTO: 2 % — SIGNIFICANT CHANGE UP (ref 0–8)
EPI CELLS # UR: 1 /HPF — SIGNIFICANT CHANGE UP (ref 0–5)
GLUCOSE SERPL-MCNC: 101 MG/DL — HIGH (ref 70–99)
GLUCOSE UR QL: NEGATIVE — SIGNIFICANT CHANGE UP
HCG SERPL-ACNC: 2454 MIU/ML — HIGH
HCT VFR BLD CALC: 36.9 % — LOW (ref 37–47)
HGB BLD-MCNC: 11.8 G/DL — LOW (ref 12–16)
HYALINE CASTS # UR AUTO: 1 /LPF — SIGNIFICANT CHANGE UP (ref 0–7)
IMM GRANULOCYTES NFR BLD AUTO: 0.2 % — SIGNIFICANT CHANGE UP (ref 0.1–0.3)
INR BLD: 1.04 RATIO — SIGNIFICANT CHANGE UP (ref 0.65–1.3)
KETONES UR-MCNC: NEGATIVE — SIGNIFICANT CHANGE UP
LEUKOCYTE ESTERASE UR-ACNC: NEGATIVE — SIGNIFICANT CHANGE UP
LYMPHOCYTES # BLD AUTO: 1.71 K/UL — SIGNIFICANT CHANGE UP (ref 1.2–3.4)
LYMPHOCYTES # BLD AUTO: 31.1 % — SIGNIFICANT CHANGE UP (ref 20.5–51.1)
MCHC RBC-ENTMCNC: 26.5 PG — LOW (ref 27–31)
MCHC RBC-ENTMCNC: 32 G/DL — SIGNIFICANT CHANGE UP (ref 32–37)
MCV RBC AUTO: 82.7 FL — SIGNIFICANT CHANGE UP (ref 81–99)
MONOCYTES # BLD AUTO: 0.48 K/UL — SIGNIFICANT CHANGE UP (ref 0.1–0.6)
MONOCYTES NFR BLD AUTO: 8.7 % — SIGNIFICANT CHANGE UP (ref 1.7–9.3)
NEUTROPHILS # BLD AUTO: 3.15 K/UL — SIGNIFICANT CHANGE UP (ref 1.4–6.5)
NEUTROPHILS NFR BLD AUTO: 57.5 % — SIGNIFICANT CHANGE UP (ref 42.2–75.2)
NITRITE UR-MCNC: NEGATIVE — SIGNIFICANT CHANGE UP
NRBC # BLD: 0 /100 WBCS — SIGNIFICANT CHANGE UP (ref 0–0)
PH UR: 6 — SIGNIFICANT CHANGE UP (ref 5–8)
PLATELET # BLD AUTO: 211 K/UL — SIGNIFICANT CHANGE UP (ref 130–400)
POTASSIUM SERPL-MCNC: 4.6 MMOL/L — SIGNIFICANT CHANGE UP (ref 3.5–5)
POTASSIUM SERPL-SCNC: 4.6 MMOL/L — SIGNIFICANT CHANGE UP (ref 3.5–5)
PROT SERPL-MCNC: 7 G/DL — SIGNIFICANT CHANGE UP (ref 6–8)
PROT UR-MCNC: NEGATIVE — SIGNIFICANT CHANGE UP
PROTHROM AB SERPL-ACNC: 11.9 SEC — SIGNIFICANT CHANGE UP (ref 9.95–12.87)
RBC # BLD: 4.46 M/UL — SIGNIFICANT CHANGE UP (ref 4.2–5.4)
RBC # FLD: 13.3 % — SIGNIFICANT CHANGE UP (ref 11.5–14.5)
RBC CASTS # UR COMP ASSIST: 74 /HPF — HIGH (ref 0–4)
SODIUM SERPL-SCNC: 138 MMOL/L — SIGNIFICANT CHANGE UP (ref 135–146)
SP GR SPEC: 1.01 — SIGNIFICANT CHANGE UP (ref 1.01–1.03)
UROBILINOGEN FLD QL: SIGNIFICANT CHANGE UP
WBC # BLD: 5.49 K/UL — SIGNIFICANT CHANGE UP (ref 4.8–10.8)
WBC # FLD AUTO: 5.49 K/UL — SIGNIFICANT CHANGE UP (ref 4.8–10.8)
WBC UR QL: 1 /HPF — SIGNIFICANT CHANGE UP (ref 0–5)

## 2021-09-12 PROCEDURE — 76856 US EXAM PELVIC COMPLETE: CPT | Mod: 26

## 2021-09-12 PROCEDURE — 76815 OB US LIMITED FETUS(S): CPT | Mod: 26

## 2021-09-12 PROCEDURE — 99285 EMERGENCY DEPT VISIT HI MDM: CPT | Mod: 25

## 2021-09-12 RX ORDER — SODIUM CHLORIDE 9 MG/ML
1000 INJECTION INTRAMUSCULAR; INTRAVENOUS; SUBCUTANEOUS ONCE
Refills: 0 | Status: COMPLETED | OUTPATIENT
Start: 2021-09-12 | End: 2021-09-12

## 2021-09-12 RX ORDER — ACETAMINOPHEN 500 MG
650 TABLET ORAL ONCE
Refills: 0 | Status: COMPLETED | OUTPATIENT
Start: 2021-09-12 | End: 2021-09-12

## 2021-09-12 RX ADMIN — SODIUM CHLORIDE 1000 MILLILITER(S): 9 INJECTION INTRAMUSCULAR; INTRAVENOUS; SUBCUTANEOUS at 22:01

## 2021-09-12 RX ADMIN — Medication 650 MILLIGRAM(S): at 21:29

## 2021-09-12 NOTE — ED PROVIDER NOTE - CARE PROVIDER_API CALL
Salvador Angela)  Obstetrics and Gynecology; Reproductive EndoInfertility  [address] [place] [address]  Phone: [phone]  Fax: [phone]  Follow Up Time: 1-3 Days

## 2021-09-12 NOTE — ED PROVIDER NOTE - CARE PLAN
1 Principal Discharge DX:	Vaginal bleeding affecting early pregnancy  Secondary Diagnosis:	Left buttock pain

## 2021-09-12 NOTE — ED ADULT NURSE NOTE - OBJECTIVE STATEMENT
pt presents to ed pt presents to ed with c/o left buttock pain s/p progesterone injection yesterday.

## 2021-09-12 NOTE — ED PROVIDER NOTE - OBJECTIVE STATEMENT
29 y/o female  about 3 weeks pregnant via IVF followed by Dr. Angela with a PMH of uterine fibroids presents to the ED for evaluation of left buttock pain s/p progesterone injection yesterday. pt is on estraidol, and prednisone. pt reports she is applying cold compresses without improvement, and is having pain in her buttock with walking. pt reports she had IVF on 2021 and confirmed pregnancy was . pt was not given a due date, and has and appt with dr. bonilla tomorrow and first ultrasound is thursday. pt reports she was told shed have spotting a week after the procedure and she was. pt denies fever, chills, back pain, recent trauma, burning or pain with urination, urinary frequency, dizziness, n/v/d/c, or abdominal pain.

## 2021-09-12 NOTE — ED PROVIDER NOTE - CLINICAL SUMMARY MEDICAL DECISION MAKING FREE TEXT BOX
[facility] 2,454 , rh +, pelvic sono: + fibroid uterus, no IUP.  pt seen and eval by gyn > can d/c home, pt has appt with her gyn, Dr. Angela this afternoon.  they rec cold compress for buttock pain.  pt told to return to ER for increased bleeding, pain, fever, or any other new/concerning symptoms.  pt understands and agrees with plan.

## 2021-09-12 NOTE — ED PROVIDER NOTE - NS ED ROS FT
CONST: No fever, chills or bodyaches  EYES: No pain, redness, drainage or visual changes.  ENT: No ear pain or discharge, nasal discharge or congestion. No sore throat  CARD: No chest pain, palpitations  RESP: No SOB, cough, hemoptysis. No hx of asthma or COPD  GI: No abdominal pain, N/V/D  : No urinary symptoms. (+) spotting vaginal bleeding  MS: No joint pain, back pain or extremity pain/injury  SKIN: No rashes. (+) left buttock pain.   NEURO: No headache, dizziness, paresthesias or LOC

## 2021-09-12 NOTE — ED PROVIDER NOTE - ATTENDING CONTRIBUTION TO CARE
29 y/o female in ER for eval of L buttock pain and VB.  Pt is undergoing IVF with Dr. Angela, states she is 3 weeks pregnant.  Gets daily progesterone injections in her buttocks (alternates sides every day), states she's having pain to L buttock for 2 days.  no swelling to area, no redness, no drainage.  no f/c.  Pt also states she started having some pelvic cramping just upon ER arrival and now having VB with some clots.  no upper abd pain.  no n/v/d.  no cp/sob/palpitations.  no ha/dizziness/syncope/near syncope.  PE - nad, nc/at, eomi, perrl, op - clear, mmm, cta b/l, no w/r/r, rrr, abd- soft, nt/nd, nabs, buttocks 29 y/o female in ER for eval of L buttock pain and VB.  Pt is undergoing IVF with Dr. Angela, states she is 3 weeks pregnant.  Gets daily progesterone injections in her buttocks (alternates sides every day), states she's having pain to L buttock for 2 days.  no swelling to area, no redness, no drainage.  no f/c.  Pt also states she started having some pelvic cramping just upon ER arrival and now having VB with some clots.  no upper abd pain.  no n/v/d.  no cp/sob/palpitations.  no ha/dizziness/syncope/near syncope.  PE - nad, nc/at, eomi, perrl, op - clear, mmm, cta b/l, no w/r/r, rrr, abd- soft, nt/nd, nabs, L buttocks with mild tenderness, no erythema, no induration/swelling, no masses, from x 4, A&O x 3, no focal neuro deficits.  -check labs, pelvic sono 31 y/o female in ER for eval of L buttock pain and VB.  Pt is undergoing IVF with Dr. Angela, states she is 3 weeks pregnant.  Gets daily progesterone injections in her buttocks (alternates sides every day), states she's having pain to L buttock for 2 days.  no swelling to area, no redness, no drainage.  no f/c.  Pt also states she started having some pelvic cramping just upon ER arrival and now having VB with some clots.  no upper abd pain.  no n/v/d.  no cp/sob/palpitations.  no ha/dizziness/syncope/near syncope.  PE - nad, nc/at, eomi, perrl, op - clear, mmm, cta b/l, no w/r/r, rrr, abd- soft, nt/nd, nabs, pelvic as in PA note,  L buttocks with mild tenderness, no erythema, no induration/swelling, no masses, from x 4, A&O x 3, no focal neuro deficits.  -check labs, pelvic sono, gyn eval

## 2021-09-12 NOTE — ED PROVIDER NOTE - PHYSICAL EXAMINATION
Physical Exam    Vital Signs: I have reviewed the initial vital signs.  Constitutional: well-nourished, appears stated age, no acute distress  Eyes: Conjunctiva pink, Sclera clear  Cardiovascular: S1 and S2, regular rate, regular rhythm, well-perfused extremities, radial pulses equal and 2+ b/l.   Respiratory: unlabored respiratory effort, clear to auscultation bilaterally no wheezing, rales and rhonchi. pt is speaking full sentences. no accessory muscle use.   Gastrointestinal: soft, non-tender, nondistended abdomen, no pulsatile mass, normal bowl sounds, no rebound, no guarding, no organomegaly. no cva tenderness.   Genitourinary: when pt pulled her pants down for evaluation of buttocks, I noticed feminine pad soaked with blood and a blood clot. pt had blood in the vaginal vault. cervical os is closed. no cmt.   Musculoskeletal: supple neck, no lower extremity edema, no calf tenderness, no midline tenderness, no palpable spinal step offs  Integumentary: warm, dry, no rash  Neurologic: awake, alert, cranial nerves II-XII grossly intact, extremities’ motor and sensory functions grossly intact. finger to nose intact. negative pronator drift. negative romberg. steady gait.   Psychiatric: appropriate mood, appropriate affect

## 2021-09-12 NOTE — ED PROVIDER NOTE - PATIENT PORTAL LINK FT
You can access the FollowMyHealth Patient Portal offered by [facility] by registering at the following website: [email or web address]. By joining [facility] FollowMyHealth portal, you will also be able to view your health information using other applications (apps) compatible with our system.

## 2021-09-13 VITALS
RESPIRATION RATE: 18 BRPM | SYSTOLIC BLOOD PRESSURE: 108 MMHG | TEMPERATURE: 98 F | OXYGEN SATURATION: 99 % | DIASTOLIC BLOOD PRESSURE: 66 MMHG | HEART RATE: 93 BPM

## 2021-09-13 NOTE — CONSULT NOTE ADULT - ASSESSMENT
41yo  at 2w0d IVF pregnancy with threatened , currently clinically and hemodynamically stable, no acute gyn intervention at this time    -Patient counselled that she is at increased risk of miscarriage given vaginal bleeding, Patients bhcg need to be trended to assess whether or not that is currently taking place. patient to repeat hcg and sonogram tomorrow at Dr. Angela's office  -Progesterone injection given in upper outer quadrant of left buttock,   -patient counselled to take tylenol and continue warm compresses  -dispo per ED    Dr. Angela and Dr. Jonas aware

## 2021-09-13 NOTE — CONSULT NOTE ADULT - SUBJECTIVE AND OBJECTIVE BOX
Chief Complaint: left buttock pain    HPI: 39yo  2w0d GA by IVF pregnancy frozen transfer presenting to the ED with left buttock pain and vaginal bleeding. Patient states she has been having pain where she injects her progesterone, 7/10 in intensity, unrelieved by cold compresses. Patient spoke to Dr. Anegla earlier today who recommended warm compresses. Patient states she received minimal relief which prompted her to come to the ED. Patient incidentally noticed increased vaginal bleeding starting around . Patient states she had spotting earlier in the day but now she passed a small clot in the toilet. Patient had to change 1 pad over 4hrs. Patient denies headache, CP, SOB, abdominal pain, dysuria, LE pain or swelling. This is a highly desired pregnancy.    Denies the following: constitutional symptoms, visual symptoms, cardiovascular symptoms, respiratory symptoms, GI symptoms, musculoskeletal symptoms, skin symptoms, neurologic symptoms, hematologic symptoms, allergic symptoms, psychiatric symptoms  Except any pertinent positives listed.     Ob/Gyn History:                   LMP - IVF pregnancy                   h/o multiple fibroids Denies history of ovarian cysts, abnormal paps, or STIs    Home Medications:  prenatal vitamin: 1 tab(s) orally once a day (2021 07:10)    Allergies  No Known Allergies    PAST MEDICAL & SURGICAL HISTORY:  Fibroids  s/p myomectomy with Pfannenstiel incision    FAMILY HISTORY:  FH: hypertension    SOCIAL HISTORY: Denies cigarette use, alcohol use, or illicit drug use    Vital Signs Last 24 Hrs  T(F): 98.9 (12 Sep 2021 20:00), Max: 98.9 (12 Sep 2021 20:00)  HR: 109 (12 Sep 2021 20:00) (109 - 109)  BP: 130/76 (12 Sep 2021 20:00) (130/76 - 130/76)  RR: 18 (12 Sep 2021 20:00) (18 - 18)    General Appearance - AAOx3, NAD  Abdomen - Soft, nontender, nondistended, no rebound, no rigidity, no guarding, bowel sounds present  speculum: cervix visually closed, 5cc of blood in the vagina, no active bleeding    GYN/Pelvis:    Labia Majora - Normal  Labia Minora - Normal  Clitoris - Normal  Urethra - Normal  Vagina - Normal  Cervix - Normal, closed no CMT,     Uterus:  Size - Normal, 6 week sized  Tenderness - None  Mass - None  Freely mobile    Adnexa:  Masses - None  Tenderness - None      LABS:                        11.8   5.49  )-----------( 211      ( 12 Sep 2021 20:58 )             36.9     HCG Quantitative, Serum: 2454.0 mIU/mL (21 @ 20:58)    ABO RH Interpretation: A POS (21 @ 20:58)  Antibody Screen: NEG (21 @ 20:58)        138  |  103  |  9<L>  ----------------------------<  101<H>  4.6   |  25  |  0.8    Ca    9.5      12 Sep 2021 20:58    TPro  7.0  /  Alb  4.3  /  TBili  0.2  /  DBili  x   /  AST  15  /  ALT  11  /  AlkPhos  52      PT/INR - ( 12 Sep 2021 20:58 )   PT: 11.90 sec;   INR: 1.04 ratio         PTT - ( 12 Sep 2021 20:58 )  PTT:30.5 sec  Urinalysis Basic - ( 12 Sep 2021 22:00 )    Color: Light Yellow / Appearance: Clear / S.013 / pH: x  Gluc: x / Ketone: Negative  / Bili: Negative / Urobili: <2 mg/dL   Blood: x / Protein: Negative / Nitrite: Negative   Leuk Esterase: Negative / RBC: 74 /HPF / WBC 1 /HPF   Sq Epi: x / Non Sq Epi: 1 /HPF / Bacteria: Negative          RADIOLOGY & ADDITIONAL STUDIES:  < from: US Pelvis Complete (US Pelvis Complete .) (21 @ 23:01) >  EXAM:  US PELVIC COMPLETE            PROCEDURE DATE:  2021            INTERPRETATION:  CLINICAL HISTORY: IVF, vaginal bleeding    COMPARISON: Ultrasound dated 2018 and MRI of the pelvis dated 2018    PROCEDURE: Transabdominal ultrasound of the pelvis was performed, including Doppler.    LMP: 2021    FINDINGS:    UTERUS: Anteverted measuring 19.1 x 11.7 x 15.6 cm, with multiple intramural fibroids measuring up to 6 cm. The endometrial echo complex measures 1.1 cm, which is normal in thickness.    RIGHT OVARY: measures 1.6 x 3.7 x 1.8 cm, and is unremarkable. Normal Doppler waveforms are demonstrated to the right ovary.    LEFT OVARY: measures 3.1 x 1.6 x 1.6 cm, and is unremarkable. Normal Doppler waveforms are demonstrated in the left ovary.    OTHER: No free fluid in the pelvis.      IMPRESSION:      No evidence of intrauterine pregnancy on this transabdominal exam    No evidence of free fluid or definite adnexal mass    Multiple uterine fibroids are predominantly intramural       Chief Complaint: left buttock pain    HPI: 41yo  2w0d GA by IVF pregnancy frozen transfer presenting to the ED with left buttock pain and vaginal bleeding. Patient states she has been having pain where she injects her progesterone, 7/10 in intensity, unrelieved by cold compresses. Patient spoke to Dr. Angela earlier today who recommended warm compresses. Patient states she received minimal relief which prompted her to come to the ED. Patient incidentally noticed increased vaginal bleeding starting around . Patient states she had spotting earlier in the day but now she passed a small clot in the toilet. Patient had to change 1 pad over 4hrs. Patient denies headache, CP, SOB, abdominal pain, dysuria, LE pain or swelling. This is a highly desired pregnancy.    Denies the following: constitutional symptoms, visual symptoms, cardiovascular symptoms, respiratory symptoms, GI symptoms, musculoskeletal symptoms, skin symptoms, neurologic symptoms, hematologic symptoms, allergic symptoms, psychiatric symptoms  Except any pertinent positives listed.     Ob/Gyn History:                   LMP - IVF pregnancy                   h/o multiple fibroids Denies history of ovarian cysts, abnormal paps, or STIs    Home Medications:  prenatal vitamin: 1 tab(s) orally once a day (2021 07:10)    Allergies  No Known Allergies    PAST MEDICAL & SURGICAL HISTORY:  Fibroids  s/p myomectomy with Pfannenstiel incision    FAMILY HISTORY:  FH: hypertension    SOCIAL HISTORY: Denies cigarette use, alcohol use, or illicit drug use    Vital Signs Last 24 Hrs  T(F): 98.9 (12 Sep 2021 20:00), Max: 98.9 (12 Sep 2021 20:00)  HR: 109 (12 Sep 2021 20:00) (109 - 109)  BP: 130/76 (12 Sep 2021 20:00) (130/76 - 130/76)  RR: 18 (12 Sep 2021 20:00) (18 - 18)    General Appearance - AAOx3, NAD  Abdomen - Soft, nontender, nondistended, no rebound, no rigidity, no guarding, bowel sounds present  speculum: cervix visually closed, 5cc of blood in the vagina, no active bleeding    GYN/Pelvis:    Labia Majora - Normal  Labia Minora - Normal  Clitoris - Normal  Urethra - Normal  Vagina - Normal  Cervix - Normal, closed no CMT,     Uterus:  Size - Normal, 6 week sized  Tenderness - None  Mass - None  Freely mobile    Adnexa:  Masses - None  Tenderness - None      LABS:                        11.8   5.49  )-----------( 211      ( 12 Sep 2021 20:58 )             36.9     HCG Quantitative, Serum: 2454.0 mIU/mL (21 @ 20:58)    ABO RH Interpretation: A POS (21 @ 20:58)  Antibody Screen: NEG (21 @ 20:58)        138  |  103  |  9<L>  ----------------------------<  101<H>  4.6   |  25  |  0.8    Ca    9.5      12 Sep 2021 20:58    TPro  7.0  /  Alb  4.3  /  TBili  0.2  /  DBili  x   /  AST  15  /  ALT  11  /  AlkPhos  52      PT/INR - ( 12 Sep 2021 20:58 )   PT: 11.90 sec;   INR: 1.04 ratio         PTT - ( 12 Sep 2021 20:58 )  PTT:30.5 sec  Urinalysis Basic - ( 12 Sep 2021 22:00 )    Color: Light Yellow / Appearance: Clear / S.013 / pH: x  Gluc: x / Ketone: Negative  / Bili: Negative / Urobili: <2 mg/dL   Blood: x / Protein: Negative / Nitrite: Negative   Leuk Esterase: Negative / RBC: 74 /HPF / WBC 1 /HPF   Sq Epi: x / Non Sq Epi: 1 /HPF / Bacteria: Negative          RADIOLOGY & ADDITIONAL STUDIES:  < from: US Pelvis Complete (US Pelvis Complete .) (21 @ 23:01) >  EXAM:  US PELVIC COMPLETE            PROCEDURE DATE:  2021      INTERPRETATION:  CLINICAL HISTORY: IVF, vaginal bleeding    COMPARISON: Ultrasound dated 2018 and MRI of the pelvis dated 2018    PROCEDURE: Transabdominal ultrasound of the pelvis was performed, including Doppler.    LMP: 2021    FINDINGS:    UTERUS: Anteverted measuring 19.1 x 11.7 x 15.6 cm, with multiple intramural fibroids measuring up to 6 cm. The endometrial echo complex measures 1.1 cm, which is normal in thickness.    RIGHT OVARY: measures 1.6 x 3.7 x 1.8 cm, and is unremarkable. Normal Doppler waveforms are demonstrated to the right ovary.    LEFT OVARY: measures 3.1 x 1.6 x 1.6 cm, and is unremarkable. Normal Doppler waveforms are demonstrated in the left ovary.    OTHER: No free fluid in the pelvis.      IMPRESSION:      No evidence of intrauterine pregnancy on this transabdominal exam    No evidence of free fluid or definite adnexal mass    Multiple uterine fibroids are predominantly intramural

## 2021-09-17 ENCOUNTER — INPATIENT (INPATIENT)
Facility: HOSPITAL | Age: 31
LOS: 2 days | Discharge: HOME | End: 2021-09-20
Attending: SURGERY | Admitting: SURGERY
Payer: COMMERCIAL

## 2021-09-17 ENCOUNTER — RESULT REVIEW (OUTPATIENT)
Age: 31
End: 2021-09-17

## 2021-09-17 VITALS
TEMPERATURE: 97 F | WEIGHT: 181 LBS | SYSTOLIC BLOOD PRESSURE: 122 MMHG | HEART RATE: 100 BPM | OXYGEN SATURATION: 99 % | RESPIRATION RATE: 19 BRPM | HEIGHT: 66 IN | DIASTOLIC BLOOD PRESSURE: 88 MMHG

## 2021-09-17 DIAGNOSIS — Z98.890 OTHER SPECIFIED POSTPROCEDURAL STATES: Chronic | ICD-10-CM

## 2021-09-17 DIAGNOSIS — I80.219 PHLEBITIS AND THROMBOPHLEBITIS OF UNSPECIFIED ILIAC VEIN: ICD-10-CM

## 2021-09-17 LAB
ALBUMIN SERPL ELPH-MCNC: 3.7 G/DL — SIGNIFICANT CHANGE UP (ref 3.5–5.2)
ALBUMIN SERPL ELPH-MCNC: 4.4 G/DL — SIGNIFICANT CHANGE UP (ref 3.5–5.2)
ALP SERPL-CCNC: 58 U/L — SIGNIFICANT CHANGE UP (ref 30–115)
ALP SERPL-CCNC: 60 U/L — SIGNIFICANT CHANGE UP (ref 30–115)
ALT FLD-CCNC: 11 U/L — SIGNIFICANT CHANGE UP (ref 0–41)
ALT FLD-CCNC: 9 U/L — SIGNIFICANT CHANGE UP (ref 0–41)
ANION GAP SERPL CALC-SCNC: 13 MMOL/L — SIGNIFICANT CHANGE UP (ref 7–14)
ANION GAP SERPL CALC-SCNC: 14 MMOL/L — SIGNIFICANT CHANGE UP (ref 7–14)
ANION GAP SERPL CALC-SCNC: 15 MMOL/L — HIGH (ref 7–14)
APPEARANCE UR: CLEAR — SIGNIFICANT CHANGE UP
APTT BLD: 29.6 SEC — SIGNIFICANT CHANGE UP (ref 27–39.2)
APTT BLD: >200 SEC — CRITICAL HIGH (ref 27–39.2)
APTT BLD: >200 SEC — CRITICAL HIGH (ref 27–39.2)
AST SERPL-CCNC: 18 U/L — SIGNIFICANT CHANGE UP (ref 0–41)
AST SERPL-CCNC: 18 U/L — SIGNIFICANT CHANGE UP (ref 0–41)
BACTERIA # UR AUTO: NEGATIVE — SIGNIFICANT CHANGE UP
BASOPHILS # BLD AUTO: 0.02 K/UL — SIGNIFICANT CHANGE UP (ref 0–0.2)
BASOPHILS # BLD AUTO: 0.03 K/UL — SIGNIFICANT CHANGE UP (ref 0–0.2)
BASOPHILS # BLD AUTO: 0.03 K/UL — SIGNIFICANT CHANGE UP (ref 0–0.2)
BASOPHILS NFR BLD AUTO: 0.3 % — SIGNIFICANT CHANGE UP (ref 0–1)
BASOPHILS NFR BLD AUTO: 0.4 % — SIGNIFICANT CHANGE UP (ref 0–1)
BASOPHILS NFR BLD AUTO: 0.4 % — SIGNIFICANT CHANGE UP (ref 0–1)
BILIRUB SERPL-MCNC: 0.4 MG/DL — SIGNIFICANT CHANGE UP (ref 0.2–1.2)
BILIRUB SERPL-MCNC: 0.9 MG/DL — SIGNIFICANT CHANGE UP (ref 0.2–1.2)
BILIRUB UR-MCNC: NEGATIVE — SIGNIFICANT CHANGE UP
BLD GP AB SCN SERPL QL: SIGNIFICANT CHANGE UP
BUN SERPL-MCNC: 5 MG/DL — LOW (ref 10–20)
BUN SERPL-MCNC: 5 MG/DL — LOW (ref 10–20)
BUN SERPL-MCNC: 6 MG/DL — LOW (ref 10–20)
CALCIUM SERPL-MCNC: 8 MG/DL — LOW (ref 8.5–10.1)
CALCIUM SERPL-MCNC: 8.6 MG/DL — SIGNIFICANT CHANGE UP (ref 8.5–10.1)
CALCIUM SERPL-MCNC: 8.9 MG/DL — SIGNIFICANT CHANGE UP (ref 8.5–10.1)
CHLORIDE SERPL-SCNC: 103 MMOL/L — SIGNIFICANT CHANGE UP (ref 98–110)
CHLORIDE SERPL-SCNC: 104 MMOL/L — SIGNIFICANT CHANGE UP (ref 98–110)
CHLORIDE SERPL-SCNC: 97 MMOL/L — LOW (ref 98–110)
CO2 SERPL-SCNC: 18 MMOL/L — SIGNIFICANT CHANGE UP (ref 17–32)
CO2 SERPL-SCNC: 19 MMOL/L — SIGNIFICANT CHANGE UP (ref 17–32)
CO2 SERPL-SCNC: 22 MMOL/L — SIGNIFICANT CHANGE UP (ref 17–32)
COLOR SPEC: YELLOW — SIGNIFICANT CHANGE UP
CREAT SERPL-MCNC: 0.6 MG/DL — LOW (ref 0.7–1.5)
CREAT SERPL-MCNC: 0.6 MG/DL — LOW (ref 0.7–1.5)
CREAT SERPL-MCNC: 0.7 MG/DL — SIGNIFICANT CHANGE UP (ref 0.7–1.5)
DIFF PNL FLD: ABNORMAL
EOSINOPHIL # BLD AUTO: 0.01 K/UL — SIGNIFICANT CHANGE UP (ref 0–0.7)
EOSINOPHIL # BLD AUTO: 0.02 K/UL — SIGNIFICANT CHANGE UP (ref 0–0.7)
EOSINOPHIL # BLD AUTO: 0.07 K/UL — SIGNIFICANT CHANGE UP (ref 0–0.7)
EOSINOPHIL NFR BLD AUTO: 0.2 % — SIGNIFICANT CHANGE UP (ref 0–8)
EOSINOPHIL NFR BLD AUTO: 0.3 % — SIGNIFICANT CHANGE UP (ref 0–8)
EOSINOPHIL NFR BLD AUTO: 0.9 % — SIGNIFICANT CHANGE UP (ref 0–8)
EPI CELLS # UR: 2 /HPF — SIGNIFICANT CHANGE UP (ref 0–5)
GLUCOSE SERPL-MCNC: 106 MG/DL — HIGH (ref 70–99)
GLUCOSE SERPL-MCNC: 77 MG/DL — SIGNIFICANT CHANGE UP (ref 70–99)
GLUCOSE SERPL-MCNC: 80 MG/DL — SIGNIFICANT CHANGE UP (ref 70–99)
GLUCOSE UR QL: NEGATIVE — SIGNIFICANT CHANGE UP
HCG SERPL QL: POSITIVE — SIGNIFICANT CHANGE UP
HCG SERPL-ACNC: 1507 MIU/ML — HIGH
HCG SERPL-ACNC: 820.3 MIU/ML — HIGH
HCT VFR BLD CALC: 29.6 % — LOW (ref 37–47)
HCT VFR BLD CALC: 31.3 % — LOW (ref 37–47)
HCT VFR BLD CALC: 33.5 % — LOW (ref 37–47)
HGB BLD-MCNC: 10 G/DL — LOW (ref 12–16)
HGB BLD-MCNC: 10.8 G/DL — LOW (ref 12–16)
HGB BLD-MCNC: 9.5 G/DL — LOW (ref 12–16)
HYALINE CASTS # UR AUTO: 1 /LPF — SIGNIFICANT CHANGE UP (ref 0–7)
IMM GRANULOCYTES NFR BLD AUTO: 0.4 % — HIGH (ref 0.1–0.3)
IMM GRANULOCYTES NFR BLD AUTO: 0.4 % — HIGH (ref 0.1–0.3)
IMM GRANULOCYTES NFR BLD AUTO: 0.5 % — HIGH (ref 0.1–0.3)
INR BLD: 1.11 RATIO — SIGNIFICANT CHANGE UP (ref 0.65–1.3)
INR BLD: 1.17 RATIO — SIGNIFICANT CHANGE UP (ref 0.65–1.3)
INR BLD: 1.29 RATIO — SIGNIFICANT CHANGE UP (ref 0.65–1.3)
KETONES UR-MCNC: ABNORMAL
LEUKOCYTE ESTERASE UR-ACNC: NEGATIVE — SIGNIFICANT CHANGE UP
LYMPHOCYTES # BLD AUTO: 0.94 K/UL — LOW (ref 1.2–3.4)
LYMPHOCYTES # BLD AUTO: 1.01 K/UL — LOW (ref 1.2–3.4)
LYMPHOCYTES # BLD AUTO: 1.97 K/UL — SIGNIFICANT CHANGE UP (ref 1.2–3.4)
LYMPHOCYTES # BLD AUTO: 13.7 % — LOW (ref 20.5–51.1)
LYMPHOCYTES # BLD AUTO: 14.2 % — LOW (ref 20.5–51.1)
LYMPHOCYTES # BLD AUTO: 24.6 % — SIGNIFICANT CHANGE UP (ref 20.5–51.1)
MAGNESIUM SERPL-MCNC: 2.2 MG/DL — SIGNIFICANT CHANGE UP (ref 1.8–2.4)
MAGNESIUM SERPL-MCNC: 2.3 MG/DL — SIGNIFICANT CHANGE UP (ref 1.8–2.4)
MAGNESIUM SERPL-MCNC: 2.4 MG/DL — SIGNIFICANT CHANGE UP (ref 1.8–2.4)
MCHC RBC-ENTMCNC: 26.5 PG — LOW (ref 27–31)
MCHC RBC-ENTMCNC: 26.5 PG — LOW (ref 27–31)
MCHC RBC-ENTMCNC: 26.9 PG — LOW (ref 27–31)
MCHC RBC-ENTMCNC: 31.9 G/DL — LOW (ref 32–37)
MCHC RBC-ENTMCNC: 32.1 G/DL — SIGNIFICANT CHANGE UP (ref 32–37)
MCHC RBC-ENTMCNC: 32.2 G/DL — SIGNIFICANT CHANGE UP (ref 32–37)
MCV RBC AUTO: 82.3 FL — SIGNIFICANT CHANGE UP (ref 81–99)
MCV RBC AUTO: 82.8 FL — SIGNIFICANT CHANGE UP (ref 81–99)
MCV RBC AUTO: 83.9 FL — SIGNIFICANT CHANGE UP (ref 81–99)
MONOCYTES # BLD AUTO: 0.56 K/UL — SIGNIFICANT CHANGE UP (ref 0.1–0.6)
MONOCYTES # BLD AUTO: 0.67 K/UL — HIGH (ref 0.1–0.6)
MONOCYTES # BLD AUTO: 0.69 K/UL — HIGH (ref 0.1–0.6)
MONOCYTES NFR BLD AUTO: 10.1 % — HIGH (ref 1.7–9.3)
MONOCYTES NFR BLD AUTO: 7.6 % — SIGNIFICANT CHANGE UP (ref 1.7–9.3)
MONOCYTES NFR BLD AUTO: 8.6 % — SIGNIFICANT CHANGE UP (ref 1.7–9.3)
NEUTROPHILS # BLD AUTO: 4.94 K/UL — SIGNIFICANT CHANGE UP (ref 1.4–6.5)
NEUTROPHILS # BLD AUTO: 5.22 K/UL — SIGNIFICANT CHANGE UP (ref 1.4–6.5)
NEUTROPHILS # BLD AUTO: 5.71 K/UL — SIGNIFICANT CHANGE UP (ref 1.4–6.5)
NEUTROPHILS NFR BLD AUTO: 65.1 % — SIGNIFICANT CHANGE UP (ref 42.2–75.2)
NEUTROPHILS NFR BLD AUTO: 74.7 % — SIGNIFICANT CHANGE UP (ref 42.2–75.2)
NEUTROPHILS NFR BLD AUTO: 77.6 % — HIGH (ref 42.2–75.2)
NITRITE UR-MCNC: NEGATIVE — SIGNIFICANT CHANGE UP
NRBC # BLD: 0 /100 WBCS — SIGNIFICANT CHANGE UP (ref 0–0)
PH UR: 5.5 — SIGNIFICANT CHANGE UP (ref 5–8)
PHOSPHATE SERPL-MCNC: 3.6 MG/DL — SIGNIFICANT CHANGE UP (ref 2.1–4.9)
PLATELET # BLD AUTO: 140 K/UL — SIGNIFICANT CHANGE UP (ref 130–400)
PLATELET # BLD AUTO: 149 K/UL — SIGNIFICANT CHANGE UP (ref 130–400)
PLATELET # BLD AUTO: 175 K/UL — SIGNIFICANT CHANGE UP (ref 130–400)
POTASSIUM SERPL-MCNC: 4.1 MMOL/L — SIGNIFICANT CHANGE UP (ref 3.5–5)
POTASSIUM SERPL-MCNC: 4.1 MMOL/L — SIGNIFICANT CHANGE UP (ref 3.5–5)
POTASSIUM SERPL-MCNC: 4.6 MMOL/L — SIGNIFICANT CHANGE UP (ref 3.5–5)
POTASSIUM SERPL-SCNC: 4.1 MMOL/L — SIGNIFICANT CHANGE UP (ref 3.5–5)
POTASSIUM SERPL-SCNC: 4.1 MMOL/L — SIGNIFICANT CHANGE UP (ref 3.5–5)
POTASSIUM SERPL-SCNC: 4.6 MMOL/L — SIGNIFICANT CHANGE UP (ref 3.5–5)
PROT SERPL-MCNC: 6.4 G/DL — SIGNIFICANT CHANGE UP (ref 6–8)
PROT SERPL-MCNC: 7.3 G/DL — SIGNIFICANT CHANGE UP (ref 6–8)
PROT UR-MCNC: ABNORMAL
PROTHROM AB SERPL-ACNC: 12.7 SEC — SIGNIFICANT CHANGE UP (ref 9.95–12.87)
PROTHROM AB SERPL-ACNC: 13.4 SEC — HIGH (ref 9.95–12.87)
PROTHROM AB SERPL-ACNC: 14.8 SEC — HIGH (ref 9.95–12.87)
RAPID RVP RESULT: SIGNIFICANT CHANGE UP
RBC # BLD: 3.53 M/UL — LOW (ref 4.2–5.4)
RBC # BLD: 3.78 M/UL — LOW (ref 4.2–5.4)
RBC # BLD: 4.07 M/UL — LOW (ref 4.2–5.4)
RBC # FLD: 13.2 % — SIGNIFICANT CHANGE UP (ref 11.5–14.5)
RBC # FLD: 13.2 % — SIGNIFICANT CHANGE UP (ref 11.5–14.5)
RBC # FLD: 13.3 % — SIGNIFICANT CHANGE UP (ref 11.5–14.5)
RBC CASTS # UR COMP ASSIST: 74 /HPF — HIGH (ref 0–4)
SARS-COV-2 RNA SPEC QL NAA+PROBE: SIGNIFICANT CHANGE UP
SODIUM SERPL-SCNC: 132 MMOL/L — LOW (ref 135–146)
SODIUM SERPL-SCNC: 136 MMOL/L — SIGNIFICANT CHANGE UP (ref 135–146)
SODIUM SERPL-SCNC: 137 MMOL/L — SIGNIFICANT CHANGE UP (ref 135–146)
SP GR SPEC: 1.02 — SIGNIFICANT CHANGE UP (ref 1.01–1.03)
UROBILINOGEN FLD QL: SIGNIFICANT CHANGE UP
WBC # BLD: 6.61 K/UL — SIGNIFICANT CHANGE UP (ref 4.8–10.8)
WBC # BLD: 7.36 K/UL — SIGNIFICANT CHANGE UP (ref 4.8–10.8)
WBC # BLD: 8.01 K/UL — SIGNIFICANT CHANGE UP (ref 4.8–10.8)
WBC # FLD AUTO: 6.61 K/UL — SIGNIFICANT CHANGE UP (ref 4.8–10.8)
WBC # FLD AUTO: 7.36 K/UL — SIGNIFICANT CHANGE UP (ref 4.8–10.8)
WBC # FLD AUTO: 8.01 K/UL — SIGNIFICANT CHANGE UP (ref 4.8–10.8)
WBC UR QL: 3 /HPF — SIGNIFICANT CHANGE UP (ref 0–5)

## 2021-09-17 PROCEDURE — 99223 1ST HOSP IP/OBS HIGH 75: CPT | Mod: 57

## 2021-09-17 PROCEDURE — 37252 INTRVASC US NONCORONARY 1ST: CPT | Mod: LT

## 2021-09-17 PROCEDURE — 99285 EMERGENCY DEPT VISIT HI MDM: CPT

## 2021-09-17 PROCEDURE — 36000 PLACE NEEDLE IN VEIN: CPT | Mod: 59

## 2021-09-17 PROCEDURE — 37238 OPEN/PERQ PLACE STENT SAME: CPT | Mod: LT

## 2021-09-17 PROCEDURE — 76937 US GUIDE VASCULAR ACCESS: CPT | Mod: 26,59

## 2021-09-17 PROCEDURE — 93970 EXTREMITY STUDY: CPT | Mod: 26

## 2021-09-17 PROCEDURE — 88304 TISSUE EXAM BY PATHOLOGIST: CPT | Mod: 26

## 2021-09-17 PROCEDURE — 75820 VEIN X-RAY ARM/LEG: CPT | Mod: 26,59

## 2021-09-17 PROCEDURE — 93926 LOWER EXTREMITY STUDY: CPT | Mod: 26

## 2021-09-17 PROCEDURE — 37187 VENOUS MECH THROMBECTOMY: CPT

## 2021-09-17 PROCEDURE — 76830 TRANSVAGINAL US NON-OB: CPT | Mod: 26

## 2021-09-17 RX ORDER — HYDROMORPHONE HYDROCHLORIDE 2 MG/ML
1 INJECTION INTRAMUSCULAR; INTRAVENOUS; SUBCUTANEOUS
Refills: 0 | Status: DISCONTINUED | OUTPATIENT
Start: 2021-09-17 | End: 2021-09-17

## 2021-09-17 RX ORDER — INFLUENZA VIRUS VACCINE 15; 15; 15; 15 UG/.5ML; UG/.5ML; UG/.5ML; UG/.5ML
0.5 SUSPENSION INTRAMUSCULAR ONCE
Refills: 0 | Status: COMPLETED | OUTPATIENT
Start: 2021-09-17 | End: 2021-09-17

## 2021-09-17 RX ORDER — OXYCODONE HYDROCHLORIDE 5 MG/1
5 TABLET ORAL EVERY 6 HOURS
Refills: 0 | Status: DISCONTINUED | OUTPATIENT
Start: 2021-09-17 | End: 2021-09-20

## 2021-09-17 RX ORDER — HEPARIN SODIUM 5000 [USP'U]/ML
INJECTION INTRAVENOUS; SUBCUTANEOUS
Qty: 25000 | Refills: 0 | Status: DISCONTINUED | OUTPATIENT
Start: 2021-09-17 | End: 2021-09-17

## 2021-09-17 RX ORDER — SODIUM CHLORIDE 9 MG/ML
1000 INJECTION, SOLUTION INTRAVENOUS
Refills: 0 | Status: DISCONTINUED | OUTPATIENT
Start: 2021-09-17 | End: 2021-09-17

## 2021-09-17 RX ORDER — MEPERIDINE HYDROCHLORIDE 50 MG/ML
12.5 INJECTION INTRAMUSCULAR; INTRAVENOUS; SUBCUTANEOUS
Refills: 0 | Status: DISCONTINUED | OUTPATIENT
Start: 2021-09-17 | End: 2021-09-17

## 2021-09-17 RX ORDER — HYDROMORPHONE HYDROCHLORIDE 2 MG/ML
0.5 INJECTION INTRAMUSCULAR; INTRAVENOUS; SUBCUTANEOUS
Refills: 0 | Status: DISCONTINUED | OUTPATIENT
Start: 2021-09-17 | End: 2021-09-17

## 2021-09-17 RX ORDER — MORPHINE SULFATE 50 MG/1
4 CAPSULE, EXTENDED RELEASE ORAL ONCE
Refills: 0 | Status: DISCONTINUED | OUTPATIENT
Start: 2021-09-17 | End: 2021-09-17

## 2021-09-17 RX ORDER — HEPARIN SODIUM 5000 [USP'U]/ML
1200 INJECTION INTRAVENOUS; SUBCUTANEOUS
Qty: 25000 | Refills: 0 | Status: DISCONTINUED | OUTPATIENT
Start: 2021-09-17 | End: 2021-09-17

## 2021-09-17 RX ORDER — HEPARIN SODIUM 5000 [USP'U]/ML
6500 INJECTION INTRAVENOUS; SUBCUTANEOUS ONCE
Refills: 0 | Status: COMPLETED | OUTPATIENT
Start: 2021-09-17 | End: 2021-09-17

## 2021-09-17 RX ORDER — ONDANSETRON 8 MG/1
4 TABLET, FILM COATED ORAL ONCE
Refills: 0 | Status: DISCONTINUED | OUTPATIENT
Start: 2021-09-17 | End: 2021-09-17

## 2021-09-17 RX ORDER — ACETAMINOPHEN 500 MG
975 TABLET ORAL ONCE
Refills: 0 | Status: COMPLETED | OUTPATIENT
Start: 2021-09-17 | End: 2021-09-17

## 2021-09-17 RX ADMIN — SODIUM CHLORIDE 100 MILLILITER(S): 9 INJECTION, SOLUTION INTRAVENOUS at 18:40

## 2021-09-17 RX ADMIN — HYDROMORPHONE HYDROCHLORIDE 1 MILLIGRAM(S): 2 INJECTION INTRAMUSCULAR; INTRAVENOUS; SUBCUTANEOUS at 19:17

## 2021-09-17 RX ADMIN — HEPARIN SODIUM 12 UNIT(S)/HR: 5000 INJECTION INTRAVENOUS; SUBCUTANEOUS at 20:09

## 2021-09-17 RX ADMIN — HEPARIN SODIUM 6500 UNIT(S): 5000 INJECTION INTRAVENOUS; SUBCUTANEOUS at 12:47

## 2021-09-17 RX ADMIN — Medication 975 MILLIGRAM(S): at 08:47

## 2021-09-17 RX ADMIN — HYDROMORPHONE HYDROCHLORIDE 1 MILLIGRAM(S): 2 INJECTION INTRAMUSCULAR; INTRAVENOUS; SUBCUTANEOUS at 17:23

## 2021-09-17 RX ADMIN — HEPARIN SODIUM 1500 UNIT(S)/HR: 5000 INJECTION INTRAVENOUS; SUBCUTANEOUS at 12:47

## 2021-09-17 RX ADMIN — HYDROMORPHONE HYDROCHLORIDE 1 MILLIGRAM(S): 2 INJECTION INTRAMUSCULAR; INTRAVENOUS; SUBCUTANEOUS at 19:47

## 2021-09-17 RX ADMIN — HYDROMORPHONE HYDROCHLORIDE 1 MILLIGRAM(S): 2 INJECTION INTRAMUSCULAR; INTRAVENOUS; SUBCUTANEOUS at 18:26

## 2021-09-17 RX ADMIN — MORPHINE SULFATE 4 MILLIGRAM(S): 50 CAPSULE, EXTENDED RELEASE ORAL at 10:06

## 2021-09-17 NOTE — ED PROVIDER NOTE - PHYSICAL EXAMINATION
VITAL SIGNS: I have reviewed nursing notes and confirm.  CONSTITUTIONAL: uncomfortable appearing female in stretcher, tearful, NAD  SKIN: Warm dry, normal skin turgor  HEAD: NCAT  EYES: EOMI, no scleral icterus  ENT: Moist mucous membranes, normal pharynx with no erythema or exudates  NECK: Supple; non tender. Full ROM.   CARD: RRR, no murmurs, rubs or gallops  RESP: clear to ausculation b/l.  No rales, rhonchi, or wheezing.  ABD: soft, non-tender, non-distended, no rebound or guarding.   EXT: +swelling of LLE, notably larger than RLE from thigh down, +ttp, no erythema, compartments soft, DP pulses 2+ bl.   NEURO: alert and oriented, sensation intact and equal. unable to move LLE.  PSYCH: Cooperative, appropriate.

## 2021-09-17 NOTE — ED PROVIDER NOTE - ATTENDING CONTRIBUTION TO CARE
30yoF  at approx 3 wks' gestation, 2/2 IVF on  with Dr. Dominique (?[facility]), on estradione, progresterone, prednisone presents with severe pain in her L leg from proximal thigh to calf, worse with ROM of thigh, since last night. Also notes some vaginal spotting last night. Denies fall/trauma, abdominal or pelvic pain, vomiting, diarrhea, CP, SOB, fever, chills, and all other symptoms. Review of EMR shows pt was here 5 days ago with L buttock pain. On exam, afebrile, hemodynamically stable, saturating well, NAD, appears uncomfortable, no WOB, head NCAT, EOMI grossly, anicteric, MMM, no JVD, RRR, nml S1/S2, no m/r/g, lungs CTAB, no w/r/r, abd soft, NT, ND, nml BS, no rebound or guarding, AAO, CN's 3-12 grossly intact, no extrem deformity/stepoff, noted increased L calf circumference, no pitting edema, nml color/sensation, skin warm, well perfused, no rashes or hives. 30yoF  at approx 3 wks' gestation, 2/2 IVF on  with Dr. Dominique (?[facility]), on estradione, progresterone, prednisone presents with severe pain in her L leg from proximal thigh to calf, worse with ROM of thigh, since last night. Also notes some vaginal spotting last night. Denies fall/trauma, abdominal or pelvic pain, vomiting, diarrhea, CP, SOB, fever, chills, and all other symptoms. Review of EMR shows pt was here 5 days ago with L buttock pain. On exam, afebrile, hemodynamically stable, saturating well, NAD, appears uncomfortable, no WOB, head NCAT, EOMI grossly, anicteric, MMM, no JVD, RRR, nml S1/S2, no m/r/g, lungs CTAB, no w/r/r, abd soft, NT, ND, nml BS, no rebound or guarding, AAO, CN's 3-12 grossly intact, no extrem deformity/stepoff, noted increased L calf circumference, no pitting edema, nml color/sensation, skin warm, well perfused, no rashes or hives. Concern for DVT confirmed on US. No e/o limb ischemia or compartment syndrome. Also noted concern for threatened miscarriage. Cleared by OB. Admitted to vascular surgery and started heparin gtt.

## 2021-09-17 NOTE — CONSULT NOTE ADULT - SUBJECTIVE AND OBJECTIVE BOX
31yo  @6w0d IVF by frozen embryo transfer on , presents to ED with complaints of left leg pain and tightness since midnight. Patient reports pain is cramping in nature, intermittent, 7/10 intensity, associated with tightness, with no relieving or worsening factors, progressively worsening. Denies blurry vision, CP, SOB, rash. Patient is also experiencing light vaginal spotting since yesterday. Denies heavy vaginal bleeding, palpitations, weakness, lightheadedness. She has been taking estradiol, progesterone, and prednisone daily as prescribed by her NICOLASA. Previously seen in ED 5 days ago for vaginal spotting, hcg at that time was 2454, TVUS showed no IUP. Today hcg is 1507, and no IUP visualized. This is now considered a missed .     Ob/Gyn History:   - s/p IVF with frozen embryo transfer on , now missed                        Patient reports h/o fibroids, s/p open myomectomy in 2019.   Denies history of ovarian cysts, abnormal paps, or STIs    Denies the following: constitutional symptoms, visual symptoms, cardiovascular symptoms, respiratory symptoms, GI symptoms, musculoskeletal symptoms, skin symptoms, neurologic symptoms, hematologic symptoms, allergic symptoms, psychiatric symptoms  Except any pertinent positives listed.     Home Medications:  prenatal vitamin: 1 tab(s) orally once a day (2021 07:10)  Estradiol/Progesterone/Prednisone    No Known Allergies    PAST MEDICAL & SURGICAL HISTORY:  Fibroids  S/P dilation and curettage  S/P open myomectomy - 2019    FAMILY HISTORY:  FH: hypertension  Denies h/o coagulation disorders, stroke, MIs, DVTs        SOCIAL HISTORY: Denies cigarette use, alcohol use, or illicit drug use    PHYSICAL EXAM:   Vital Signs Last 24 Hrs  T(F): 96.8 (17 Sep 2021 05:47), Max: 96.8 (17 Sep 2021 05:47)  HR: 100 (17 Sep 2021 05:47) (100 - 100)  BP: 122/88 (17 Sep 2021 05:47) (122/88 - 122/88)  RR: 19 (17 Sep 2021 05:47) (19 - 19)    General Appearance - AAOx3, NAD  Heart - S1S2 regular rate and rhythm  Lung - CTA Bilaterally  Abdomen - Soft, nontender, nondistended, no rebound, no rigidity, no guarding, bowel sounds present  External genitalia - normal female  Vaginal mucosa - normal, no lesions/masses  Cervix - 1cm dilated, minimal extravasation from os, bright red blood  Uterus/Adnexa - limited due to body habitus and patient's limited motility  Ext - normal color and capillary refill on right calf, nontender right calf,       LABS:                        10.8   7.36  )-----------( 175      ( 17 Sep 2021 07:35 )             33.5     HCG Quantitative, Serum: 1507.0 mIU/mL (21 @ 07:35)  HCG Quantitative, Serum: 2454.0 mIU/mL (21 @ 20:58)    ABO RH Interpretation: A POS (21 @ 07:35)  Antibody Screen: NEG (21 @ 07:35)        132<L>  |  97<L>  |  6<L>  ----------------------------<  80  4.1   |  22  |  0.6<L>    Ca    8.9      17 Sep 2021 07:35  Mg     2.3         TPro  7.3  /  Alb  4.4  /  TBili  0.4  /  DBili  x   /  AST  18  /  ALT  11  /  AlkPhos  60      PT/INR - ( 17 Sep 2021 07:35 )   PT: 12.70 sec;   INR: 1.11 ratio         PTT - ( 17 Sep 2021 07:35 )  PTT:29.6 sec    Urinalysis Basic - ( 17 Sep 2021 08:15 )  Color: Yellow / Appearance: Clear / S.024 / pH: x  Gluc: x / Ketone: Small  / Bili: Negative / Urobili: <2 mg/dL   Blood: x / Protein: 30 mg/dL / Nitrite: Negative   Leuk Esterase: Negative / RBC: 74 /HPF / WBC 3 /HPF   Sq Epi: x / Non Sq Epi: 2 /HPF / Bacteria: Negative          RADIOLOGY & ADDITIONAL STUDIES:  < from: US Transvaginal (21 @ 09:50) >  EXAM:  US TRANSVAGINAL      PROCEDURE DATE:  2021    INTERPRETATION:  CLINICAL INFORMATION: Vaginal bleeding.  LMP: 2021  COMPARISON: 2021  TECHNIQUE:  Transabdominal pelvic sonogram only. Color and Spectral Doppler was performed.  FINDINGS:  Uterus: 16.2 cm x 11.2 cm x 16.1 cm. Diffusely enlarged with multiple fibroids.  Endometrium: . Within normal limits.  Right ovary: 3.2 cm x 3.2 cm x 3.0 cm. Within normal limits.  Left ovary: 3.2 cm x 2.3 cm x 2.0 cm. Within normal limits.  Fluid: None.  IMPRESSION:  Fibroid uterus.  No gestational sac identified.      < from: US Pelvis Complete (US Pelvis Complete .) (21 @ 23:01) >  EXAM:  US PELVIC COMPLETE        PROCEDURE DATE:  2021    INTERPRETATION:  CLINICAL HISTORY: IVF, vaginal bleeding  COMPARISON: Ultrasound dated 2018 and MRI of the pelvis dated 2018  PROCEDURE: Transabdominal ultrasound of the pelvis was performed, including Doppler.  LMP: 2021  FINDINGS:  UTERUS: Anteverted measuring 19.1 x 11.7 x 15.6 cm, with multiple intramural fibroids measuring up to 6 cm. The endometrial echo complex measures 1.1 cm, which is normal in thickness.  RIGHT OVARY: measures 1.6 x 3.7 x 1.8 cm, and is unremarkable. Normal Doppler waveforms are demonstrated to the right ovary.  LEFT OVARY: measures 3.1 x 1.6 x 1.6 cm, and is unremarkable. Normal Doppler waveforms are demonstrated in the left ovary.  OTHER: No free fluid in the pelvis.  IMPRESSION:  No evidence of intrauterine pregnancy on this transabdominal exam  No evidence of free fluid or definite adnexal mass  Multiple uterine fibroids are predominantly intramural       29yo  @6w0d IVF by frozen embryo transfer on , presents to ED with complaints of left leg pain and tightness since midnight. Patient reports pain is cramping in nature, intermittent, 7/10 intensity, associated with tightness, with no relieving or worsening factors, progressively worsening. Denies blurry vision, CP, SOB, rash. Patient is also experiencing light vaginal spotting since yesterday. Denies heavy vaginal bleeding, palpitations, weakness, lightheadedness. She has been taking estradiol, progesterone, and prednisone daily as prescribed by her NICOLASA. Previously seen in ED 5 days ago for vaginal spotting, hcg at that time was 2454, TVUS showed no IUP. Today hcg is 1507, and no IUP visualized. This is now considered a missed .     Ob/Gyn History:   - s/p IVF with frozen embryo transfer on , now missed                        Patient reports h/o fibroids, s/p open myomectomy in 2019.   Denies history of ovarian cysts, abnormal paps, or STIs    Denies the following: constitutional symptoms, visual symptoms, cardiovascular symptoms, respiratory symptoms, GI symptoms, musculoskeletal symptoms, skin symptoms, neurologic symptoms, hematologic symptoms, allergic symptoms, psychiatric symptoms  Except any pertinent positives listed.     Home Medications:  prenatal vitamin: 1 tab(s) orally once a day (2021 07:10)  Estradiol/Progesterone/Prednisone    No Known Allergies    PAST MEDICAL & SURGICAL HISTORY:  Fibroids  S/P dilation and curettage  S/P open myomectomy - 2019    FAMILY HISTORY:  FH: hypertension  Denies h/o coagulation disorders, stroke, MIs, DVTs        SOCIAL HISTORY: Denies cigarette use, alcohol use, or illicit drug use    PHYSICAL EXAM:   Vital Signs Last 24 Hrs  T(F): 96.8 (17 Sep 2021 05:47), Max: 96.8 (17 Sep 2021 05:47)  HR: 100 (17 Sep 2021 05:47) (100 - 100)  BP: 122/88 (17 Sep 2021 05:47) (122/88 - 122/88)  RR: 19 (17 Sep 2021 05:47) (19 - 19)    General Appearance - AAOx3, NAD  Heart - S1S2 regular rate and rhythm  Lung - CTA Bilaterally  Abdomen - Soft, nontender, nondistended, no rebound, no rigidity, no guarding, bowel sounds present  External genitalia - normal female  Vaginal mucosa - normal, no lesions/masses  Cervix - 1cm dilated, minimal extravasation from os, bright red blood  Uterus/Adnexa - limited due to body habitus and patient's limited motility  Ext - normal color on right calf, nontender right calf, swelling of left calf, notably larger than right leg, erythema noted on left leg       LABS:                        10.8   7.36  )-----------( 175      ( 17 Sep 2021 07:35 )             33.5     HCG Quantitative, Serum: 1507.0 mIU/mL (21 @ 07:35)  HCG Quantitative, Serum: 2454.0 mIU/mL (21 @ 20:58)    ABO RH Interpretation: A POS (21 @ 07:35)  Antibody Screen: NEG (21 @ 07:35)        132<L>  |  97<L>  |  6<L>  ----------------------------<  80  4.1   |  22  |  0.6<L>    Ca    8.9      17 Sep 2021 07:35  Mg     2.3         TPro  7.3  /  Alb  4.4  /  TBili  0.4  /  DBili  x   /  AST  18  /  ALT  11  /  AlkPhos  60      PT/INR - ( 17 Sep 2021 07:35 )   PT: 12.70 sec;   INR: 1.11 ratio         PTT - ( 17 Sep 2021 07:35 )  PTT:29.6 sec    Urinalysis Basic - ( 17 Sep 2021 08:15 )  Color: Yellow / Appearance: Clear / S.024 / pH: x  Gluc: x / Ketone: Small  / Bili: Negative / Urobili: <2 mg/dL   Blood: x / Protein: 30 mg/dL / Nitrite: Negative   Leuk Esterase: Negative / RBC: 74 /HPF / WBC 3 /HPF   Sq Epi: x / Non Sq Epi: 2 /HPF / Bacteria: Negative          RADIOLOGY & ADDITIONAL STUDIES:  < from: US Transvaginal (21 @ 09:50) >  EXAM:  US TRANSVAGINAL      PROCEDURE DATE:  2021    INTERPRETATION:  CLINICAL INFORMATION: Vaginal bleeding.  LMP: 2021  COMPARISON: 2021  TECHNIQUE:  Transabdominal pelvic sonogram only. Color and Spectral Doppler was performed.  FINDINGS:  Uterus: 16.2 cm x 11.2 cm x 16.1 cm. Diffusely enlarged with multiple fibroids.  Endometrium: . Within normal limits.  Right ovary: 3.2 cm x 3.2 cm x 3.0 cm. Within normal limits.  Left ovary: 3.2 cm x 2.3 cm x 2.0 cm. Within normal limits.  Fluid: None.  IMPRESSION:  Fibroid uterus.  No gestational sac identified.      < from: US Pelvis Complete (US Pelvis Complete .) (21 @ 23:01) >  EXAM:  US PELVIC COMPLETE        PROCEDURE DATE:  2021    INTERPRETATION:  CLINICAL HISTORY: IVF, vaginal bleeding  COMPARISON: Ultrasound dated 2018 and MRI of the pelvis dated 2018  PROCEDURE: Transabdominal ultrasound of the pelvis was performed, including Doppler.  LMP: 2021  FINDINGS:  UTERUS: Anteverted measuring 19.1 x 11.7 x 15.6 cm, with multiple intramural fibroids measuring up to 6 cm. The endometrial echo complex measures 1.1 cm, which is normal in thickness.  RIGHT OVARY: measures 1.6 x 3.7 x 1.8 cm, and is unremarkable. Normal Doppler waveforms are demonstrated to the right ovary.  LEFT OVARY: measures 3.1 x 1.6 x 1.6 cm, and is unremarkable. Normal Doppler waveforms are demonstrated in the left ovary.  OTHER: No free fluid in the pelvis.  IMPRESSION:  No evidence of intrauterine pregnancy on this transabdominal exam  No evidence of free fluid or definite adnexal mass  Multiple uterine fibroids are predominantly intramural

## 2021-09-17 NOTE — ED ADULT TRIAGE NOTE - CHIEF COMPLAINT QUOTE
" I have pain and swelling of my left thigh down my legs and I have vag bleeding yesterday." 3 weeks pregnant

## 2021-09-17 NOTE — CONSULT NOTE ADULT - ASSESSMENT
31yo  @6w0d, IVF pregnancy, on hormonal supplements, now with extensive left extremity DVT, missed , no concern for heavy vaginal bleeding, currently hemodynamically stable    -In light of HCG drop and US findings of no IUP, patient counseled on missed , instructed to call Dr. Angela's office for a follow-up in 2 weeks.   -Patient should discontinue all hormones prescribed for pregnancy  -recommend vascular evaluation ASAP - assessment and plan should now consider patient no longer pregnant, and should treat accordingly. Minimal concern for vaginal hemorrhage, in case there is a concern, please call [phone] (GYN extension)  -dispo per ED    Discussed with Dr. Umaña 31yo  @6w0d, IVF pregnancy, on hormonal supplements, now with extensive left extremity DVT, missed , no concern for heavy vaginal bleeding, currently hemodynamically stable    -In light of HCG drop and US findings of no IUP, patient counseled on missed , instructed to call Dr. Angela's office for a follow-up in 2 weeks.   -Patient should discontinue all hormones prescribed for pregnancy  -recommend vascular evaluation ASAP - assessment and plan should now consider patient no longer pregnant, and should treat accordingly. Minimal concern for vaginal hemorrhage, in case there is a concern, please call [phone] (GYN extension)  -dispo per ED    Discussed with Dr. Umaña

## 2021-09-17 NOTE — CHART NOTE - NSCHARTNOTEFT_GEN_A_CORE
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        ____  Intubated  TV:______       Rate: ______      FiO2: ______    __x__  Patent Airway    ___x_  Full return of protective reflexes    _x___  Full recovery from anesthesia / back to baseline     Vitals:   T: 97.6F          R:  18                BP:   121/81               Sat:   100                P: 94      Mental Status:  __x__ Awake   __x___ Alert   _____ Drowsy   _____ Sedated    Nausea/Vomiting:  __x__ NO  ______Yes,   See Post - Op Orders          Pain Scale (0-10):  __0/10___    Treatment: ____ None    __x__ See Post - Op/PCA Orders    Post - Operative Fluids:   ____ Oral   __x__ See Post - Op Orders    Plan: Discharge:   ____Home       ___x__Floor     _____Critical Care    _____  Other:_________________    Comments: Pt tolerated procedure well, no anesthesia related complications. Care of pt endorsed to PACU, report given to PACU RN. Discharge when criteria are met.

## 2021-09-17 NOTE — ED PROVIDER NOTE - CARE PLAN
Principal Discharge DX:	Deep vein thrombosis (DVT)   1 Principal Discharge DX:	Deep vein thrombosis (DVT)  Secondary Diagnosis:	Threatened miscarriage

## 2021-09-17 NOTE — ED PROVIDER NOTE - OBJECTIVE STATEMENT
30F  PMHx uterine fibroids, currently on IVF for pregnancy (on estradione, progesterone, prednisone) reportedly 3 weeks pregnant presents for severe LLE swelling and pain. Reports severe pain located at L inguinal crease that started at midnight last night radiating down her leg to her toes. Described pain as "feels like there's no blood flow". Now unable to ambulate/move her leg. Pt also reports vaginal bleeding last night, few drops of blood in underwear. Denies bleeding today in ED.   No chest pain, sob, fever/chills, cough, abd pain, n/v/d. Pt was seen in ED on  for buttock pain, seen by OBGYN at the time with HCG measured, discharged for f/u. TVUS at the time showed no IUP. Follows with Dr. Cortes (OBGYN and [facility]).

## 2021-09-17 NOTE — ED ADULT NURSE REASSESSMENT NOTE - NS ED NURSE REASSESS COMMENT FT1
Patient A&Ox4, c/o left leg pain and swelling that started midnight of last night. On assessment swelling present to LLE, + pulses present in LLE. Patient states she is 3 weeks pregnant and is unable to move LLE at this time. MD at bedside assessing patient. Denies nausea/ vomiting/fever/ chills/ SOB/ CP. IV placed, no s/s of distress noted at this time. Will continue to monitor.

## 2021-09-17 NOTE — BRIEF OPERATIVE NOTE - OPERATION/FINDINGS
1) Venogram, Inari Clotriever thrombectomy  2) IVUS   3) Flotriever deployment in infrarenal IVC w/ aspiration thrombectomy  4) 2x Medtronic Abre 6 x 60mm self-expanding stents  5) Balloon Angioplasty with 12 x 40mm & 18 x 40mm balloons 1) Venogram, Inari Clotriever thrombectomy  2) IVUS   3) Flotriever deployment in infrarenal IVC w/ aspiration thrombectomy  4) 2x Medtronic Abre 6 x 60mm self-expanding stents deployed in left common/external iliac veins  5) Balloon Angioplasty with 12 x 40mm & 18 x 40mm balloons

## 2021-09-17 NOTE — ED PROVIDER NOTE - NS ED ROS FT
Constitutional: (-) diaphoresis  Eyes/ENT: (-) runny nose  Cardiovascular: (-) chest pain  Respiratory: (-) cough  Gastrointestinal: (-) vomiting, (-) diarrhea  : +vaginal bleeding see HPI  Musculoskeletal: see HPI  Integumentary: (-) rash  Neurological: (-) LOC  Allergic/Immunologic: (-) pruritus

## 2021-09-17 NOTE — H&P ADULT - NSHPPHYSICALEXAM_GEN_ALL_CORE
CONSTITUTIONAL: uncomfortable appearing female in stretcher, tearful, NAD  SKIN: Warm dry, normal skin turgor  HEAD: NCAT  EYES: EOMI, no scleral icterus  ENT: Moist mucous membranes, normal pharynx with no erythema or exudates  NECK: Supple; non tender. Full ROM.   CARD: RRR, no murmurs, rubs or gallops  RESP: clear to ausculation b/l.  No rales, rhonchi, or wheezing.  ABD: soft, non-tender, non-distended, no rebound or guarding.   EXT: +swelling of LLE, notably larger than RLE from thigh down, +ttp, erythema, compartments soft, DP pulse 1+ on left, 2+ on right.   NEURO: alert and oriented, sensation intact and equal. unable to move LLE.  PSYCH: Cooperative, appropriate.

## 2021-09-17 NOTE — BRIEF OPERATIVE NOTE - NSICDXBRIEFPROCEDURE_GEN_ALL_CORE_FT
PROCEDURES:  Endovascular mechanical thrombectomy of vein 17-Sep-2021 16:40:10 iliofemoral thrombectomy using Jonah Mirza

## 2021-09-17 NOTE — BRIEF OPERATIVE NOTE - COMMENTS
Continue heparin, elevate leg
You can access the FollowMyHealth Patient Portal offered by [facility] by registering at the following website: [email or web address]. By joining [facility]’s FollowMyHealth portal, you will also be able to view your health information using other applications (apps) compatible with our system.

## 2021-09-17 NOTE — ED ADULT NURSE REASSESSMENT NOTE - PAIN: RESPONSE TO INTERVENTIONS
Emerson Cabello is a 82 year old year old male patient here today in consultation for brown spots on trunk by [facility].  He was stationed in [place] near [place] and notes radiation exposure.  He has a hx of basal cell carcinoma.  Patient states this has been present for years.  Location back and scalp.  Patient reports the following symptoms:  itching .  Patient reports the following previous treatments none.  Patient reports the following modifying factors none.  Associated symptoms: none.  Patient has no other skin complaints today.  Remainder of the HPI, Meds, PMH, Allergies, FH, and SH was reviewed in chart.    Pertinent Hx:   Non-melanoma skin cancer   Past Medical History   Diagnosis Date     Basal cell carcinoma        Past Surgical History   Procedure Laterality Date     No history of surgery          History reviewed. No pertinent family history.    Social History     Social History     Marital status: Significant other     Spouse name: N/A     Number of children: N/A     Years of education: N/A     Occupational History     Not on file.     Social History Main Topics     Smoking status: Former Smoker     Quit date: 1/1/1983     Smokeless tobacco: Not on file     Alcohol use Not on file     Drug use: Not on file     Sexual activity: Not on file     Other Topics Concern     Not on file     Social History Narrative       Outpatient Encounter Prescriptions as of 2/28/2017   Medication Sig Dispense Refill     TAMSULOSIN HCL PO        Glucosamine-Chondroitin (GLUCOSAMINE CHONDR COMPLEX PO) Take  by mouth.       hydrochlorothiazide (HYDRODIURIL) 25 MG tablet Take 25 mg by mouth daily.       lisinopril (PRINIVIL,ZESTRIL) 40 MG tablet Take 40 mg by mouth daily.       aspirin 81 MG EC tablet Take 81 mg by mouth daily.       Calcium Carbonate (CALCIUM 500 PO) Take  by mouth.       fexofenadine (ALLEGRA) 60 MG tablet Take 60 mg by mouth 2 times daily.       Multiple Vitamins-Minerals (MULTIVITAMIN & MINERAL PO) Take  by  mouth.       bupivacaine 0.375%, lidocaine 1%, EPINEPHrine 1:200,000 injection Inject 3 mLs Subcutaneous once for 1 dose. 3 mL 0     FOLIC ACID Reported on 2/28/2017       atenolol (TENORMIN) 50 MG tablet Take 50 mg by mouth daily Reported on 2/28/2017       No facility-administered encounter medications on file as of 2/28/2017.              Review Of Systems  Skin: As above  Eyes: negative  Ears/Nose/Throat: negative  Respiratory: No shortness of breath, dyspnea on exertion, cough, or hemoptysis  Cardiovascular: negative  Gastrointestinal: negative  Genitourinary: negative  Musculoskeletal: negative  Neurologic: negative  Psychiatric: negative  Hematologic/Lymphatic/Immunologic: negative  Endocrine: negative      O:   NAD, WDWN, Alert & Oriented, Mood & Affect wnl, Vitals stable   Here today with wife    /70  Pulse 58  SpO2 97%   General appearance joel ii   Vitals stable   Alert, oriented and in no acute distress      Following lymph nodes palpated: Occipital, Cervical, Supraclavicular no lad   Stuck on papules and brown macules on trunk and ext    Red papules on trunk   Inflamed seborrheic keratosis on back and scalp         The remainder of the full exam was unremarkable; the following areas were examined:  conjunctiva/lids, oral mucosa, neck, peripheral vascular system, abdomen, lymph nodes, digits/nails, eccrine and apocrine glands, scalp/hair, face, neck, chest, abdomen, buttocks, back, RUE, LUE, RLE, LLE       Eyes: Conjunctivae/lids:Normal     ENT: Lips, buccal mucosa, tongue: normal    MSK:Normal    Cardiovascular: peripheral edema none    Pulm: Breathing Normal    Lymph Nodes: No Head and Neck Lymphadenopathy     Neuro/Psych: Orientation:Normal; Mood/Affect:Normal      A/P:  1. Radiation exposure risks of non-melanoma skin cancer discussed with patient   2. Seborrheic keratosis, lentigo, angioma  3. Inflamed seborrheic keratosis  Back x6  LN2:  Treated with LN2 for 5s for 1-2 cycles. Warned risks of  blistering, pain, pigment change, scarring, and incomplete resolution.  Advised patient to return if lesions do not completely resolve.  Wound care sheet given.  R parietal scalp x1  LN2:  Treated with LN2 for 5s for 1-2 cycles. Warned risks of blistering, pain, pigment change, scarring, and incomplete resolution.  Advised patient to return if lesions do not completely resolve.  Wound care sheet given.   BENIGN LESIONS DISCUSSED WITH PATIENT:  I discussed the specifics of tumor, prognosis, and genetics of benign lesions.  I explained that treatment of these lesions would be purely cosmetic and not medically neccessary.  I discussed with patient different removal options including excision, cautery and /or laser.      Nature and genetics of benign skin lesions dicussed with patient.  Signs and Symptoms of skin cancer discussed with patient.  ABCDEs of melanoma reviewed with patient.  Patient encouraged to perform monthly skin exams.  UV precautions reviewed with patient.  Skin care regimen reviewed with patient: Eliminate harsh soaps, i.e. Dial, zest, irsih spring; Mild soaps such as Cetaphil or Dove sensitive skin, avoid hot or cold showers, aggressive use of emollients including vanicream, cetaphil or cerave discussed with patient.    Risks of non-melanoma skin cancer discussed with patient   Return to clinic 6-1 2months     pain rating (specify)

## 2021-09-17 NOTE — BRIEF OPERATIVE NOTE - NSICDXBRIEFPOSTOP_GEN_ALL_CORE_FT
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

## 2021-09-17 NOTE — H&P ADULT - HISTORY OF PRESENT ILLNESS
VASCULAR SURGERY CONSULT NOTE      HPI:      30F  PMHx uterine fibroids, currently on IVF for pregnancy (on estradione, progesterone, prednisone) reportedly 3 weeks pregnant presents for severe LLE swelling and pain. Reports severe pain located at L inguinal crease that started at midnight last night radiating down her leg to her toes. Described pain as "feels like there's no blood flow". Now unable to ambulate/move her leg. Pt also reports vaginal bleeding last night, few drops of blood in underwear. Denies bleeding today in ED.   No chest pain, sob, fever/chills, cough, abd pain, n/v/d. Pt was seen in ED on  for buttock pain, seen by OBGYN at the time with HCG measured, discharged for f/u. TVUS at the time showed no IUP. Follows with Dr. Cortes.     Vascular surgery was consulted for extensive LLE DVT on duplex. Patient is a 31 y/o female with PMHx of fibroids and myomectomy in 2019 who was 3 weeks pregnant via IVF s/p missed . Patient presented to ED with LLE pain that started at midnight. Patient states that pain started at groin and progressed down leg all the way to calf. Currently pain is localized in calf and patient is unable to walk. Patient reports the LLE feeling heavy and tight compared to RLE and warmer. Patient has no history of clots and is not on any AC at home. Patient denies any SOB, changes in vision, parasthesia, or neuropathy.     PAST MEDICAL & SURGICAL HISTORY:  Fibroids    S/P dilation and curettage      No Known Allergies    Home Medications:  prenatal vitamin: 1 tab(s) orally once a day (2021 07:10)    No permtinent family history of PVD    REVIEW OF SYSTEMS:  GENERAL:                                         negative  SKIN:                                                 negative  OPTHALMOLOGIC:                          negative  ENMT:                                               negative  RESPIRATORY AND THORAX:        negative  CARDIOVASCULAR:                         negative  GASTROINTESTINAL:                       negative  NEPHROLOGY:                                  negative  MUSCULOSKELETAL:                       negative  NEUROLOGIC:                                   negative  PSYCHIATRIC:                                    negative  HEMATOLOGY/LYMPHATICS:         negative  ENDOCRINE:                                     negative  ALLERGIC/IMMUNOLOGIC:            negative    12 point ROS otherwise normal except as stated in HPI    PHYSICAL EXAM  Vital Signs Last 24 Hrs  T(C): 36 (17 Sep 2021 05:47), Max: 36 (17 Sep 2021 05:47)  T(F): 96.8 (17 Sep 2021 05:47), Max: 96.8 (17 Sep 2021 05:47)  HR: 100 (17 Sep 2021 05:47) (100 - 100)  BP: 122/88 (17 Sep 2021 05:47) (122/88 - 122/88)  BP(mean): --  RR: 19 (17 Sep 2021 05:47) (19 - 19)  SpO2: 99% (17 Sep 2021 05:47) (99% - 99%)    Appearance: Normal	  HEENT:   Normal oral mucosa, PERRL, EOMI	  Neck: Supple, - JVD; Carotid Bruit   Cardiovascular: Normal S1 S2, No JVD, No murmurs,   Respiratory: Lungs clear to auscultation, No Rales, Rhonchi, Wheezing	  Gastrointestinal:  Soft, Non-tender, positive BS	  Skin: No rashes, No ecchymoses, No cyanosis  Extremities: Normal range of motion, No clubbing, cyanosis or edema  Vascular: Peripheral pulses palpable 2+ bilaterally  Neurologic: Non-focal  Psychiatry: A & O x 3, Mood & affect appropriate      PULSES:  Femoral:  Popliteal:  Dorsal Pedal:  Posterior Tibial:  Capillary:    MEDICATIONS:   MEDICATIONS  (STANDING):    MEDICATIONS  (PRN):      LAB/STUDIES:                        10.8   7.36  )-----------( 175      ( 17 Sep 2021 07:35 )             33.5         132<L>  |  97<L>  |  6<L>  ----------------------------<  80  4.1   |  22  |  0.6<L>    Ca    8.9      17 Sep 2021 07:35  Mg     2.3     -    TPro  7.3  /  Alb  4.4  /  TBili  0.4  /  DBili  x   /  AST  18  /  ALT  11  /  AlkPhos  60  09-17    PT/INR - ( 17 Sep 2021 07:35 )   PT: 12.70 sec;   INR: 1.11 ratio         PTT - ( 17 Sep 2021 07:35 )  PTT:29.6 sec  LIVER FUNCTIONS - ( 17 Sep 2021 07:35 )  Alb: 4.4 g/dL / Pro: 7.3 g/dL / ALK PHOS: 60 U/L / ALT: 11 U/L / AST: 18 U/L / GGT: x             Urinalysis Basic - ( 17 Sep 2021 08:15 )    Color: Yellow / Appearance: Clear / S.024 / pH: x  Gluc: x / Ketone: Small  / Bili: Negative / Urobili: <2 mg/dL   Blood: x / Protein: 30 mg/dL / Nitrite: Negative   Leuk Esterase: Negative / RBC: 74 /HPF / WBC 3 /HPF   Sq Epi: x / Non Sq Epi: 2 /HPF / Bacteria: Negative                    IMAGING:      ASSESSMENT: Patient is a  old  with ****  Vascular surgery consulted for    PLAN:   -   -   -   -   - Patient seen/examined or Plan Discussed with Fellow,    - Plan to be discussed with Attending,        VASCULAR TEAM SPECTRA: [id number] VASCULAR SURGERY CONSULT NOTE      HPI:      30F  PMHx uterine fibroids, currently on IVF for pregnancy (on estradione, progesterone, prednisone) reportedly 3 weeks pregnant presents for severe LLE swelling and pain. Reports severe pain located at L inguinal crease that started at midnight last night radiating down her leg to her toes. Described pain as "feels like there's no blood flow". Now unable to ambulate/move her leg. Pt also reports vaginal bleeding last night, few drops of blood in underwear. Denies bleeding today in ED.   No chest pain, sob, fever/chills, cough, abd pain, n/v/d. Pt was seen in ED on  for buttock pain, seen by OBGYN at the time with HCG measured, discharged for f/u. TVUS at the time showed no IUP. Follows with Dr. Cortes.     Vascular surgery was consulted for extensive LLE DVT on duplex. Patient is a 29 y/o female with PMHx of fibroids and myomectomy in 2019 who was 3 weeks pregnant via IVF s/p missed . Patient presented to ED with LLE pain that started at midnight. Patient states that pain started at groin and progressed down leg all the way to calf. Currently pain is localized in calf and patient is unable to walk. Patient reports the LLE feeling heavy and tight compared to RLE and warmer. Patient has no history of clots and is not on any AC at home. Patient denies any SOB, changes in vision, parasthesia, or neuropathy.     PAST MEDICAL & SURGICAL HISTORY:  Fibroids    S/P dilation and curettage      No Known Allergies    Home Medications:  prenatal vitamin: 1 tab(s) orally once a day (2021 07:10)    No permtinent family history of PVD    REVIEW OF SYSTEMS:  GENERAL:                                         negative  SKIN:                                                 negative  OPTHALMOLOGIC:                          negative  ENMT:                                               negative  RESPIRATORY AND THORAX:        negative  CARDIOVASCULAR:                         negative  GASTROINTESTINAL:                       negative  NEPHROLOGY:                                  negative  MUSCULOSKELETAL:                       negative  NEUROLOGIC:                                   negative  PSYCHIATRIC:                                    negative  HEMATOLOGY/LYMPHATICS:         negative  ENDOCRINE:                                     negative  ALLERGIC/IMMUNOLOGIC:            negative    12 point ROS otherwise normal except as stated in HPI

## 2021-09-17 NOTE — ED PROVIDER NOTE - PROGRESS NOTE DETAILS
Harish: spoke to vascular tech [facility] - pt with extensive thrombosis of LLE.  Vascular surgery consulted ([id number]) - will evaluate pt Harish: spoke to vascular tech [facility] - pt with extensive thrombosis of LLE.  Vascular surgery consulted ([id number]) - will evaluate pt  OBGYN contacted on teams Harish: spoke to Dr. Arnold (OBGYN) - pt was examined. HCG decreased from 9/12 (2454 --> 1507 today). Pt no longer pregnant, cleared by OBGYN for any interventions by vascular. Pt to follow up with OBGYN/[facility] (Dr. Angela). Harish: spoke to vascular team - pt to be admitted under Dr. Smith. Heparin gtt to be started in ED. pt likely to have thrombectomy in OR today. COVID sent stat, informed lab to run rapid.

## 2021-09-17 NOTE — H&P ADULT - NSHPLABSRESULTS_GEN_ALL_CORE
< from: [facility] Duplex Low Ext Arterial, Ltd, Left (09.17.21 @ 09:47) >    Impression:    Normal arterial flow in the left lower extremities.    < end of copied text >    < from: [facility] Duplex Lower Ext Vein Scan, Bilat (09.17.21 @ 09:37) >    Impression:    Deep venous thrombosis noted in the left external iliac, common femoral, femoral, deep femoral, popliteal, posterior tibial, anterior tibial. Of note there is also thrombosis in the gastrocnemius vein and superficial thrombosis as well.    Right lower extremity has no deep venous thrombosis or superficial thrombosis    < end of copied text >

## 2021-09-17 NOTE — ED PROVIDER NOTE - CLINICAL SUMMARY MEDICAL DECISION MAKING FREE TEXT BOX
30yoF  at approx 3 wks' gestation, 2/2 IVF on  with Dr. Dominique (?[facility]), on estradione, progresterone, prednisone presents with severe pain in her L leg from proximal thigh to calf, worse with ROM of thigh, since last night. Also notes some vaginal spotting last night. Denies fall/trauma, abdominal or pelvic pain, vomiting, diarrhea, CP, SOB, fever, chills, and all other symptoms. Review of EMR shows pt was here 5 days ago with L buttock pain. On exam, afebrile, hemodynamically stable, saturating well, NAD, appears uncomfortable, no WOB, head NCAT, EOMI grossly, anicteric, MMM, no JVD, RRR, nml S1/S2, no m/r/g, lungs CTAB, no w/r/r, abd soft, NT, ND, nml BS, no rebound or guarding, AAO, CN's 3-12 grossly intact, no extrem deformity/stepoff, noted increased L calf circumference, no pitting edema, nml color/sensation, skin warm, well perfused, no rashes or hives. Concern for DVT confirmed on US. No e/o limb ischemia or compartment syndrome. Also noted concern for threatened miscarriage. Cleared by OB. Admitted to vascular surgery and started heparin gtt.

## 2021-09-17 NOTE — ED ADULT TRIAGE NOTE - AS HEIGHT TYPE
Patient Instructions by Luís Young PT at 7/16/2019  3:30 PM     Author: Luís Young PT Service: -- Author Type: Physical Therapist    Filed: 7/16/2019  4:21 PM Encounter Date: 7/16/2019 Status: Signed    : Luís Young PT (Physical Therapist)        LOWER TRUNK ROTATIONS - LTR    Lying on your back with your knees bent, gently move your knees side-to-side.    Hold 2 seconds. 10x each side.  2x/day.      SINGLE KNEE TO CHEST STRETCH - SKTC    While Lying on your back,  hold your knee and gently pull it up towards your chest.    Hold 30 seconds. 2x each leg.  2x/day.         HIP FLEXOR STRETCH 2    While lying on a table or high bed, let the affected leg lower towards the floor until a stretch is felt along the front of your thigh.     At the same time, grasp your opposite knee and pull it towards your chest.    Hold 30 seconds. 2x each leg.  2x/day.       Trial cane in the RIGHT hand when walking. Make sure not to lean on it; it is just meant to help offload some weight from the left leg and help normalize your walking.          stated

## 2021-09-17 NOTE — ED ADULT NURSE NOTE - OBJECTIVE STATEMENT
Pt is a 30y female with c/o of L LE swelling and pain. Pt states the L LE became swollen and painful at midnight. Pt also states she is 3 weeks pregnant. Left leg more swollen and red than right LE. B/L LE pulses dopplerable +2.

## 2021-09-18 LAB
ANION GAP SERPL CALC-SCNC: 13 MMOL/L — SIGNIFICANT CHANGE UP (ref 7–14)
ANION GAP SERPL CALC-SCNC: 8 MMOL/L — SIGNIFICANT CHANGE UP (ref 7–14)
APTT BLD: 33.5 SEC — SIGNIFICANT CHANGE UP (ref 27–39.2)
APTT BLD: 36.5 SEC — SIGNIFICANT CHANGE UP (ref 27–39.2)
APTT BLD: 70.4 SEC — CRITICAL HIGH (ref 27–39.2)
APTT BLD: 91.2 SEC — CRITICAL HIGH (ref 27–39.2)
BASE EXCESS BLDA CALC-SCNC: 0.3 MMOL/L — SIGNIFICANT CHANGE UP (ref -2–3)
BASOPHILS # BLD AUTO: 0.03 K/UL — SIGNIFICANT CHANGE UP (ref 0–0.2)
BASOPHILS # BLD AUTO: 0.04 K/UL — SIGNIFICANT CHANGE UP (ref 0–0.2)
BASOPHILS NFR BLD AUTO: 0.6 % — SIGNIFICANT CHANGE UP (ref 0–1)
BASOPHILS NFR BLD AUTO: 0.7 % — SIGNIFICANT CHANGE UP (ref 0–1)
BUN SERPL-MCNC: 5 MG/DL — LOW (ref 10–20)
BUN SERPL-MCNC: 5 MG/DL — LOW (ref 10–20)
CALCIUM SERPL-MCNC: 7.9 MG/DL — LOW (ref 8.5–10.1)
CALCIUM SERPL-MCNC: 8.1 MG/DL — LOW (ref 8.5–10.1)
CHLORIDE SERPL-SCNC: 107 MMOL/L — SIGNIFICANT CHANGE UP (ref 98–110)
CHLORIDE SERPL-SCNC: 108 MMOL/L — SIGNIFICANT CHANGE UP (ref 98–110)
CO2 SERPL-SCNC: 19 MMOL/L — SIGNIFICANT CHANGE UP (ref 17–32)
CO2 SERPL-SCNC: 22 MMOL/L — SIGNIFICANT CHANGE UP (ref 17–32)
COVID-19 SPIKE DOMAIN AB INTERP: POSITIVE
COVID-19 SPIKE DOMAIN ANTIBODY RESULT: >250 U/ML — HIGH
CREAT SERPL-MCNC: 0.5 MG/DL — LOW (ref 0.7–1.5)
CREAT SERPL-MCNC: 0.5 MG/DL — LOW (ref 0.7–1.5)
EOSINOPHIL # BLD AUTO: 0.09 K/UL — SIGNIFICANT CHANGE UP (ref 0–0.7)
EOSINOPHIL # BLD AUTO: 0.1 K/UL — SIGNIFICANT CHANGE UP (ref 0–0.7)
EOSINOPHIL NFR BLD AUTO: 1.7 % — SIGNIFICANT CHANGE UP (ref 0–8)
EOSINOPHIL NFR BLD AUTO: 1.7 % — SIGNIFICANT CHANGE UP (ref 0–8)
GLUCOSE SERPL-MCNC: 105 MG/DL — HIGH (ref 70–99)
GLUCOSE SERPL-MCNC: 92 MG/DL — SIGNIFICANT CHANGE UP (ref 70–99)
HCG SERPL-ACNC: 458.3 MIU/ML — HIGH
HCO3 BLDA-SCNC: 24 MMOL/L — SIGNIFICANT CHANGE UP (ref 21–28)
HCT VFR BLD CALC: 26.2 % — LOW (ref 37–47)
HCT VFR BLD CALC: 27 % — LOW (ref 37–47)
HCT VFR BLD CALC: 30.3 % — LOW (ref 37–47)
HGB BLD-MCNC: 8.4 G/DL — LOW (ref 12–16)
HGB BLD-MCNC: 8.8 G/DL — LOW (ref 12–16)
HGB BLD-MCNC: 9.7 G/DL — LOW (ref 12–16)
IMM GRANULOCYTES NFR BLD AUTO: 0.2 % — SIGNIFICANT CHANGE UP (ref 0.1–0.3)
IMM GRANULOCYTES NFR BLD AUTO: 0.3 % — SIGNIFICANT CHANGE UP (ref 0.1–0.3)
INR BLD: 1.13 RATIO — SIGNIFICANT CHANGE UP (ref 0.65–1.3)
INR BLD: 1.13 RATIO — SIGNIFICANT CHANGE UP (ref 0.65–1.3)
INR BLD: 1.16 RATIO — SIGNIFICANT CHANGE UP (ref 0.65–1.3)
INR BLD: 1.29 RATIO — SIGNIFICANT CHANGE UP (ref 0.65–1.3)
LYMPHOCYTES # BLD AUTO: 1.55 K/UL — SIGNIFICANT CHANGE UP (ref 1.2–3.4)
LYMPHOCYTES # BLD AUTO: 1.84 K/UL — SIGNIFICANT CHANGE UP (ref 1.2–3.4)
LYMPHOCYTES # BLD AUTO: 30 % — SIGNIFICANT CHANGE UP (ref 20.5–51.1)
LYMPHOCYTES # BLD AUTO: 31.3 % — SIGNIFICANT CHANGE UP (ref 20.5–51.1)
MAGNESIUM SERPL-MCNC: 2 MG/DL — SIGNIFICANT CHANGE UP (ref 1.8–2.4)
MAGNESIUM SERPL-MCNC: 2 MG/DL — SIGNIFICANT CHANGE UP (ref 1.8–2.4)
MCHC RBC-ENTMCNC: 26.1 PG — LOW (ref 27–31)
MCHC RBC-ENTMCNC: 26.4 PG — LOW (ref 27–31)
MCHC RBC-ENTMCNC: 26.7 PG — LOW (ref 27–31)
MCHC RBC-ENTMCNC: 32 G/DL — SIGNIFICANT CHANGE UP (ref 32–37)
MCHC RBC-ENTMCNC: 32.1 G/DL — SIGNIFICANT CHANGE UP (ref 32–37)
MCHC RBC-ENTMCNC: 32.6 G/DL — SIGNIFICANT CHANGE UP (ref 32–37)
MCV RBC AUTO: 81.7 FL — SIGNIFICANT CHANGE UP (ref 81–99)
MCV RBC AUTO: 82.1 FL — SIGNIFICANT CHANGE UP (ref 81–99)
MCV RBC AUTO: 82.4 FL — SIGNIFICANT CHANGE UP (ref 81–99)
MONOCYTES # BLD AUTO: 0.49 K/UL — SIGNIFICANT CHANGE UP (ref 0.1–0.6)
MONOCYTES # BLD AUTO: 0.51 K/UL — SIGNIFICANT CHANGE UP (ref 0.1–0.6)
MONOCYTES NFR BLD AUTO: 8.3 % — SIGNIFICANT CHANGE UP (ref 1.7–9.3)
MONOCYTES NFR BLD AUTO: 9.9 % — HIGH (ref 1.7–9.3)
NEUTROPHILS # BLD AUTO: 2.98 K/UL — SIGNIFICANT CHANGE UP (ref 1.4–6.5)
NEUTROPHILS # BLD AUTO: 3.39 K/UL — SIGNIFICANT CHANGE UP (ref 1.4–6.5)
NEUTROPHILS NFR BLD AUTO: 57.6 % — SIGNIFICANT CHANGE UP (ref 42.2–75.2)
NEUTROPHILS NFR BLD AUTO: 57.7 % — SIGNIFICANT CHANGE UP (ref 42.2–75.2)
NRBC # BLD: 0 /100 WBCS — SIGNIFICANT CHANGE UP (ref 0–0)
PCO2 BLDA: 33 MMHG — SIGNIFICANT CHANGE UP (ref 32–35)
PH BLDA: 7.46 — HIGH (ref 7.35–7.45)
PHOSPHATE SERPL-MCNC: 1.9 MG/DL — LOW (ref 2.1–4.9)
PHOSPHATE SERPL-MCNC: 2 MG/DL — LOW (ref 2.1–4.9)
PLATELET # BLD AUTO: 141 K/UL — SIGNIFICANT CHANGE UP (ref 130–400)
PLATELET # BLD AUTO: 150 K/UL — SIGNIFICANT CHANGE UP (ref 130–400)
PLATELET # BLD AUTO: 150 K/UL — SIGNIFICANT CHANGE UP (ref 130–400)
PO2 BLDA: 41 MMHG — CRITICAL LOW (ref 83–108)
POTASSIUM SERPL-MCNC: 3.6 MMOL/L — SIGNIFICANT CHANGE UP (ref 3.5–5)
POTASSIUM SERPL-MCNC: 3.9 MMOL/L — SIGNIFICANT CHANGE UP (ref 3.5–5)
POTASSIUM SERPL-SCNC: 3.6 MMOL/L — SIGNIFICANT CHANGE UP (ref 3.5–5)
POTASSIUM SERPL-SCNC: 3.9 MMOL/L — SIGNIFICANT CHANGE UP (ref 3.5–5)
PROTHROM AB SERPL-ACNC: 13 SEC — HIGH (ref 9.95–12.87)
PROTHROM AB SERPL-ACNC: 13 SEC — HIGH (ref 9.95–12.87)
PROTHROM AB SERPL-ACNC: 13.3 SEC — HIGH (ref 9.95–12.87)
PROTHROM AB SERPL-ACNC: 14.8 SEC — HIGH (ref 9.95–12.87)
RBC # BLD: 3.18 M/UL — LOW (ref 4.2–5.4)
RBC # BLD: 3.29 M/UL — LOW (ref 4.2–5.4)
RBC # BLD: 3.71 M/UL — LOW (ref 4.2–5.4)
RBC # FLD: 13.2 % — SIGNIFICANT CHANGE UP (ref 11.5–14.5)
RBC # FLD: 13.3 % — SIGNIFICANT CHANGE UP (ref 11.5–14.5)
RBC # FLD: 13.4 % — SIGNIFICANT CHANGE UP (ref 11.5–14.5)
SAO2 % BLDA: 75 % — LOW (ref 94–98)
SARS-COV-2 IGG+IGM SERPL QL IA: >250 U/ML — HIGH
SARS-COV-2 IGG+IGM SERPL QL IA: POSITIVE
SODIUM SERPL-SCNC: 138 MMOL/L — SIGNIFICANT CHANGE UP (ref 135–146)
SODIUM SERPL-SCNC: 139 MMOL/L — SIGNIFICANT CHANGE UP (ref 135–146)
TROPONIN T SERPL-MCNC: <0.01 NG/ML — SIGNIFICANT CHANGE UP
WBC # BLD: 5.17 K/UL — SIGNIFICANT CHANGE UP (ref 4.8–10.8)
WBC # BLD: 5.57 K/UL — SIGNIFICANT CHANGE UP (ref 4.8–10.8)
WBC # BLD: 5.88 K/UL — SIGNIFICANT CHANGE UP (ref 4.8–10.8)
WBC # FLD AUTO: 5.17 K/UL — SIGNIFICANT CHANGE UP (ref 4.8–10.8)
WBC # FLD AUTO: 5.57 K/UL — SIGNIFICANT CHANGE UP (ref 4.8–10.8)
WBC # FLD AUTO: 5.88 K/UL — SIGNIFICANT CHANGE UP (ref 4.8–10.8)

## 2021-09-18 PROCEDURE — 74177 CT ABD & PELVIS W/CONTRAST: CPT | Mod: 26

## 2021-09-18 PROCEDURE — 99232 SBSQ HOSP IP/OBS MODERATE 35: CPT

## 2021-09-18 PROCEDURE — 93010 ELECTROCARDIOGRAM REPORT: CPT

## 2021-09-18 PROCEDURE — 71275 CT ANGIOGRAPHY CHEST: CPT | Mod: 26

## 2021-09-18 RX ORDER — MORPHINE SULFATE 50 MG/1
2 CAPSULE, EXTENDED RELEASE ORAL EVERY 4 HOURS
Refills: 0 | Status: DISCONTINUED | OUTPATIENT
Start: 2021-09-18 | End: 2021-09-20

## 2021-09-18 RX ORDER — SODIUM CHLORIDE 9 MG/ML
1000 INJECTION INTRAMUSCULAR; INTRAVENOUS; SUBCUTANEOUS ONCE
Refills: 0 | Status: COMPLETED | OUTPATIENT
Start: 2021-09-18 | End: 2021-09-18

## 2021-09-18 RX ORDER — ACETAMINOPHEN 500 MG
1000 TABLET ORAL ONCE
Refills: 0 | Status: COMPLETED | OUTPATIENT
Start: 2021-09-18 | End: 2021-09-18

## 2021-09-18 RX ORDER — APIXABAN 2.5 MG/1
10 TABLET, FILM COATED ORAL EVERY 12 HOURS
Refills: 0 | Status: DISCONTINUED | OUTPATIENT
Start: 2021-09-18 | End: 2021-09-20

## 2021-09-18 RX ORDER — HEPARIN SODIUM 5000 [USP'U]/ML
1600 INJECTION INTRAVENOUS; SUBCUTANEOUS
Qty: 25000 | Refills: 0 | Status: DISCONTINUED | OUTPATIENT
Start: 2021-09-18 | End: 2021-09-18

## 2021-09-18 RX ADMIN — OXYCODONE HYDROCHLORIDE 5 MILLIGRAM(S): 5 TABLET ORAL at 16:45

## 2021-09-18 RX ADMIN — APIXABAN 10 MILLIGRAM(S): 2.5 TABLET, FILM COATED ORAL at 12:45

## 2021-09-18 RX ADMIN — APIXABAN 10 MILLIGRAM(S): 2.5 TABLET, FILM COATED ORAL at 18:48

## 2021-09-18 RX ADMIN — OXYCODONE HYDROCHLORIDE 5 MILLIGRAM(S): 5 TABLET ORAL at 09:07

## 2021-09-18 RX ADMIN — SODIUM CHLORIDE 1000 MILLILITER(S): 9 INJECTION INTRAMUSCULAR; INTRAVENOUS; SUBCUTANEOUS at 09:39

## 2021-09-18 RX ADMIN — HEPARIN SODIUM 16 UNIT(S)/HR: 5000 INJECTION INTRAVENOUS; SUBCUTANEOUS at 04:04

## 2021-09-18 RX ADMIN — OXYCODONE HYDROCHLORIDE 5 MILLIGRAM(S): 5 TABLET ORAL at 15:41

## 2021-09-18 RX ADMIN — MORPHINE SULFATE 2 MILLIGRAM(S): 50 CAPSULE, EXTENDED RELEASE ORAL at 12:06

## 2021-09-18 RX ADMIN — Medication 400 MILLIGRAM(S): at 09:10

## 2021-09-18 RX ADMIN — MORPHINE SULFATE 2 MILLIGRAM(S): 50 CAPSULE, EXTENDED RELEASE ORAL at 12:46

## 2021-09-18 RX ADMIN — SODIUM CHLORIDE 1000 MILLILITER(S): 9 INJECTION INTRAMUSCULAR; INTRAVENOUS; SUBCUTANEOUS at 12:23

## 2021-09-18 RX ADMIN — OXYCODONE HYDROCHLORIDE 5 MILLIGRAM(S): 5 TABLET ORAL at 03:47

## 2021-09-18 RX ADMIN — OXYCODONE HYDROCHLORIDE 5 MILLIGRAM(S): 5 TABLET ORAL at 10:07

## 2021-09-18 RX ADMIN — Medication 1000 MILLIGRAM(S): at 09:40

## 2021-09-18 NOTE — CONSULT NOTE ADULT - ASSESSMENT
early pregnany from fertility treatment miscarriage but extensive blood clot in left leg and fibroids compressing multiple vessels including vena cava and now PE    1. SAB - i exaplined to patient hcg dropping and likely SAb wont require any treatment. dr Markham will follow hcg down to zero and make sure  2. clotting - likely combination of pregnancy, fibroids and fertlity treatment - will need a.c by vascular and required emergency thrombectomy and stenting but could not place filter due to vena cava compression; CT for PE also showed large fibroid uterus that appears to be compressing these vascular structures and they are afraid of more and large clots/emboli in future and afraid that a/c wont be able to be stopped while fibroids are there  3. fibroids - issue is that myomectomy could have extensive blood loss of 1-4 liters. that may not be controllable and a/c will increase bleeding. also she is jehovah witness and declines and blood products and i spoke to her in private with her nurse present and she confirms that she would rather be allowed to die rather than receive blood transfusion. so laparotomy myomectomy could have mortality approaching 100% if bleeding occurs. i advised hysterectomy is likely a much safer procedure with lower bleeding risk and lower mortality  I explained everything to her in detail. she will follow up with dr markham this friday at 10 AM in office - appt made for her

## 2021-09-18 NOTE — PROVIDER CONTACT NOTE (MEDICATION) - SITUATION
Called to clarify if 18:00 Eliquis is to be given at scheduled time since I recently administered 10mg initial dose at 12:45 pm.

## 2021-09-18 NOTE — PROVIDER CONTACT NOTE (CRITICAL VALUE NOTIFICATION) - SITUATION
Notified Vascular MD Huffman of vital signs, pt remains tachycardic heart rate 120-127
Vascular MD Huffman [phone] notified of repeat vital signs.
MD Gifford [phone] notified of vital signs including BP: 113/60 P:120 T:99.6 o2:95% on room air R:20
MD Gifford [phone] notified of c/o pain in addition to vital signs including BP:97/55 P:112 o2:95% on room air, T:98.2

## 2021-09-18 NOTE — PROVIDER CONTACT NOTE (CHANGE IN STATUS NOTIFICATION) - ACTION/TREATMENT ORDERED:
Awaiting MD assessment at this time. Currently no order for Tylenol. Will provide cool packs and reassess. Will attempt to contact MD again for Tylenol order and clarify need for EKG.

## 2021-09-18 NOTE — PROVIDER CONTACT NOTE (CRITICAL VALUE NOTIFICATION) - NAME OF MD/NP/PA/DO NOTIFIED:
MD Gifford [phone] notified
MD Gifford [phone] notified
Vascular MD Huffman [phone] notified
Vascular MD Huffman [phone]

## 2021-09-18 NOTE — PROVIDER CONTACT NOTE (CHANGE IN STATUS NOTIFICATION) - SITUATION
Notified Vascular MD Hendrix [phone] of patients complaints of pain in addition to vital signs including T:101.4 BP:116/67 P:111-120 o2:98% on room air.

## 2021-09-18 NOTE — PROVIDER CONTACT NOTE (CRITICAL VALUE NOTIFICATION) - ASSESSMENT
Patient assessed, pt continues to complain of abdominal pain and lower back pain.   2mg Morphine IV administered at this time.   Heparin gtt discontinued at this time per vascular team. Patient assessed, pt continues to complain of abdominal pain and lower back pain.   Pt has no c/o chest pain, No c/o shortness of breath.  2mg Morphine IV administered at this time.   Heparin gtt discontinued at this time per vascular team.

## 2021-09-18 NOTE — PROVIDER CONTACT NOTE (CHANGE IN STATUS NOTIFICATION) - ASSESSMENT
Pt assessed. Complaints of abdominal pain and back pain. No co chest pain.   Pulses assessed 30 minutes ago with doppler, All L LE pulses present. Heparin gtt remains @16.

## 2021-09-18 NOTE — PROVIDER CONTACT NOTE (CRITICAL VALUE NOTIFICATION) - ACTION/TREATMENT ORDERED:
No need for repeat EKG/ no intervention per MD Gifford [phone]. Currently pending results of imaging.
Pending CTA at this time, results of repeat labs, will monitor VS closely and report any change in patient status to vascular team.
Pending stat ABG order and repeat labs. Administered NS bolus, will reassess vital signs. Will call to clarify need for closer cardiac monitoring/interventions.
IV Tylenol ordered, p.o pain medication to be administered. EKG ordered.

## 2021-09-18 NOTE — PROVIDER CONTACT NOTE (CRITICAL VALUE NOTIFICATION) - ASSESSMENT
Pt assessed. Pt has no c/o chest pain, No s/o shortness of breath.   Pt continues to complain of abdominal and lower back pain despite IV Tylenol and p.o Oxycodone administration.   EKG obtained and reviewed by Vascular MD Huffman at bedside.  Heparin gtt @16 remains, pt remains on bedrest, B/ LE pulses present, assessed with doppler.

## 2021-09-18 NOTE — PROGRESS NOTE ADULT - ASSESSMENT
ASSESSMENT:    31 y/o female s/p missed  who presented to ED with LLE pain and swelling that started at midnight. Patient was able to ambulate when pain first started but is currently unable to move LLE. On exam, patient's LLE is tender to palpation, erythematous, edematous, and has a DP of 1+ compared to RLE. On Duplex, patient was found to have DVT of left external iliac, common femoral, femoral, deep femoral, popliteal, posterior tibial, anterior tibial along with thrombus in gastrocnemius vein along with superficial thrombus. Patient is POD 1 s/p thrombectomy, venogram, and left common iliac vein stent. Patient is comfortable on bed and states her leg is feeling better.       PLAN:  - Restart heparin based off repeat PTT level   - Start Eliquis today   - Encourage ambulation   - check dressings   - F/U labs         VASCULAR TEAM SPECTRA: [id number]

## 2021-09-19 ENCOUNTER — TRANSCRIPTION ENCOUNTER (OUTPATIENT)
Age: 31
End: 2021-09-19

## 2021-09-19 LAB
CULTURE RESULTS: SIGNIFICANT CHANGE UP
HCT VFR BLD CALC: 25.9 % — LOW (ref 37–47)
HGB BLD-MCNC: 8.3 G/DL — LOW (ref 12–16)
MCHC RBC-ENTMCNC: 26.3 PG — LOW (ref 27–31)
MCHC RBC-ENTMCNC: 32 G/DL — SIGNIFICANT CHANGE UP (ref 32–37)
MCV RBC AUTO: 82.2 FL — SIGNIFICANT CHANGE UP (ref 81–99)
NRBC # BLD: 0 /100 WBCS — SIGNIFICANT CHANGE UP (ref 0–0)
PLATELET # BLD AUTO: 147 K/UL — SIGNIFICANT CHANGE UP (ref 130–400)
RBC # BLD: 3.15 M/UL — LOW (ref 4.2–5.4)
RBC # FLD: 13.4 % — SIGNIFICANT CHANGE UP (ref 11.5–14.5)
SPECIMEN SOURCE: SIGNIFICANT CHANGE UP
WBC # BLD: 4.96 K/UL — SIGNIFICANT CHANGE UP (ref 4.8–10.8)
WBC # FLD AUTO: 4.96 K/UL — SIGNIFICANT CHANGE UP (ref 4.8–10.8)

## 2021-09-19 PROCEDURE — 99232 SBSQ HOSP IP/OBS MODERATE 35: CPT

## 2021-09-19 RX ORDER — OXYCODONE HYDROCHLORIDE 5 MG/1
1 TABLET ORAL
Qty: 16 | Refills: 0
Start: 2021-09-19 | End: 2021-09-22

## 2021-09-19 RX ORDER — APIXABAN 2.5 MG/1
2 TABLET, FILM COATED ORAL
Qty: 20 | Refills: 0
Start: 2021-09-19 | End: 2021-09-23

## 2021-09-19 RX ADMIN — MORPHINE SULFATE 2 MILLIGRAM(S): 50 CAPSULE, EXTENDED RELEASE ORAL at 09:30

## 2021-09-19 RX ADMIN — APIXABAN 10 MILLIGRAM(S): 2.5 TABLET, FILM COATED ORAL at 17:04

## 2021-09-19 RX ADMIN — APIXABAN 10 MILLIGRAM(S): 2.5 TABLET, FILM COATED ORAL at 05:44

## 2021-09-19 RX ADMIN — MORPHINE SULFATE 2 MILLIGRAM(S): 50 CAPSULE, EXTENDED RELEASE ORAL at 10:00

## 2021-09-19 RX ADMIN — OXYCODONE HYDROCHLORIDE 5 MILLIGRAM(S): 5 TABLET ORAL at 20:02

## 2021-09-19 RX ADMIN — OXYCODONE HYDROCHLORIDE 5 MILLIGRAM(S): 5 TABLET ORAL at 11:21

## 2021-09-19 RX ADMIN — OXYCODONE HYDROCHLORIDE 5 MILLIGRAM(S): 5 TABLET ORAL at 12:00

## 2021-09-19 NOTE — DISCHARGE NOTE PROVIDER - HOSPITAL COURSE
31 y/o female s/p missed  who presented to ED with LLE pain and swelling that started at midnight. Patient was able to ambulate when pain first started but is currently unable to move LLE. On exam, patient's LLE is tender to palpation, erythematous, edematous, and has a DP of 1+ compared to RLE. On Duplex, patient was found to have DVT of left external iliac, common femoral, femoral, deep femoral, popliteal, posterior tibial, anterior tibial along with thrombus in gastrocnemius vein along with superficial thrombus.   Patient was taken to the OR for emergency thrombectomy.      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  ·  PROCEDURES:  Endovascular mechanical thrombectomy of vein 17-Sep-2021 16:40:10 iliofemoral thrombectomy using Inari Flotriever Jonah Wylie.       Operative Findings:  · Operative Findings	1) Venogram, Inari Clotriever thrombectomy  2) IVUS   3) Flotriever deployment in infrarenal IVC w/ aspiration thrombectomy  4) 2x Medtronic Abre 6 x 60mm self-expanding stents deployed in left common/external iliac veins  5) Balloon Angioplasty with 12 x 40mm & 18 x 40mm balloons          Events of past 24 hours: Patient was restarted on heparin drip post op but it is being held until repeat PTT comes back due to PTT being over 200.   Swelling improved  c/o abdominal pain and tachycardia  will obtain CTA C/A/P .    CTA:   IMPRESSION:    Acute pulmonary emboli with thrombus within the right main pulmonary artery with segmental and subsegmental extension into the right upper, middle and lower lobes. Additional segmental and subsegmental thrombus within the left upper and lower lobes.    No CTA evidence of right ventricular heart strain.    Enlarged uterus with multiple fibroids measuring up to 19.9 x 7.3 x 10.6 cm.  GYN saw patient due to bleeding. and made appointment for her this  patient vital signs stable   to be discharged on Eliquis    Vital Signs Last 24 Hrs  T(C): 36.8 (19 Sep 2021 13:13), Max: 37.8 (18 Sep 2021 20:56)  T(F): 98.2 (19 Sep 2021 13:13), Max: 100.1 (18 Sep 2021 20:56)  HR: 98 (19 Sep 2021 13:13) (98 - 120)  BP: 122/63 (19 Sep 2021 13:13) (101/61 - 122/63)  RR: 20 (19 Sep 2021 13:13) (18 - 20)  SpO2: 97% (19 Sep 2021 13:13) (95% - 99%)   31 y/o female s/p missed  who presented to ED with LLE pain and swelling that started at midnight. Patient was able to ambulate when pain first started but is currently unable to move LLE. On exam, patient's LLE is tender to palpation, erythematous, edematous, and has a DP of 1+ compared to RLE. On Duplex, patient was found to have DVT of left external iliac, common femoral, femoral, deep femoral, popliteal, posterior tibial, anterior tibial along with thrombus in gastrocnemius vein along with superficial thrombus.   Patient was taken to the OR for emergency thrombectomy.      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  ·  PROCEDURES:  Endovascular mechanical thrombectomy of vein 17-Sep-2021 16:40:10 iliofemoral thrombectomy using Inari Flotriever Jonah Wylie.       Operative Findings:  · Operative Findings	1) Venogram, Inari Clotriever thrombectomy  2) IVUS   3) Flotriever deployment in infrarenal IVC w/ aspiration thrombectomy  4) 2x Medtronic Abre 6 x 60mm self-expanding stents deployed in left common/external iliac veins  5) Balloon Angioplasty with 12 x 40mm & 18 x 40mm balloons          Events of past 24 hours: Patient was restarted on heparin drip post op but it is being held until repeat PTT comes back due to PTT being over 200.   Swelling improved  c/o abdominal pain and tachycardia  will obtain CTA C/A/P .    CTA:   IMPRESSION:    Acute pulmonary emboli with thrombus within the right main pulmonary artery with segmental and subsegmental extension into the right upper, middle and lower lobes. Additional segmental and subsegmental thrombus within the left upper and lower lobes.    No CTA evidence of right ventricular heart strain.    Enlarged uterus with multiple fibroids measuring up to 19.9 x 7.3 x 10.6 cm.  GYN saw patient due to bleeding. and made appointment for her this  patient vital signs stable   to be discharged on Eliquis     Patient was spoken to this morning about her RLQ abdominal pain that radiates to her back. Patient was informed that it is due to the fibroids seen on CT scan and would be chronic issue. Patient has an appointment with OB on Friday to followup as an outpatient. Patient acknowledges understanding. Will be discharged home on pain medications and Eliquis.     Vital Signs Last 24 Hrs  T(C): 36.8 (19 Sep 2021 13:13), Max: 37.8 (18 Sep 2021 20:56)  T(F): 98.2 (19 Sep 2021 13:13), Max: 100.1 (18 Sep 2021 20:56)  HR: 98 (19 Sep 2021 13:13) (98 - 120)  BP: 122/63 (19 Sep 2021 13:13) (101/61 - 122/63)  RR: 20 (19 Sep 2021 13:13) (18 - 20)  SpO2: 97% (19 Sep 2021 13:13) (95% - 99%)

## 2021-09-19 NOTE — DISCHARGE NOTE PROVIDER - PROVIDER TOKENS
PROVIDER:[TOKEN:[[id number]],FOLLOWUP:[2 weeks]],PROVIDER:[TOKEN:[[id number]],SCHEDULEDAPPT:[09/24/2021],SCHEDULEDAPPTTIME:[10:00 AM]]

## 2021-09-19 NOTE — DISCHARGE NOTE PROVIDER - NSDCMRMEDTOKEN_GEN_ALL_CORE_FT
Eliquis 5 mg oral tablet: 2 tab(s) orally 2 times a day   oxyCODONE 5 mg oral tablet: 1 tab(s) orally every 6 hours, As needed, Severe Pain (7 - 10) MDD:4 tabs  prenatal vitamin: 1 tab(s) orally once a day   apixaban 5 mg oral tablet: 1 tab(s) orally 2 times a day MDD:Please take 10 mg twice a day (4 pills daily) for 5 more days and then take 5mg twice a day (2 pills daily) for the remainder of the pills.  oxyCODONE 5 mg oral tablet: 1 tab(s) orally every 6 hours, As needed, Severe Pain (7 - 10) MDD:4 tabs  prenatal vitamin: 1 tab(s) orally once a day

## 2021-09-19 NOTE — DISCHARGE NOTE PROVIDER - CARE PROVIDER_API CALL
Omar Smith)  Surgery; Vascular Surgery  [address] [place] [address]  Phone: [phone]  Fax: [phone]  Follow Up Time: 2 weeks    Salvador Angela)  Obstetrics and Gynecology; Reproductive EndoInfertility  [address] [place] [address]  Phone: [phone]  Fax: [phone]  Scheduled Appointment: 09/24/2021 10:00 AM

## 2021-09-19 NOTE — DISCHARGE NOTE PROVIDER - NSDCCPCAREPLAN_GEN_ALL_CORE_FT
PRINCIPAL DISCHARGE DIAGNOSIS  Diagnosis: Deep vein thrombosis (DVT)  Assessment and Plan of Treatment: S/P thrombectomy  You must take Eliquis 5mg two pills twice a day (10mg twice a day) for 7 days 9/18-9/24. Then you will take 5mg twice a day for at least 3 months. You will follow up with vascular surgery for exact timing.  Eliquis and oxycodone were sent to your pharmacy on file.  Diet: Continue your regular diet.  Pain: Take tylenol 650mg. Take oxycodone 5mg as needed for breakthrough pain. Please be aware, the medication can cause drowsiness, so reserve for night time use.   Activity: Avoid heavy lifting (anything over 10 pounds) for at least 6 weeks.      SECONDARY DISCHARGE DIAGNOSES  Diagnosis: Threatened miscarriage  Assessment and Plan of Treatment: Please follow up with GYN you have an appointment for Friday at 10AM. Please return to ED if you develop any lightheadedness, hypotension, fever, chills, or if you are soaking through more than 2 pads an hours. Some vaginal bleeding is expected.

## 2021-09-19 NOTE — PROGRESS NOTE ADULT - ATTENDING COMMENTS
s/p thrombectomy    CTA shows B/L PE pt is asymptomatic  continue A/C  Gyn follow up appreciated  D/C planning
Swelling improved  c/o abdominal pain and tachycardia  will obtain CTA C/A/P

## 2021-09-20 ENCOUNTER — TRANSCRIPTION ENCOUNTER (OUTPATIENT)
Age: 31
End: 2021-09-20

## 2021-09-20 VITALS — HEART RATE: 96 BPM

## 2021-09-20 PROCEDURE — 99238 HOSP IP/OBS DSCHRG MGMT 30/<: CPT

## 2021-09-20 RX ORDER — APIXABAN 2.5 MG/1
1 TABLET, FILM COATED ORAL
Qty: 76 | Refills: 0
Start: 2021-09-20 | End: 2021-10-27

## 2021-09-20 RX ADMIN — APIXABAN 10 MILLIGRAM(S): 2.5 TABLET, FILM COATED ORAL at 06:22

## 2021-09-20 RX ADMIN — OXYCODONE HYDROCHLORIDE 5 MILLIGRAM(S): 5 TABLET ORAL at 09:15

## 2021-09-20 RX ADMIN — OXYCODONE HYDROCHLORIDE 5 MILLIGRAM(S): 5 TABLET ORAL at 08:46

## 2021-09-20 NOTE — PROGRESS NOTE ADULT - SUBJECTIVE AND OBJECTIVE BOX
Vascular SURGERY PROGRESS NOTE    Patient: CEASAR QUINTERO , 30y ([birth year])Female   MRN: [id number]  Location: [address]  Visit: 21 Inpatient  Date: 21 @ 05:37    Hospital Day #:3  Post-Op Day #:2    Procedure/Dx/Injuries: S/P thrombectomy, venogram, and left common iliac vein stent       Events of past 24 hours: PT was tachycardic. Midline for CTA study was inserted. CTA OF Chest was done, pt was diagnosed with PE. NO SOB, NO Chest pain. Heparin drip was switched to Eliquis    PAST MEDICAL & SURGICAL HISTORY:  Fibroids    S/P dilation and curettage    H/O myomectomy        Vitals:   T(F): 99.2 (21 @ 04:58), Max: 101.4 (21 @ 08:39)  HR: 98 (21 @ 04:58)  BP: 101/61 (21 @ 04:58)  RR: 20 (21 @ 04:58)  SpO2: 96% (21 @ 04:58)      Diet, Regular      Fluids:     I & O's:    21 @ 07:01  -  21 @ 07:00  --------------------------------------------------------  IN:    Heparin: 12 mL    Lactated Ringers: 200 mL    Oral Fluid: 340 mL  Total IN: 552 mL    OUT:    Voided (mL): 1400 mL  Total OUT: 1400 mL    Total NET: -848 mL      PHYSICAL EXAM:  General: NAD, AAOx3, calm and cooperative  HEENT: NCAT, AMANDEEP, EOMI, Trachea ML, Neck supple  Cardiac: RRR S1, S2, no Murmurs, rubs or gallops  Respiratory: Equal chest rise,  No rales, rhonchi, or wheezing.  Abdomen: Soft, non-distended, non-tender, no rebound, no guarding. +BS.  Musculoskeletal: Strength 5/5 BL UE/LE, ROM intact, compartments soft  Neuro: Sensation grossly intact and equal throughout, no focal deficits  Vascular: Pulses 2+ throughout, extremities well perfused  Skin: Warm/dry, normal color, no jaundice  Incision/wound: healing well, dressings in place, clean, dry and intact    MEDICATIONS  (STANDING):  apixaban 10 milliGRAM(s) Oral every 12 hours  influenza   Vaccine 0.5 milliLiter(s) IntraMuscular once    MEDICATIONS  (PRN):  morphine  - Injectable 2 milliGRAM(s) IV Push every 4 hours PRN Moderate Pain (4 - 6)  oxyCODONE    IR 5 milliGRAM(s) Oral every 6 hours PRN Severe Pain (7 - 10)      DVT PROPHYLAXIS:   GI PROPHYLAXIS:   ANTICOAGULATION:   ANTIBIOTICS:            LAB/STUDIES:  Labs:  CAPILLARY BLOOD GLUCOSE                              8.4    5.17  )-----------( 150      ( 18 Sep 2021 20:00 )             26.2       Auto Neutrophil %: 57.6 % (21 @ 20:00)  Auto Immature Granulocyte %: 0.2 % (21 @ 20:00)  Auto Neutrophil %: 57.7 % (21 @ 11:56)  Auto Immature Granulocyte %: 0.3 % (21 @ 11:56)        138  |  108  |  5<L>  ----------------------------<  105<H>  3.9   |  22  |  0.5<L>      Calcium, Total Serum: 8.1 mg/dL (21 @ 20:00)      LFTs:             6.4  | 0.9  | 18       ------------------[58      ( 17 Sep 2021 17:55 )  3.7  | x    | 9           Lipase:x      Amylase:x         Blood Gas Arterial, Lactate: 0.90 mmol/L (21 @ 09:00)    ABG - ( 18 Sep 2021 09:00 )  pH: 7.46  /  pCO2: 33    /  pO2: 41    / HCO3: 24    / Base Excess: 0.3   /  SaO2: 75.0              Coags:     14.80  ----< 1.29    ( 18 Sep 2021 20:00 )     33.5        CARDIAC MARKERS ( 18 Sep 2021 11:56 )  x     / <0.01 ng/mL / x     / x     / x              Urinalysis Basic - ( 17 Sep 2021 08:15 )    Color: Yellow / Appearance: Clear / S.024 / pH: x  Gluc: x / Ketone: Small  / Bili: Negative / Urobili: <2 mg/dL   Blood: x / Protein: 30 mg/dL / Nitrite: Negative   Leuk Esterase: Negative / RBC: 74 /HPF / WBC 3 /HPF   Sq Epi: x / Non Sq Epi: 2 /HPF / Bacteria: Negative      IMAGING:  CTA OF CHEST :     IMPRESSION:    Acute pulmonary emboli with thrombus within the right main pulmonary artery with segmental and subsegmental extension into the right upper, middle and lower lobes. Additional segmental and subsegmental thrombus within the left upper and lower lobes.    No CTA evidence of right ventricular heart strain.    Enlarged uterus with multiple fibroids measuring up to 19.9 x 7.3 x 10.6 cm.      ACCESS/ DEVICES:  [x] Peripheral IV  [ ] Central Venous Line	[ ] R	[ ] L	[ ] IJ	[ ] Fem	[ ] SC	Placed:   [ ] Arterial Line		[ ] R	[ ] L	[ ] Fem	[ ] Rad	[ ] Ax	Placed:   [ ] PICC:					[x] Midline  [ ] Urinary Catheter,  Date Placed:   [ ] Chest tube: [ ] Right, [ ] Left  [ ] BAO/Teo Drains      PLAN:  - Cont. Eliquis   - Encourage ambulation   - Check dressings   - F/U labs   - Monitor vitals  - GI Prophylaxis    Lines/Tubes: PIV, Midline.      CT TEAM SPECTRA: [id number]      
CEASAR CHAVEZWANYANWU  30y Female   [id number]    Hospital Day: 4  Post Operative Day:3  Procedure:s/p ivc thrombectomy  venogram left common iliac vein stent   Patient is a 30y old  Female who presents with a chief complaint of Extensive LLE DVT (19 Sep 2021 16:15)    PAST MEDICAL & SURGICAL HISTORY:  Fibroids    S/P dilation and curettage    H/O myomectomy        Events of the Last 24h:  Vital Signs Last 24 Hrs  T(C): 37.4 (20 Sep 2021 00:58), Max: 37.4 (19 Sep 2021 20:55)  T(F): 99.4 (20 Sep 2021 00:58), Max: 99.4 (19 Sep 2021 20:55)  HR: 99 (20 Sep 2021 00:58) (96 - 105)  BP: 112/73 (20 Sep 2021 00:58) (101/61 - 122/68)  BP(mean): --  RR: 20 (20 Sep 2021 00:58) (20 - 20)  SpO2: 100% (20 Sep 2021 00:58) (95% - 100%)        Diet, Regular (21 @ 16:51)      I&O's Summary    18 Sep 2021 07:  -  19 Sep 2021 07:00  --------------------------------------------------------  IN: 450 mL / OUT: 4250 mL / NET: -3800 mL    19 Sep 2021 07:  -  20 Sep 2021 01:00  --------------------------------------------------------  IN: 330 mL / OUT: 1600 mL / NET: -1270 mL     I&O's Detail    18 Sep 2021 07:01  -  19 Sep 2021 07:00  --------------------------------------------------------  IN:    Oral Fluid: 450 mL  Total IN: 450 mL    OUT:    Voided (mL): 4250 mL  Total OUT: 4250 mL    Total NET: -3800 mL      19 Sep 2021 07:01  -  20 Sep 2021 01:00  --------------------------------------------------------  IN:    Oral Fluid: 330 mL  Total IN: 330 mL    OUT:    Voided (mL): 1600 mL  Total OUT: 1600 mL    Total NET: -1270 mL          MEDICATIONS  (STANDING):  apixaban 10 milliGRAM(s) Oral every 12 hours  influenza   Vaccine 0.5 milliLiter(s) IntraMuscular once    MEDICATIONS  (PRN):  morphine  - Injectable 2 milliGRAM(s) IV Push every 4 hours PRN Moderate Pain (4 - 6)  oxyCODONE    IR 5 milliGRAM(s) Oral every 6 hours PRN Severe Pain (7 - 10)      PHYSICAL EXAM:    GENERAL: NAD    HEENT: NCAT    CHEST/LUNGS: CTAB    HEART: RRR,  No murmurs, rubs, or gallops    ABDOMEN: SNTND +BS    EXTREMITIES:  FROM, No clubbing, cyanosis, or edema, palpable pulse    NEURO: No focal neurological deficits    SKIN: No rashes or lesions    INCISION/WOUNDS:                          8.3    4.96  )-----------( 147      ( 19 Sep 2021 06:30 )             25.9        CBC Full  -  ( 19 Sep 2021 06:30 )  WBC Count : 4.96 K/uL  RBC Count : 3.15 M/uL  Hemoglobin : 8.3 g/dL  Hematocrit : 25.9 %  Platelet Count - Automated : 147 K/uL  Mean Cell Volume : 82.2 fL  Mean Cell Hemoglobin : 26.3 pg  Mean Cell Hemoglobin Concentration : 32.0 g/dL  Auto Neutrophil # : x  Auto Lymphocyte # : x  Auto Monocyte # : x  Auto Eosinophil # : x  Auto Basophil # : x  Auto Neutrophil % : x  Auto Lymphocyte % : x  Auto Monocyte % : x  Auto Eosinophil % : x  Auto Basophil % : x               138   |  108   |  5                  Ca: 8.1    BMP:   ----------------------------< 105    M.0   (21 @ 20:00)             3.9    |  22    | 0.5                Ph: 1.9      LFT:     TPro: 6.4 / Alb: 3.7 / TBili: 0.9 / DBili: x / AST: 18 / ALT: 9 / AlkPhos: 58   (21 @ 17:55)      PT/INR - ( 18 Sep 2021 20:00 )   PT: 14.80 sec;   INR: 1.29 ratio         PTT - ( 18 Sep 2021 20:00 )  PTT:33.5 sec  CARDIAC MARKERS ( 18 Sep 2021 11:56 )  x     / <0.01 ng/mL / x     / x     / x              Culture - Urine (collected 17 Sep 2021 08:15)  Source: Clean Catch Clean Catch (Midstream)  Final Report (19 Sep 2021 22:36):    >=3 organisms. Probable collection contamination.      
VASCULAR SURGERY PROGRESS NOTE    CC: LLE pain   Hospital Day # 2  Post-Op Day # 1    Procedure: S/P thrombectomy, venogram, and left common iliac vein stent     Events of past 24 hours: Patient was restarted on heparin drip post op but it is being held until repeat PTT comes back due to PTT being over 200.       ROS otherwise negative except per subjective and HPI      PAST MEDICAL & SURGICAL HISTORY:  Fibroids    S/P dilation and curettage    H/O myomectomy        Vital Signs Last 24 Hrs  T(C): 36.9 (18 Sep 2021 01:00), Max: 36.9 (18 Sep 2021 01:00)  T(F): 98.5 (18 Sep 2021 01:00), Max: 98.5 (18 Sep 2021 01:00)  HR: 99 (18 Sep 2021 01:) (95 - 101)  BP: 112/63 (18 Sep 2021 01:00) (109/58 - 148/80)  BP(mean): 95 (17 Sep 2021 20:25) (92 - 102)  RR: 18 (18 Sep 2021 01:00) (15 - 24)  SpO2: 98% (18 Sep 2021 01:00) (97% - 100%)      I&O's Detail    17 Sep 2021 07:01  -  18 Sep 2021 02:54  --------------------------------------------------------  IN:    Heparin: 12 mL    Lactated Ringers: 200 mL    Oral Fluid: 340 mL  Total IN: 552 mL    OUT:    Voided (mL): 800 mL  Total OUT: 800 mL    Total NET: -248 mL      PHYSICAL EXAM    CONSTITUTIONAL: comfortable and NAD  SKIN: Warm dry, normal skin turgor  HEAD: NCAT  EYES: EOMI, no scleral icterus  ENT: Moist mucous membranes, normal pharynx with no erythema or exudates  NECK: Supple; non tender. Full ROM.   CARD: RRR, no murmurs, rubs or gallops  RESP: clear to ausculation b/l.  No rales, rhonchi, or wheezing.  ABD: soft, non-tender, non-distended, no rebound or guarding.   EXT: LLE is less erythematous and tender than initial presentation. Decrease in size compared to primary presentation.       MEDICATIONS:   MEDICATIONS  (STANDING):  influenza   Vaccine 0.5 milliLiter(s) IntraMuscular once    MEDICATIONS  (PRN):  oxyCODONE    IR 5 milliGRAM(s) Oral every 6 hours PRN Severe Pain (7 - 10)      DVT PROPHYLAXIS: [] YES [] NO  GI PROPHYLAXIS: [] YES [] NO  ANTIPLATELETS: [] YES [] NO  ANTICOAGULATION: [] YES [] NO  ANTIBIOTICS: [] YES [] NO    LAB/STUDIES:                        10.0   6.61  )-----------( 149      ( 17 Sep 2021 20:20 )             31.3         137  |  104  |  5<L>  ----------------------------<  106<H>  4.6   |  19  |  0.6<L>    Ca    8.6      17 Sep 2021 20:20  Phos  3.6       Mg     2.4         TPro  6.4  /  Alb  3.7  /  TBili  0.9  /  DBili  x   /  AST  18  /  ALT  9   /  AlkPhos  58      PT/INR - ( 18 Sep 2021 00:46 )   PT: 13.00 sec;   INR: 1.13 ratio         PTT - ( 18 Sep 2021 00:46 )  PTT:36.5 sec  LIVER FUNCTIONS - ( 17 Sep 2021 17:55 )  Alb: 3.7 g/dL / Pro: 6.4 g/dL / ALK PHOS: 58 U/L / ALT: 9 U/L / AST: 18 U/L / GGT: x             Urinalysis Basic - ( 17 Sep 2021 08:15 )    Color: Yellow / Appearance: Clear / S.024 / pH: x  Gluc: x / Ketone: Small  / Bili: Negative / Urobili: <2 mg/dL   Blood: x / Protein: 30 mg/dL / Nitrite: Negative   Leuk Esterase: Negative / RBC: 74 /HPF / WBC 3 /HPF   Sq Epi: x / Non Sq Epi: 2 /HPF / Bacteria: Negative

## 2021-09-20 NOTE — DISCHARGE NOTE NURSING/CASE MANAGEMENT/SOCIAL WORK - PATIENT PORTAL LINK FT
You can access the FollowMyHealth Patient Portal offered by [facility] by registering at the following website: [email or web address]. By joining [facility]’s FollowMyHealth portal, you will also be able to view your health information using other applications (apps) compatible with our system.

## 2021-09-21 LAB — SURGICAL PATHOLOGY STUDY: SIGNIFICANT CHANGE UP

## 2021-09-29 DIAGNOSIS — I82.462 ACUTE EMBOLISM AND THROMBOSIS OF LEFT CALF MUSCULAR VEIN: ICD-10-CM

## 2021-09-29 DIAGNOSIS — I82.422 ACUTE EMBOLISM AND THROMBOSIS OF LEFT ILIAC VEIN: ICD-10-CM

## 2021-09-29 DIAGNOSIS — I26.94 MULTIPLE SUBSEGMENTAL PULMONARY EMBOLI WITHOUT ACUTE COR PULMONALE: ICD-10-CM

## 2021-09-29 DIAGNOSIS — I82.412 ACUTE EMBOLISM AND THROMBOSIS OF LEFT FEMORAL VEIN: ICD-10-CM

## 2021-09-29 DIAGNOSIS — I82.452 ACUTE EMBOLISM AND THROMBOSIS OF LEFT PERONEAL VEIN: ICD-10-CM

## 2021-09-29 DIAGNOSIS — O08.2: ICD-10-CM

## 2021-09-29 DIAGNOSIS — I82.442 ACUTE EMBOLISM AND THROMBOSIS OF LEFT TIBIAL VEIN: ICD-10-CM

## 2021-09-29 DIAGNOSIS — O02.1 MISSED ABORTION: ICD-10-CM

## 2021-09-29 DIAGNOSIS — O08.7: ICD-10-CM

## 2021-09-30 ENCOUNTER — APPOINTMENT (OUTPATIENT)
Dept: VASCULAR SURGERY | Facility: CLINIC | Age: 31
End: 2021-09-30
Payer: COMMERCIAL

## 2021-09-30 VITALS
HEART RATE: 99 BPM | DIASTOLIC BLOOD PRESSURE: 78 MMHG | BODY MASS INDEX: 28.93 KG/M2 | TEMPERATURE: 97.5 F | WEIGHT: 180 LBS | SYSTOLIC BLOOD PRESSURE: 125 MMHG | HEIGHT: 66 IN

## 2021-09-30 PROCEDURE — 93971 EXTREMITY STUDY: CPT | Mod: LT

## 2021-09-30 PROCEDURE — 99214 OFFICE O/P EST MOD 30 MIN: CPT

## 2021-09-30 NOTE — ASSESSMENT
[FreeTextEntry1] : The patient is a 30 year old with DVT and PE. The patient has a large fibroid which may need a proceedure to reduce the compression on the IVC and iliac veins. I will speak to her GYN. and discuss the plan. I would continue her on AC for 6 months. I will rescan her in 3 months to evaluate her DVT.

## 2021-09-30 NOTE — DATA REVIEWED
[FreeTextEntry1] : Venous duplex showed no evidence of DVT in the iliac, and femoral vein. The Iliac veins stent are present.

## 2021-09-30 NOTE — HISTORY OF PRESENT ILLNESS
[FreeTextEntry1] : Ms. Gomez is a 30 year old who presented withe left leg DVT. Pt had a venous thrombectomy and iliac vein stent. CT scan revealed a large fibroid uterus causing compression of the iliac vein and IVC. She is currently on AC. Her swelling has improved.

## 2021-11-29 NOTE — PAST MEDICAL HISTORY
[Menstruating] : The patient is menstruating [Total Preg ___] : G[unfilled] [Full Term ___] : Full Term: [unfilled] [Living ___] : Living: [unfilled]

## 2021-11-30 ENCOUNTER — APPOINTMENT (OUTPATIENT)
Dept: GYNECOLOGIC ONCOLOGY | Facility: CLINIC | Age: 31
End: 2021-11-30
Payer: COMMERCIAL

## 2021-11-30 VITALS
WEIGHT: 178 LBS | SYSTOLIC BLOOD PRESSURE: 120 MMHG | BODY MASS INDEX: 28.61 KG/M2 | DIASTOLIC BLOOD PRESSURE: 72 MMHG | RESPIRATION RATE: 16 BRPM | HEIGHT: 66 IN

## 2021-11-30 DIAGNOSIS — D21.9 BENIGN NEOPLASM OF CONNECTIVE AND OTHER SOFT TISSUE, UNSPECIFIED: ICD-10-CM

## 2021-11-30 DIAGNOSIS — D64.9 ANEMIA, UNSPECIFIED: ICD-10-CM

## 2021-11-30 PROCEDURE — 99203 OFFICE O/P NEW LOW 30 MIN: CPT

## 2021-12-02 PROBLEM — D21.9 FIBROIDS: Status: ACTIVE | Noted: 2017-03-31

## 2021-12-02 PROBLEM — D64.9 ANEMIA: Status: ACTIVE | Noted: 2019-05-16

## 2021-12-02 NOTE — HISTORY OF PRESENT ILLNESS
[FreeTextEntry1] : 31 year old female  , referred by Dr. Brice Mtz (PMD) for further evaluation of anemia and menorrhagia.  The patient is  with a history of  abdominal myomectomy in 2019 for uterine fibroids by Dr. Moreno.  She is anemic secondary to menorrhagia.  She is under the care of hematology and treated with Venofer and vitamin supplements.   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   She underwent thrombectomy.  She remains on Eliquis.  Venous Doppler now negative for any clots. The patient is Jehovah Witness and refuses any blood products or transfusions. She was referred to discuss the possibility of hysterectomy.  However after further discussions the patient is not ready for this step as she is still hoping to maintain her ability to conceive.\par

## 2021-12-02 NOTE — ASSESSMENT
[FreeTextEntry1] : 31 year old female  , referred by Dr. Mendoza for further evaluation of anemia and menorrhagia.  The patient is  with a history of  abdominal myomectomy in 2019 for uterine fibroids by Dr. Moreno.  She is anemic secondary to menorrhagia.  She is under the care of hematology and treated with Venofer and vitamin supplements.   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   She underwent thrombectomy.  She remains on Eliquis.  Venous Doppler now negative for any clots. The patient is Jehovah Witness and refuses any blood products or transfusions. She was referred to discuss the possibility of hysterectomy.  However after further discussions the patient is not ready for this step as she is still hoping to maintain her ability to conceive.

## 2021-12-23 ENCOUNTER — APPOINTMENT (OUTPATIENT)
Dept: OBGYN | Facility: CLINIC | Age: 31
End: 2021-12-23

## 2022-01-06 NOTE — PATIENT PROFILE ADULT - NSASFUNCLEVELADLTOILET_GEN_A_NUR
Head,  normocephalic,  atraumatic,  Face,  Face within normal limits,  Ears,  External ears within normal limits,  Nose/Nasopharynx,  External nose  normal appearance,  nares patent,  no nasal discharge,  Mouth and Throat,  Oral cavity appearance normal,  Breath odor normal,  Lips,  Appearance normal
2 = assistive person

## 2022-01-20 ENCOUNTER — APPOINTMENT (OUTPATIENT)
Dept: VASCULAR SURGERY | Facility: CLINIC | Age: 32
End: 2022-01-20
Payer: COMMERCIAL

## 2022-01-20 VITALS
SYSTOLIC BLOOD PRESSURE: 122 MMHG | BODY MASS INDEX: 28.93 KG/M2 | DIASTOLIC BLOOD PRESSURE: 72 MMHG | WEIGHT: 180 LBS | HEIGHT: 66 IN | HEART RATE: 86 BPM

## 2022-01-20 PROCEDURE — 93971 EXTREMITY STUDY: CPT

## 2022-01-20 PROCEDURE — 99214 OFFICE O/P EST MOD 30 MIN: CPT

## 2022-01-20 NOTE — ASSESSMENT
[FreeTextEntry1] : 31 year old with LLE DVT and PE in September 2021, s/p thrombectomy and left iliac vein stent, on Eliquis for 5 months now. She was seen by GYN who recommended hysterectomy but she wants to hold off for now. She plans to travel to [facility] for myomectomy.\par \par I recommend that she stay on anticoagulation until myomectomy and she can hold Eliquis 2 days prior to the surgery. I will see her back after she returns from [place] after surgery.\par

## 2022-01-20 NOTE — HISTORY OF PRESENT ILLNESS
[FreeTextEntry1] : Ms. Gomez is a 31 year old who presented withe left leg DVT. Pt had a venous thrombectomy and iliac vein stent in September 2021. CT scan revealed a large fibroid uterus causing compression of the iliac vein and IVC. She is currently on AC.

## 2022-01-20 NOTE — DATA REVIEWED
[FreeTextEntry1] : I performed a venous duplex which was medically necessary to evaluate for DVT. It showed no evidence of DVT, patent iliac vein stent.\par

## 2022-03-27 ENCOUNTER — TRANSCRIPTION ENCOUNTER (OUTPATIENT)
Age: 32
End: 2022-03-27

## 2022-04-05 ENCOUNTER — APPOINTMENT (OUTPATIENT)
Dept: VASCULAR SURGERY | Facility: CLINIC | Age: 32
End: 2022-04-05
Payer: COMMERCIAL

## 2022-04-05 VITALS — HEIGHT: 66 IN | BODY MASS INDEX: 27.97 KG/M2 | WEIGHT: 174 LBS

## 2022-04-05 PROCEDURE — 99214 OFFICE O/P EST MOD 30 MIN: CPT

## 2022-04-05 PROCEDURE — 93971 EXTREMITY STUDY: CPT

## 2022-04-05 NOTE — ASSESSMENT
[FreeTextEntry1] : 31 year old with LLE DVT and PE in September 2021, s/p thrombectomy and left iliac vein stent, on Eliquis for 8 months now. . She recently returned fro  [facility] s/p  myomectomy 3/10/22. She tolerated the procedure well/ I believe her DVT was secondary to compression of the vein from her fibroid.\par I recommend she remain on anticoagulation for the next 3 months. She has informed me she is going to have IVF She will hold off until June

## 2022-04-05 NOTE — HISTORY OF PRESENT ILLNESS
[FreeTextEntry1] : Ms. Gomez is a 31 year old who presented withe left leg DVT. Pt had a venous thrombectomy and iliac vein stent in September 2021. CT scan revealed a large fibroid uterus causing compression of the iliac vein and IVC. She is currently on AC. . She recently returned fro  [facility] s/p  myomectomy 3/10/22. She tolerated the procedure well/ I believe her DVT was secondary to compression of the vein from her fibroid.

## 2022-04-16 ENCOUNTER — TRANSCRIPTION ENCOUNTER (OUTPATIENT)
Age: 32
End: 2022-04-16

## 2022-06-08 NOTE — H&P PST ADULT - FALL HARM RISK
44 y/o F presents to ED c/o 4 episodes of diarrhea pta.  She states the initial bm was black and the subsequent ones were brown.  She no longer has diarrhea or any other symptoms while in the ED.  Pt 42 y/o F presents to ED c/o 4 episodes of diarrhea pta.  She states the initial BM was black diarrhea and the subsequent ones were brown.  She no longer has diarrhea or any other symptoms while in the ED.    Pt denies trauma/falls, fevers/chills, neck or back pain, headache, visual changes, sore throat, chest pain, palpitations, cough, SOB, abd pain, nausea, vomiting, dysuria, hematuria, weakness, dizziness, numbness, lower extremity swelling, rash, sick contacts, recent hospitalizations, recent travels. not at this time

## 2022-06-14 ENCOUNTER — APPOINTMENT (OUTPATIENT)
Dept: VASCULAR SURGERY | Facility: CLINIC | Age: 32
End: 2022-06-14
Payer: COMMERCIAL

## 2022-06-14 VITALS
DIASTOLIC BLOOD PRESSURE: 76 MMHG | HEIGHT: 66 IN | WEIGHT: 180 LBS | BODY MASS INDEX: 28.93 KG/M2 | SYSTOLIC BLOOD PRESSURE: 120 MMHG

## 2022-06-14 PROCEDURE — 99214 OFFICE O/P EST MOD 30 MIN: CPT

## 2022-06-14 PROCEDURE — 93971 EXTREMITY STUDY: CPT

## 2022-06-14 NOTE — ASSESSMENT
[FreeTextEntry1] : Ms. Gomez is a 31 year old with left leg DVT and underwent a venous thrombectomy and iliac vein stent on 9/17/2021. CT scan had revealed a large fibroid uterus causing compression of the iliac vein and IVC and underwent myomectomy in [place] in March 2022.\par \par Duplex today showed no evidence of DVT in the left lower extremity and the iliac vein stent was noted to be patent. She has completed adequate anticoagulation and can discontinue Eliquis. \par \par Advised to stay on Aspirin 81 mg daily.\par \par I will see her back in 6 months time. No contraindication from vascular standpoint to undergo IVF.\par \par I spent 40 minutes with patient performing physical exam, reviewing duplex results, discussing treatment plan and followup.\par

## 2022-06-14 NOTE — DATA REVIEWED
[FreeTextEntry1] : I performed a venous duplex which was medically necessary to evaluate for DVT. It showed no evidence of DVT and patent left iliac vein stent.\par

## 2022-06-14 NOTE — HISTORY OF PRESENT ILLNESS
[FreeTextEntry1] : Ms. Gomez is a 31 year old who presented with left leg DVT. Pt had a venous thrombectomy and iliac vein stent on 9/17/2021. CT scan had revealed a large fibroid uterus causing compression of the iliac vein and IVC and underwent myomectomy in [place] in March 2022. She is currently on Eliquis. Her swelling has improved. \par

## 2022-07-08 ENCOUNTER — APPOINTMENT (OUTPATIENT)
Dept: OBGYN | Facility: CLINIC | Age: 32
End: 2022-07-08

## 2022-07-08 ENCOUNTER — NON-APPOINTMENT (OUTPATIENT)
Age: 32
End: 2022-07-08

## 2022-07-08 ENCOUNTER — OUTPATIENT (OUTPATIENT)
Dept: OUTPATIENT SERVICES | Facility: HOSPITAL | Age: 32
LOS: 1 days | Discharge: HOME | End: 2022-07-08

## 2022-07-08 VITALS
HEIGHT: 66 IN | DIASTOLIC BLOOD PRESSURE: 62 MMHG | BODY MASS INDEX: 30.57 KG/M2 | SYSTOLIC BLOOD PRESSURE: 106 MMHG | WEIGHT: 190.19 LBS

## 2022-07-08 DIAGNOSIS — Z98.890 OTHER SPECIFIED POSTPROCEDURAL STATES: Chronic | ICD-10-CM

## 2022-07-08 DIAGNOSIS — Z01.419 ENCOUNTER FOR GYNECOLOGICAL EXAMINATION (GENERAL) (ROUTINE) W/OUT ABNORMAL FINDINGS: ICD-10-CM

## 2022-07-08 PROCEDURE — 99395 PREV VISIT EST AGE 18-39: CPT

## 2022-07-08 NOTE — PHYSICAL EXAM
[Chaperone Present] : A chaperone was present in the examining room during all aspects of the physical examination [Appropriately responsive] : appropriately responsive [Alert] : alert [No Acute Distress] : no acute distress [No Lymphadenopathy] : no lymphadenopathy [Regular Rate Rhythm] : regular rate rhythm [No Murmurs] : no murmurs [Clear to Auscultation B/L] : clear to auscultation bilaterally [Soft] : soft [Non-tender] : non-tender [Non-distended] : non-distended [No HSM] : No HSM [No Lesions] : no lesions [No Mass] : no mass [Oriented x3] : oriented x3 [Examination Of The Breasts] : a normal appearance [No Masses] : no breast masses were palpable [Labia Minora] : normal [Labia Majora] : normal [Normal] : normal [Enlarged ___ wks] : enlarged [unfilled] ~Uweeks [Uterine Adnexae] : normal

## 2022-07-08 NOTE — DISCUSSION/SUMMARY
[FreeTextEntry1] : 30yo P0 for annual exam\par -pap/hpv\par -f/u with [facility] for IVF\par -f/u in 1 yr or sooner PRN

## 2022-07-08 NOTE — HISTORY OF PRESENT ILLNESS
[FreeTextEntry1] : 30yo P0 here fo rannual exam without complaints\par Pt desiring pregnancy, undergoing IVF with Dr. Kwan, Had abdominal myomectomy in March. No complaints with menses\par

## 2022-07-12 LAB — HPV HIGH+LOW RISK DNA PNL CVX: NOT DETECTED

## 2022-07-14 LAB — CYTOLOGY CVX/VAG DOC THIN PREP: NORMAL

## 2022-09-06 NOTE — ED PROVIDER NOTE - NS ED MD DISPO DISCHARGE CCDA
Rx refill request:rivaroxaban (XARELTO) 20 MG TABS tablet  20 MG   #90    [facility] Patient/Caregiver provided printed discharge information.

## 2022-11-23 NOTE — PROCEDURE NOTE - NSINDICATIONS_GEN_A_CORE
11/23/22 - Called patient in regards to missed remote transmission on 11/23/2022.  Patient reports she will send transmission today.  
FOR CTA of CHEST/venous access

## 2022-12-13 ENCOUNTER — APPOINTMENT (OUTPATIENT)
Dept: VASCULAR SURGERY | Facility: CLINIC | Age: 32
End: 2022-12-13

## 2022-12-13 NOTE — PATIENT PROFILE ADULT - NSPRESCRALCFREQ_GEN_A_NUR
Never Low Dose Naltrexone Counseling- I discussed with the patient the potential risks and side effects of low dose naltrexone including but not limited to: more vivid dreams, headaches, nausea, vomiting, abdominal pain, fatigue, dizziness, and anxiety.

## 2023-02-08 NOTE — ED ADULT TRIAGE NOTE - HEIGHT IN FEET
[FreeTextEntry1] : Recurrent iron deficiency anemia, currently tolerating and responding to oral iron.  Because of prior infusion reaction with intravenous Feraheme, proceed with oral replacement.\par \par \par Today's HGB 9.9, HCT 32.7\par Continue oral iron BID\par F/u in 3 months 
5

## 2023-07-07 ENCOUNTER — APPOINTMENT (OUTPATIENT)
Dept: OPHTHALMOLOGY | Facility: CLINIC | Age: 33
End: 2023-07-07
Payer: COMMERCIAL

## 2023-07-07 ENCOUNTER — OUTPATIENT (OUTPATIENT)
Dept: OUTPATIENT SERVICES | Facility: HOSPITAL | Age: 33
LOS: 1 days | End: 2023-07-07
Payer: COMMERCIAL

## 2023-07-07 DIAGNOSIS — H53.8 OTHER VISUAL DISTURBANCES: ICD-10-CM

## 2023-07-07 PROBLEM — Z00.00 ENCOUNTER FOR PREVENTIVE HEALTH EXAMINATION: Status: ACTIVE | Noted: 2023-07-07

## 2023-07-07 PROCEDURE — 92002 INTRM OPH EXAM NEW PATIENT: CPT

## 2023-07-10 NOTE — DISCHARGE NOTE NURSING/CASE MANAGEMENT/SOCIAL WORK - COMPLETE THE FOLLOWING IF THE PATIENT REFUSES THE INFLUENZA VACCINE:
Addended by: Mo Cortez on: 7/10/2023 12:54 PM     Modules accepted: Orders Risks/benefits discussed with patient/surrogate

## 2023-07-15 ENCOUNTER — INPATIENT (INPATIENT)
Facility: HOSPITAL | Age: 33
LOS: 3 days | Discharge: HOME | DRG: 176 | End: 2023-07-19
Attending: INTERNAL MEDICINE | Admitting: INTERNAL MEDICINE
Payer: COMMERCIAL

## 2023-07-15 VITALS
SYSTOLIC BLOOD PRESSURE: 110 MMHG | TEMPERATURE: 97 F | HEIGHT: 66 IN | WEIGHT: 190.04 LBS | DIASTOLIC BLOOD PRESSURE: 74 MMHG | HEART RATE: 121 BPM | RESPIRATION RATE: 26 BRPM | OXYGEN SATURATION: 100 %

## 2023-07-15 DIAGNOSIS — Z98.890 OTHER SPECIFIED POSTPROCEDURAL STATES: Chronic | ICD-10-CM

## 2023-07-15 DIAGNOSIS — I26.99 OTHER PULMONARY EMBOLISM WITHOUT ACUTE COR PULMONALE: ICD-10-CM

## 2023-07-15 LAB
ALBUMIN SERPL ELPH-MCNC: 3.6 G/DL — SIGNIFICANT CHANGE UP (ref 3.5–5.2)
ALP SERPL-CCNC: 53 U/L — SIGNIFICANT CHANGE UP (ref 30–115)
ALT FLD-CCNC: 11 U/L — SIGNIFICANT CHANGE UP (ref 0–41)
ANION GAP SERPL CALC-SCNC: 13 MMOL/L — SIGNIFICANT CHANGE UP (ref 7–14)
APTT BLD: 31.1 SEC — SIGNIFICANT CHANGE UP (ref 27–39.2)
AST SERPL-CCNC: 20 U/L — SIGNIFICANT CHANGE UP (ref 0–41)
BASOPHILS # BLD AUTO: 0.04 K/UL — SIGNIFICANT CHANGE UP (ref 0–0.2)
BASOPHILS NFR BLD AUTO: 0.4 % — SIGNIFICANT CHANGE UP (ref 0–1)
BILIRUB SERPL-MCNC: 0.5 MG/DL — SIGNIFICANT CHANGE UP (ref 0.2–1.2)
BUN SERPL-MCNC: 8 MG/DL — LOW (ref 10–20)
CALCIUM SERPL-MCNC: 8.8 MG/DL — SIGNIFICANT CHANGE UP (ref 8.4–10.5)
CHLORIDE SERPL-SCNC: 95 MMOL/L — LOW (ref 98–110)
CO2 SERPL-SCNC: 19 MMOL/L — SIGNIFICANT CHANGE UP (ref 17–32)
CREAT SERPL-MCNC: 1 MG/DL — SIGNIFICANT CHANGE UP (ref 0.7–1.5)
EGFR: 76 ML/MIN/1.73M2 — SIGNIFICANT CHANGE UP
EOSINOPHIL # BLD AUTO: 0.03 K/UL — SIGNIFICANT CHANGE UP (ref 0–0.7)
EOSINOPHIL NFR BLD AUTO: 0.3 % — SIGNIFICANT CHANGE UP (ref 0–8)
GLUCOSE SERPL-MCNC: 86 MG/DL — SIGNIFICANT CHANGE UP (ref 70–99)
HCG SERPL QL: POSITIVE — SIGNIFICANT CHANGE UP
HCT VFR BLD CALC: 44.2 % — SIGNIFICANT CHANGE UP (ref 37–47)
HGB BLD-MCNC: 14.7 G/DL — SIGNIFICANT CHANGE UP (ref 12–16)
IMM GRANULOCYTES NFR BLD AUTO: 0.3 % — SIGNIFICANT CHANGE UP (ref 0.1–0.3)
INR BLD: 1.05 RATIO — SIGNIFICANT CHANGE UP (ref 0.65–1.3)
LYMPHOCYTES # BLD AUTO: 2.63 K/UL — SIGNIFICANT CHANGE UP (ref 1.2–3.4)
LYMPHOCYTES # BLD AUTO: 24.6 % — SIGNIFICANT CHANGE UP (ref 20.5–51.1)
MAGNESIUM SERPL-MCNC: 2.3 MG/DL — SIGNIFICANT CHANGE UP (ref 1.8–2.4)
MCHC RBC-ENTMCNC: 26.9 PG — LOW (ref 27–31)
MCHC RBC-ENTMCNC: 33.3 G/DL — SIGNIFICANT CHANGE UP (ref 32–37)
MCV RBC AUTO: 80.8 FL — LOW (ref 81–99)
MONOCYTES # BLD AUTO: 0.77 K/UL — HIGH (ref 0.1–0.6)
MONOCYTES NFR BLD AUTO: 7.2 % — SIGNIFICANT CHANGE UP (ref 1.7–9.3)
NEUTROPHILS # BLD AUTO: 7.19 K/UL — HIGH (ref 1.4–6.5)
NEUTROPHILS NFR BLD AUTO: 67.2 % — SIGNIFICANT CHANGE UP (ref 42.2–75.2)
NRBC # BLD: 0 /100 WBCS — SIGNIFICANT CHANGE UP (ref 0–0)
NT-PROBNP SERPL-SCNC: 131 PG/ML — SIGNIFICANT CHANGE UP (ref 0–300)
PHOSPHATE SERPL-MCNC: 3.3 MG/DL — SIGNIFICANT CHANGE UP (ref 2.1–4.9)
PLATELET # BLD AUTO: 281 K/UL — SIGNIFICANT CHANGE UP (ref 130–400)
PMV BLD: 11.8 FL — HIGH (ref 7.4–10.4)
POTASSIUM SERPL-MCNC: 4.9 MMOL/L — SIGNIFICANT CHANGE UP (ref 3.5–5)
POTASSIUM SERPL-SCNC: 4.9 MMOL/L — SIGNIFICANT CHANGE UP (ref 3.5–5)
PROT SERPL-MCNC: 5.9 G/DL — LOW (ref 6–8)
PROTHROM AB SERPL-ACNC: 12 SEC — SIGNIFICANT CHANGE UP (ref 9.95–12.87)
RBC # BLD: 5.47 M/UL — HIGH (ref 4.2–5.4)
RBC # FLD: 12.3 % — SIGNIFICANT CHANGE UP (ref 11.5–14.5)
SODIUM SERPL-SCNC: 127 MMOL/L — LOW (ref 135–146)
TROPONIN T SERPL-MCNC: <0.01 NG/ML — SIGNIFICANT CHANGE UP
WBC # BLD: 10.69 K/UL — SIGNIFICANT CHANGE UP (ref 4.8–10.8)
WBC # FLD AUTO: 10.69 K/UL — SIGNIFICANT CHANGE UP (ref 4.8–10.8)

## 2023-07-15 PROCEDURE — 82533 TOTAL CORTISOL: CPT

## 2023-07-15 PROCEDURE — 99285 EMERGENCY DEPT VISIT HI MDM: CPT

## 2023-07-15 PROCEDURE — 85303 CLOT INHIBIT PROT C ACTIVITY: CPT

## 2023-07-15 PROCEDURE — 93306 TTE W/DOPPLER COMPLETE: CPT

## 2023-07-15 PROCEDURE — 85384 FIBRINOGEN ACTIVITY: CPT

## 2023-07-15 PROCEDURE — 80053 COMPREHEN METABOLIC PANEL: CPT

## 2023-07-15 PROCEDURE — 85045 AUTOMATED RETICULOCYTE COUNT: CPT

## 2023-07-15 PROCEDURE — 83935 ASSAY OF URINE OSMOLALITY: CPT

## 2023-07-15 PROCEDURE — 83735 ASSAY OF MAGNESIUM: CPT

## 2023-07-15 PROCEDURE — 84300 ASSAY OF URINE SODIUM: CPT

## 2023-07-15 PROCEDURE — 85025 COMPLETE CBC W/AUTO DIFF WBC: CPT

## 2023-07-15 PROCEDURE — 85307 ASSAY ACTIVATED PROTEIN C: CPT

## 2023-07-15 PROCEDURE — 85379 FIBRIN DEGRADATION QUANT: CPT

## 2023-07-15 PROCEDURE — 80048 BASIC METABOLIC PNL TOTAL CA: CPT

## 2023-07-15 PROCEDURE — 85300 ANTITHROMBIN III ACTIVITY: CPT

## 2023-07-15 PROCEDURE — 36415 COLL VENOUS BLD VENIPUNCTURE: CPT

## 2023-07-15 PROCEDURE — 85730 THROMBOPLASTIN TIME PARTIAL: CPT

## 2023-07-15 PROCEDURE — 86900 BLOOD TYPING SEROLOGIC ABO: CPT

## 2023-07-15 PROCEDURE — 86850 RBC ANTIBODY SCREEN: CPT

## 2023-07-15 PROCEDURE — 93010 ELECTROCARDIOGRAM REPORT: CPT

## 2023-07-15 PROCEDURE — 84550 ASSAY OF BLOOD/URIC ACID: CPT

## 2023-07-15 PROCEDURE — 86146 BETA-2 GLYCOPROTEIN ANTIBODY: CPT

## 2023-07-15 PROCEDURE — 84443 ASSAY THYROID STIM HORMONE: CPT

## 2023-07-15 PROCEDURE — 86147 CARDIOLIPIN ANTIBODY EA IG: CPT

## 2023-07-15 PROCEDURE — 85027 COMPLETE CBC AUTOMATED: CPT

## 2023-07-15 PROCEDURE — 86901 BLOOD TYPING SEROLOGIC RH(D): CPT

## 2023-07-15 PROCEDURE — 83090 ASSAY OF HOMOCYSTEINE: CPT

## 2023-07-15 PROCEDURE — 85301 ANTITHROMBIN III ANTIGEN: CPT

## 2023-07-15 PROCEDURE — 85610 PROTHROMBIN TIME: CPT

## 2023-07-15 PROCEDURE — 83930 ASSAY OF BLOOD OSMOLALITY: CPT

## 2023-07-15 PROCEDURE — 82570 ASSAY OF URINE CREATININE: CPT

## 2023-07-15 PROCEDURE — 85220 BLOOC CLOT FACTOR V TEST: CPT

## 2023-07-15 RX ORDER — ENOXAPARIN SODIUM 100 MG/ML
90 INJECTION SUBCUTANEOUS EVERY 12 HOURS
Refills: 0 | Status: DISCONTINUED | OUTPATIENT
Start: 2023-07-15 | End: 2023-07-19

## 2023-07-15 RX ADMIN — ENOXAPARIN SODIUM 90 MILLIGRAM(S): 100 INJECTION SUBCUTANEOUS at 21:41

## 2023-07-15 NOTE — CONSULT NOTE ADULT - SUBJECTIVE AND OBJECTIVE BOX
PGY2 Note  Chief Complaint: SOB, abdominal pain    HPI: 33 yo , presents for abdominal pain and shortness of breath following egg retrieval procedure on 23. Following procedure pt noted to have abdominal pain which slowly increased. She presented to [facility] office and underwent TV paracentesis with drainage of fluid. Per PMD, US at that time noted 10cm ovaries with pelvic ascites 2/2 to OHSS. Patient advised about expected symptoms of abdominal pain and SOB, encouraged to hydrate and drink electrolytes. Pt reports today her pain has increased, with additional localization to LLQ and right side. Also reports SOB, worse with ambulation. Reports mild nausea and constipation. Denies CP, fever, vomiting, urinary symptoms, vaginal bleeding or discharge. She reports decreased appetite, however tolerating PO fluid and soup. Pt has a h/o provoked DVT following compression of left Iliac by uterine fibroids in .     Denies the following: constitutional symptoms, visual symptoms, cardiovascular symptoms, respiratory symptoms, GI symptoms, musculoskeletal symptoms, skin symptoms, neurologic symptoms, hematologic symptoms, allergic symptoms, psychiatric symptoms  Except any pertinent positives listed.     Ob/Gyn History:                     SAB x1  H/o fibroids, s/p myomectomy x2. ,  (in [place])  Denies history of ovarian cysts,  abnormal paps, or STIs    Home Medications:  prenatal vitamin: 1 tab(s) orally once a day (2021 07:10)    Allergies  No Known Allergies  Intolerances      PAST MEDICAL & SURGICAL HISTORY:  Fibroids  S/P dilation and curettage  H/O myomectomy    FAMILY HISTORY:  FH: hypertension    SOCIAL HISTORY: Denies cigarette use, alcohol use, or illicit drug use    Vital Signs Last 24 Hrs  T(F): 97.4 (15 Jul 2023 16:42), Max: 97.4 (15 Jul 2023 16:42)  HR: 98 (15 Jul 2023 19:11) (98 - 121)  BP: 111/62 (15 Jul 2023 19:11) (110/74 - 116/62)  RR: 23 (15 Jul 2023 18:11) (23 - 26)  Height (cm): 167.6 (07-15-23 @ 16:42)  Weight (kg): 86.2 (07-15-23 @ 16:42)  BMI (kg/m2): 30.7 (07-15-23 @ 16:42)  BSA (m2): 1.96 (07-15-23 @ 16:42)    General Appearance - AAOx3, NAD  Heart - tachycardia  Lung - CTA Bilaterally  Abdomen - mildly tense, tympanic, tender diffusely, increased in LLQ, no rebound, no rigidity, voluntary guarding, no fluid wave.     GYN/Pelvis:  External - normal   Internal - no bleeding, cervix closed, no CMT  Uterus - non palpable, tender   Adnexa- enlarged, tender bl    Meds:     Height (cm): 167.6 (07-15-23 @ 16:42)  Weight (kg): 86.2 (07-15-23 @ 16:42)  BMI (kg/m2): 30.7 (07-15-23 @ 16:42)  BSA (m2): 1.96 (07-15-23 @ 16:42)    LABS:                        14.7   10.69 )-----------( 281      ( 15 Jul 2023 18:07 )             44.2       ABO RH Interpretation: A POS (07-15-23 @ 18:07)  Antibody Screen: NEG (07-15-23 @ 18:07)    07-15    127<L>  |  95<L>  |  8<L>  ----------------------------<  86  4.9   |  19  |  1.0    Ca    8.8      15 Jul 2023 18:07  Phos  3.3     07-15  Mg     2.3     07-15    TPro  5.9<L>  /  Alb  3.6  /  TBili  0.5  /  DBili  x   /  AST  20  /  ALT  11  /  AlkPhos  53  07-15    PT/INR - ( 15 Jul 2023 18:07 )   PT: 12.00 sec;   INR: 1.05 ratio         PTT - ( 15 Jul 2023 18:07 )  PTT:31.1 sec  Urinalysis Basic - ( 15 Jul 2023 18:07 )    Color: x / Appearance: x / SG: x / pH: x  Gluc: 86 mg/dL / Ketone: x  / Bili: x / Urobili: x   Blood: x / Protein: x / Nitrite: x   Leuk Esterase: x / RBC: x / WBC x   Sq Epi: x / Non Sq Epi: x / Bacteria: x    RADIOLOGY & ADDITIONAL STUDIES:  CXR pending PGY2 Note  Chief Complaint: SOB, abdominal pain    HPI: 31 yo , presents for abdominal pain and shortness of breath following egg retrieval procedure on 23. Following procedure pt noted to have abdominal pain which slowly increased. She presented to [facility] office and underwent TV paracentesis with drainage of fluid. Per PMD, US at that time noted 10cm ovaries with pelvic ascites 2/2 to OHSS. Patient advised about expected symptoms of abdominal pain and SOB, encouraged to hydrate and drink electrolytes. Pt reports today her pain has increased, with additional localization to LLQ and right side. Also reports SOB, worse with ambulation. Reports mild nausea and constipation. Denies CP, fever, vomiting, urinary symptoms, vaginal bleeding or discharge. She reports decreased appetite, however tolerating PO fluid and soup. Pt has a h/o provoked DVT following compression of left Iliac by uterine fibroids in .     Denies the following: constitutional symptoms, visual symptoms, cardiovascular symptoms, respiratory symptoms, GI symptoms, musculoskeletal symptoms, skin symptoms, neurologic symptoms, hematologic symptoms, allergic symptoms, psychiatric symptoms  Except any pertinent positives listed.     Ob/Gyn History:                     SAB x1  H/o fibroids, s/p myomectomy x2. ,  (in [place])  Denies history of ovarian cysts,  abnormal paps, or STIs    Home Medications:  prenatal vitamin: 1 tab(s) orally once a day (2021 07:10)    Allergies  No Known Allergies  Intolerances      PAST MEDICAL & SURGICAL HISTORY:  Fibroids  S/P dilation and curettage  H/O myomectomy    FAMILY HISTORY:  FH: hypertension    SOCIAL HISTORY: Denies cigarette use, alcohol use, or illicit drug use    Vital Signs Last 24 Hrs  T(F): 97.4 (15 Jul 2023 16:42), Max: 97.4 (15 Jul 2023 16:42)  HR: 98 (15 Jul 2023 19:11) (98 - 121)  BP: 111/62 (15 Jul 2023 19:11) (110/74 - 116/62)  RR: 23 (15 Jul 2023 18:11) (23 - 26)  Height (cm): 167.6 (07-15-23 @ 16:42)  Weight (kg): 86.2 (07-15-23 @ 16:42)  BMI (kg/m2): 30.7 (07-15-23 @ 16:42)  BSA (m2): 1.96 (07-15-23 @ 16:42)    General Appearance - AAOx3, NAD  Heart - tachycardia  Lung - CTA Bilaterally  Abdomen - mildly tense, tympanic, tender diffusely, increased in LLQ, no rebound, no rigidity, voluntary guarding, no fluid wave.     GYN/Pelvis:  External - normal   Internal - no bleeding, cervix closed, no CMT  Uterus - non palpable, tender   Adnexa- enlarged, tender bl    Meds:     Height (cm): 167.6 (07-15-23 @ 16:42)  Weight (kg): 86.2 (07-15-23 @ 16:42)  BMI (kg/m2): 30.7 (07-15-23 @ 16:42)  BSA (m2): 1.96 (07-15-23 @ 16:42)    LABS:                        14.7   10.69 )-----------( 281      ( 15 Jul 2023 18:07 )             44.2       ABO RH Interpretation: A POS (07-15-23 @ 18:07)  Antibody Screen: NEG (07-15-23 @ 18:07)    07-15    127<L>  |  95<L>  |  8<L>  ----------------------------<  86  4.9   |  19  |  1.0    Ca    8.8      15 Jul 2023 18:07  Phos  3.3     07-15  Mg     2.3     07-15    TPro  5.9<L>  /  Alb  3.6  /  TBili  0.5  /  DBili  x   /  AST  20  /  ALT  11  /  AlkPhos  53  07-15    PT/INR - ( 15 Jul 2023 18:07 )   PT: 12.00 sec;   INR: 1.05 ratio         PTT - ( 15 Jul 2023 18:07 )  PTT:31.1 sec  Urinalysis Basic - ( 15 Jul 2023 18:07 )    Color: x / Appearance: x / SG: x / pH: x  Gluc: 86 mg/dL / Ketone: x  / Bili: x / Urobili: x   Blood: x / Protein: x / Nitrite: x   Leuk Esterase: x / RBC: x / WBC x   Sq Epi: x / Non Sq Epi: x / Bacteria: x    RADIOLOGY & ADDITIONAL STUDIES:  < from: CT Abdomen and Pelvis w/ IV Cont (07.15.23 @ 20:55) >    PROCEDURE DATE:  07/15/2023          INTERPRETATION:  CLINICAL HISTORY / REASON FOR EXAM:Chest and abdominal   pain    TECHNIQUE: Contiguous axial CT images were obtained from the thoracic   inlet to the lung bases 65 mL intravenous contrast using a CTA pulmonary   embolism protocol. The patient's abdomen from the diaphragm to the pubic   symphysis was then scanned using 30 mL Omnipaque 350 intravenous   contrast. Oral contrast was not administered. Coronal, sagittal and   3D/MIP reformatted images are also submitted.    COMPARISON CT: CT chest, abdomen and pelvis from 2021    OTHER STUDIES USED FOR CORRELATION: Ultrasound pelvis from July 15, 2023      FINDINGS:    CHEST:    PULMONARY EMBOLUS: Thrombus within the distal right main pulmonary   artery. Additional thrombi within the segmental and subsegmental branches  of the right upper and lower lobes. Thrombus within the left upper and   lower lobe segmental subsegmental branches. No CTA evidence of right   heart strain (LV / RV ratio > 1.    LUNGS, PLEURA, AND AIRWAYS: Right pleural effusion. No pneumothorax.  Central airways patent.    MEDIASTINUM/THORACIC NODES: No mediastinal or axillary lymphadenopathy.    HEART/GREAT VESSELS: No pericardial effusion. Heart size unremarkable. No   aneurysmal dilation of the thoracic aorta.      ABDOMEN/PELVIS:    HEPATOBILIARY: Unremarkable.    SPLEEN: Unremarkable.    PANCREAS: Unremarkable.    ADRENAL GLANDS: Unremarkable.    KIDNEYS: Symmetric enhancement bilaterally. No evidence of   hydronephrosis. Right upper pole calyceal diverticulum versus cyst.    ABDOMINOPELVIC NODES: Unremarkable.    PELVIC ORGANS: Markedly enlarged bilateral ovaries, better characterized   on recent pelvic ultrasound. Enlarged, fibroid uterus.    PERITONEUM/MESENTERY/BOWEL: Lower abdominal ascites. No obstruction or   intraperitoneal free air.    BONES/SOFT TISSUES: No acute osseous abnormality. Fat-containing   umbilical hernia.    VASCULAR: Left common iliac venous stent.      IMPRESSION:    CHEST:    Acute bilateral pulmonary emboli. Thrombus within the distal right main   pulmonary artery with additional smaller thrombi within the right upper   and lower lobe segmental and subsegmental branches. Left-sided thrombus   within the left upper and lower lobe segmental and subsegmental branches.   No CTA evidence of right heart strain.    Right pleural effusion.    ABDOMEN/PELVIS:    Markedly enlarged bilateral ovaries, which can be seen with ovarian   hyperstimulation syndrome.    Small moderate volume of intra-abdominal ascites within the lower pelvis.

## 2023-07-15 NOTE — ED PROVIDER NOTE - CLINICAL SUMMARY MEDICAL DECISION MAKING FREE TEXT BOX
Labs and EKG were ordered and reviewed.  Imaging was ordered and reviewed by me.  Appropriate medications for patient's presenting complaints were ordered and effects were reassessed.  Patient's records (prior hospital, ED visit, and/or nursing home notes if available) were reviewed.  Additional history was obtained from EMS, family, and/or PCP (where available).  Escalation to admission/observation was considered.  Patient requires inpatient hospitalization - monitored setting.  Patient with pulmonary embolism and OHS.   no right heart strain.  No hemodynamic compromise.  Anticoagulated and admitted.

## 2023-07-15 NOTE — ED ADULT TRIAGE NOTE - CHIEF COMPLAINT QUOTE
pt reports shortness of breath and abdominal pain to her LLQ and RUQ. pt had an egg retrieval procedure on Tuesday. states she went to the clinic yesterday and they withdrew some fluid from her abdomen

## 2023-07-15 NOTE — ED PROVIDER NOTE - ATTENDING APP SHARED VISIT CONTRIBUTION OF CARE
33 yo f with fibroids, presents with c/o worsening abd bloating and pain.  pt also with sob and weakness.  pt denies cp.  pt says had egg retrieval with dr. markham 4 days ago and retrieved 13 eggs. pt says developed bloating and sob later that day and worsened.  pt says she went back to the office and had "draining" of fluid with TVUS, with fluid and blood.  pt denies fever or chills.  exam: nad, ncat, +pallor, perrl, eomi, mmm, tachycardic, ctab, abd soft, distended and ttp diffusely, aox3, no calf tenderness, mild pitting edema imp: pt with recent egg retrieval, bloating, abd tenderness and POCUS shows diffuse free fluid, will cta chest r/o pe, ct abd, gyn consult

## 2023-07-15 NOTE — CONSULT NOTE ADULT - ASSESSMENT
A/P: 31 yo with abdominal pain, ascites, SOB, hyponatremia 2/2 to moderate to severe OHSS,   -  INCOMPLETE    Dr. Berry and Dr. Umaña aware A/P: 33 yo with severe OHSS, hyponatremia, and bilateral PEs,   -recommend admission to medicine for management of thromboembolism  -correct hyponatremia  -recommend PO hydration with electrolytes for tx OHSS  -recommend bowel regiment, pt reporting constipation  -pain management PRN  -recommend thrombophilia workup  -GYN to follow, please contact us with any questions [phone]    Dr. Berry and Dr. Umaña aware A/P: 33 yo with severe OHSS, hyponatremia, and bilateral PEs,   -recommend admission to medicine for management of thromboembolism  -correct hyponatremia  -recommend PO hydration with electrolytes for tx OHSS  -recommend monitor urine output  -recommend bowel regiment, pt reporting constipation  -pain management PRN  -recommend thrombophilia workup  -GYN to follow, please contact us with any questions [phone]    Dr. Berry and Dr. Umaña aware

## 2023-07-15 NOTE — ED PROVIDER NOTE - NS ED ROS FT
Constitutional: no fever, chills, no recent weight loss, change in appetite or malaise  Eyes: no redness/discharge/pain/vision changes  ENT: no rhinorrhea/ear pain/sore throat  Cardiac: No chest pain, edema.  Respiratory: No cough or respiratory distress  GI: No nausea, vomiting, diarrhea   : No dysuria, frequency, urgency or hematuria  MS: no pain to back or extremities, no loss of ROM, no weakness  Neuro: No headache or weakness. No LOC.  Skin: No skin rash.  Except as documented in the HPI, all other systems are negative.

## 2023-07-15 NOTE — ED ADULT NURSE NOTE - NSFALLRISKINTERV_ED_ALL_ED

## 2023-07-15 NOTE — ED PROVIDER NOTE - PROGRESS NOTE DETAILS
pt concern for ohss v. perf v. pe, seen by gyn, requested upgrading to crit area.  signed out to dr. sultana

## 2023-07-15 NOTE — ED PROVIDER NOTE - PHYSICAL EXAMINATION
CONSTITUTIONAL: Well-appearing; well-nourished; in no apparent distress.   NECK: Supple; non-tender; no cervical lymphadenopathy.   CARDIOVASCULAR: tachy, otherwise regular rhythm   RESPIRATORY: Normal chest excursion with respiration; breath sounds clear and equal bilaterally; no wheezes, rhonchi, or rales.  GI/: abd ttp and diffuse distention, pelvic deferred to gyn  MS: No evidence of trauma or deformity. Normal ROM in all four extremities; non-tender to palpation; distal pulses are normal.   SKIN: pale, otherwise warm; dry  NEURO/PSYCH: A & O x 4; grossly unremarkable. mood and manner are appropriate.

## 2023-07-15 NOTE — ED PROVIDER NOTE - CARE PLAN
1 Principal Discharge DX:	Pulmonary embolism  Secondary Diagnosis:	Ovarian hyperstimulation syndrome

## 2023-07-15 NOTE — ED PROVIDER NOTE - NS ED ATTENDING STATEMENT MOD
This was a shared visit with the LINDA. I reviewed and verified the documentation and independently performed the documented:
EMT / Paramedic

## 2023-07-15 NOTE — ED PROVIDER NOTE - OBJECTIVE STATEMENT
pt with pmhx infertility and fibroids, presents to ED c/o abd pain, distention and SOB since Tuesday when she underwent egg retrieval with . went to their office the following day and had in office TV paracentesis where pt sts she had 300cc drained. Denies fever/chill/HA/dizziness/chest pain/palpitation/n/v/d/ black stool/bloody stool/urinary sxs

## 2023-07-16 DIAGNOSIS — I26.99 OTHER PULMONARY EMBOLISM WITHOUT ACUTE COR PULMONALE: ICD-10-CM

## 2023-07-16 LAB
ALBUMIN SERPL ELPH-MCNC: 3.4 G/DL — LOW (ref 3.5–5.2)
ALP SERPL-CCNC: 48 U/L — SIGNIFICANT CHANGE UP (ref 30–115)
ALT FLD-CCNC: 11 U/L — SIGNIFICANT CHANGE UP (ref 0–41)
ANION GAP SERPL CALC-SCNC: 12 MMOL/L — SIGNIFICANT CHANGE UP (ref 7–14)
AST SERPL-CCNC: 21 U/L — SIGNIFICANT CHANGE UP (ref 0–41)
BASOPHILS # BLD AUTO: 0.03 K/UL — SIGNIFICANT CHANGE UP (ref 0–0.2)
BASOPHILS NFR BLD AUTO: 0.3 % — SIGNIFICANT CHANGE UP (ref 0–1)
BILIRUB SERPL-MCNC: 0.6 MG/DL — SIGNIFICANT CHANGE UP (ref 0.2–1.2)
BUN SERPL-MCNC: 13 MG/DL — SIGNIFICANT CHANGE UP (ref 10–20)
CALCIUM SERPL-MCNC: 8.6 MG/DL — SIGNIFICANT CHANGE UP (ref 8.4–10.5)
CHLORIDE SERPL-SCNC: 97 MMOL/L — LOW (ref 98–110)
CO2 SERPL-SCNC: 17 MMOL/L — SIGNIFICANT CHANGE UP (ref 17–32)
CREAT SERPL-MCNC: 1 MG/DL — SIGNIFICANT CHANGE UP (ref 0.7–1.5)
EGFR: 76 ML/MIN/1.73M2 — SIGNIFICANT CHANGE UP
EOSINOPHIL # BLD AUTO: 0.03 K/UL — SIGNIFICANT CHANGE UP (ref 0–0.7)
EOSINOPHIL NFR BLD AUTO: 0.3 % — SIGNIFICANT CHANGE UP (ref 0–8)
GLUCOSE SERPL-MCNC: 86 MG/DL — SIGNIFICANT CHANGE UP (ref 70–99)
HCT VFR BLD CALC: 43.4 % — SIGNIFICANT CHANGE UP (ref 37–47)
HGB BLD-MCNC: 14.3 G/DL — SIGNIFICANT CHANGE UP (ref 12–16)
IMM GRANULOCYTES NFR BLD AUTO: 0.4 % — HIGH (ref 0.1–0.3)
LYMPHOCYTES # BLD AUTO: 25.6 % — SIGNIFICANT CHANGE UP (ref 20.5–51.1)
LYMPHOCYTES # BLD AUTO: 3 K/UL — SIGNIFICANT CHANGE UP (ref 1.2–3.4)
MAGNESIUM SERPL-MCNC: 2.4 MG/DL — SIGNIFICANT CHANGE UP (ref 1.8–2.4)
MCHC RBC-ENTMCNC: 26.8 PG — LOW (ref 27–31)
MCHC RBC-ENTMCNC: 32.9 G/DL — SIGNIFICANT CHANGE UP (ref 32–37)
MCV RBC AUTO: 81.3 FL — SIGNIFICANT CHANGE UP (ref 81–99)
MONOCYTES # BLD AUTO: 1.12 K/UL — HIGH (ref 0.1–0.6)
MONOCYTES NFR BLD AUTO: 9.5 % — HIGH (ref 1.7–9.3)
NEUTROPHILS # BLD AUTO: 7.5 K/UL — HIGH (ref 1.4–6.5)
NEUTROPHILS NFR BLD AUTO: 63.9 % — SIGNIFICANT CHANGE UP (ref 42.2–75.2)
NRBC # BLD: 0 /100 WBCS — SIGNIFICANT CHANGE UP (ref 0–0)
OSMOLALITY SERPL: 273 MOS/KG — LOW (ref 275–300)
PLATELET # BLD AUTO: 258 K/UL — SIGNIFICANT CHANGE UP (ref 130–400)
PMV BLD: 12.3 FL — HIGH (ref 7.4–10.4)
POTASSIUM SERPL-MCNC: 5.2 MMOL/L — HIGH (ref 3.5–5)
POTASSIUM SERPL-SCNC: 5.2 MMOL/L — HIGH (ref 3.5–5)
PROT SERPL-MCNC: 5.3 G/DL — LOW (ref 6–8)
RBC # BLD: 5.34 M/UL — SIGNIFICANT CHANGE UP (ref 4.2–5.4)
RBC # FLD: 12.5 % — SIGNIFICANT CHANGE UP (ref 11.5–14.5)
SODIUM SERPL-SCNC: 126 MMOL/L — LOW (ref 135–146)
WBC # BLD: 11.73 K/UL — HIGH (ref 4.8–10.8)
WBC # FLD AUTO: 11.73 K/UL — HIGH (ref 4.8–10.8)

## 2023-07-16 PROCEDURE — 99222 1ST HOSP IP/OBS MODERATE 55: CPT

## 2023-07-16 RX ORDER — POLYETHYLENE GLYCOL 3350 17 G/17G
17 POWDER, FOR SOLUTION ORAL
Refills: 0 | Status: DISCONTINUED | OUTPATIENT
Start: 2023-07-16 | End: 2023-07-19

## 2023-07-16 RX ORDER — LANOLIN ALCOHOL/MO/W.PET/CERES
3 CREAM (GRAM) TOPICAL AT BEDTIME
Refills: 0 | Status: DISCONTINUED | OUTPATIENT
Start: 2023-07-16 | End: 2023-07-19

## 2023-07-16 RX ORDER — ACETAMINOPHEN 500 MG
650 TABLET ORAL EVERY 6 HOURS
Refills: 0 | Status: DISCONTINUED | OUTPATIENT
Start: 2023-07-16 | End: 2023-07-19

## 2023-07-16 RX ORDER — SENNA PLUS 8.6 MG/1
2 TABLET ORAL AT BEDTIME
Refills: 0 | Status: DISCONTINUED | OUTPATIENT
Start: 2023-07-16 | End: 2023-07-19

## 2023-07-16 RX ORDER — MORPHINE SULFATE 50 MG/1
2 CAPSULE, EXTENDED RELEASE ORAL EVERY 6 HOURS
Refills: 0 | Status: DISCONTINUED | OUTPATIENT
Start: 2023-07-16 | End: 2023-07-19

## 2023-07-16 RX ORDER — MORPHINE SULFATE 50 MG/1
2 CAPSULE, EXTENDED RELEASE ORAL ONCE
Refills: 0 | Status: DISCONTINUED | OUTPATIENT
Start: 2023-07-16 | End: 2023-07-16

## 2023-07-16 RX ORDER — ONDANSETRON 8 MG/1
4 TABLET, FILM COATED ORAL EVERY 8 HOURS
Refills: 0 | Status: DISCONTINUED | OUTPATIENT
Start: 2023-07-16 | End: 2023-07-19

## 2023-07-16 RX ADMIN — MORPHINE SULFATE 2 MILLIGRAM(S): 50 CAPSULE, EXTENDED RELEASE ORAL at 00:21

## 2023-07-16 RX ADMIN — ENOXAPARIN SODIUM 90 MILLIGRAM(S): 100 INJECTION SUBCUTANEOUS at 05:38

## 2023-07-16 RX ADMIN — MORPHINE SULFATE 2 MILLIGRAM(S): 50 CAPSULE, EXTENDED RELEASE ORAL at 09:59

## 2023-07-16 RX ADMIN — MORPHINE SULFATE 2 MILLIGRAM(S): 50 CAPSULE, EXTENDED RELEASE ORAL at 23:09

## 2023-07-16 RX ADMIN — ENOXAPARIN SODIUM 90 MILLIGRAM(S): 100 INJECTION SUBCUTANEOUS at 17:16

## 2023-07-16 RX ADMIN — ONDANSETRON 4 MILLIGRAM(S): 8 TABLET, FILM COATED ORAL at 00:34

## 2023-07-16 RX ADMIN — ONDANSETRON 4 MILLIGRAM(S): 8 TABLET, FILM COATED ORAL at 09:59

## 2023-07-16 RX ADMIN — ONDANSETRON 4 MILLIGRAM(S): 8 TABLET, FILM COATED ORAL at 21:53

## 2023-07-16 RX ADMIN — MORPHINE SULFATE 2 MILLIGRAM(S): 50 CAPSULE, EXTENDED RELEASE ORAL at 17:00

## 2023-07-16 NOTE — PROGRESS NOTE ADULT - ASSESSMENT
A/P: 31 yo with severe OHSS s/p egg retrieval on 7/11, with hyponatremia and bilateral PEs, currently clinically and hemodynamically stable    -recommend correction of hyponatremia  -recommend PO hydration with electrolytes for tx OHSS  -recommend monitor urine output  -pain management PRN  -recommend thrombophilia workup  -GYN to follow, please contact us with any questions [phone]    Dr. Umaña to be made aware

## 2023-07-16 NOTE — H&P ADULT - ASSESSMENT
32F PMHx infertility and uterine fibroids, presents to ED c/o abd pain, distention and SOB x5 days. 32F PMHx h/o provoked LLE DVT 2/2 compression of left Iliac vein by uterine fibroids in 2021 (s/p stenting and previously on eliquis), h/o infertility and uterine fibroids presents for abdominal pain and shortness of breath following egg retrieval procedure on 7/11/23. Found to have multiple PEs. Admitted to medicine for PE and hyponatremia management.    #Acute b/l PEs  #h/o Provoked LLE DVT 2/2 compression of left iliac vein by uterine fibroid (2021)  - previously on Eliquis no longer taking  - s/p thrombectomy of LLE and s/p stenting by vascular at [facility]  - CTA of Chest showed b/l distal main, segmental and sub-segmental PEs  - PEs possibly 2/2 IVF/Egg retrieval  - started on Lovenox AC  - monitor respiratory status  - transition to eliquis when appropriate w/ loading  - can follow up with hematology outpatient    #Hyponatremia  - Na 126 on admission  - f/u serum osm  - f/u urine studies    #Right Pleural Effusion  - CTA of Chest showed right sided pleural effusion  - satting 97% on RA, not in respiratory distress    #Severe Ovarian Hyperstimulation Syndrome  #Small to Moderate Volume Abd/Pelvic Ascites  #Infertility s/p egg retrieval (7/23)  #Uterine Fibroids  - TVUS showed: Markedly enlarged bilateral ovaries. Vascular flow demonstrated to the ovaries. Small to moderate free pelvic fluid. Uterine fibroids.  - tenderness to palpation in b/l lower abd  - c/w Zofran for nausea  - c/w Morphine for Pain  - OBGYN following    #Constipation  - c/w senna and miralax    #DVT ppx: Lovenox AC  #GI ppx: none  #Diet: Regular  #Activity: AAT  #Code Status: Full Code  #Dispo: from home, acute 32F PMHx h/o provoked LLE DVT 2/2 compression of left Iliac vein by uterine fibroids in 2021 (s/p stenting and previously on eliquis), h/o infertility and uterine fibroids presents for abdominal pain and shortness of breath following egg retrieval procedure on 7/11/23. Found to have multiple PEs. Admitted to medicine for PE and hyponatremia management.    #Acute b/l PEs  #h/o Provoked LLE DVT 2/2 compression of left iliac vein by uterine fibroid (2021)  - previously on Eliquis no longer taking  - s/p thrombectomy of LLE and s/p stenting by vascular at [facility]  - CTA of Chest showed b/l distal main, segmental and sub-segmental PEs  - PEs possibly 2/2 IVF/Egg retrieval  - started on Lovenox AC  - monitor respiratory status  - transition to eliquis when appropriate w/ loading  - can follow up with hematology outpatient    #Moderate Hyponatremia  - Na 126 on admission  - f/u serum osm  - f/u urine studies    #Right Pleural Effusion  - CTA of Chest showed right sided pleural effusion  - satting 97% on RA, not in respiratory distress    #Severe Ovarian Hyperstimulation Syndrome  #Small to Moderate Volume Abd/Pelvic Ascites  #Infertility s/p egg retrieval (7/23)  #Uterine Fibroids  - TVUS showed: Markedly enlarged bilateral ovaries. Vascular flow demonstrated to the ovaries. Small to moderate free pelvic fluid. Uterine fibroids.  - tenderness to palpation in b/l lower abd  - c/w Zofran for nausea  - c/w Morphine for Pain  - OBGYN following    #Constipation  - c/w senna and miralax    #DVT ppx: Lovenox AC  #GI ppx: none  #Diet: Regular  #Activity: AAT  #Code Status: Full Code  #Dispo: from home, acute 32F PMHx h/o provoked LLE DVT 2/2 compression of left Iliac vein by uterine fibroids in 2021 (s/p stenting and previously on eliquis), h/o infertility and uterine fibroids presents for abdominal pain and shortness of breath following egg retrieval procedure on 7/11/23. Found to have multiple PEs. Admitted to medicine for PE and hyponatremia management.    #Acute b/l PEs  #h/o Provoked LLE DVT 2/2 compression of left iliac vein by uterine fibroid (2021)  - previously on Eliquis no longer taking  - s/p thrombectomy of LLE and s/p stenting by vascular at [facility]  - CTA of Chest showed b/l distal main, segmental and sub-segmental PEs  - PEs possibly 2/2 IVF/Egg retrieval  - started on Lovenox AC  - monitor respiratory status  - transition to eliquis when appropriate w/ loading  - can follow up with hematology outpatient    #Moderate Hyponatremia  - Na 126 on admission  - f/u serum osm  - f/u urine studies    #Right Pleural Effusion  - CTA of Chest showed right sided pleural effusion  - satting 97% on RA, not in respiratory distress    #Severe Ovarian Hyperstimulation Syndrome  #Small to Moderate Volume Abd/Pelvic Ascites  #Infertility s/p egg retrieval (7/23)  #Uterine Fibroids  - TVUS showed: Markedly enlarged bilateral ovaries. Vascular flow demonstrated to the ovaries. Small to moderate free pelvic fluid. Uterine fibroids.  - tenderness to palpation in b/l lower abd  - c/w Zofran for nausea  - c/w Morphine 2mg IV q6hrs PRN for Pain  - holding home Oxycodone 5mg q6hrs PRN  - OBGYN following    #Constipation  - c/w senna and miralax    #DVT ppx: Lovenox AC  #GI ppx: none  #Diet: Regular  #Activity: AAT  #Code Status: Full Code  #Dispo: from home, acute 32F PMHx h/o provoked LLE DVT 2/2 compression of left Iliac vein by uterine fibroids in 2021 (s/p stenting and previously on eliquis), h/o infertility and uterine fibroids presents for abdominal pain and shortness of breath following egg retrieval procedure on 7/11/23. Found to have multiple PEs. Admitted to medicine for PE and hyponatremia management.    #Acute b/l PEs  #h/o Provoked LLE DVT 2/2 compression of left iliac vein by uterine fibroid (2021)  - previously on Eliquis no longer taking  - s/p thrombectomy of LLE and s/p stenting by vascular at [facility]  - CTA of Chest showed b/l distal main, segmental and sub-segmental PEs  - PEs possibly 2/2 IVF/Egg retrieval  - started on Lovenox AC  - monitor respiratory status  - f/u ECHO  - transition to eliquis when appropriate w/ loading  - can follow up with hematology outpatient    #Moderate Hyponatremia  - Na 126 on admission  - f/u serum osm  - f/u urine studies    #Right Pleural Effusion  - CTA of Chest showed right sided pleural effusion  - satting 97% on RA, not in respiratory distress    #Severe Ovarian Hyperstimulation Syndrome  #Small to Moderate Volume Abd/Pelvic Ascites  #Infertility s/p egg retrieval (7/23)  #Uterine Fibroids  - TVUS showed: Markedly enlarged bilateral ovaries. Vascular flow demonstrated to the ovaries. Small to moderate free pelvic fluid. Uterine fibroids.  - tenderness to palpation in b/l lower abd  - c/w Zofran for nausea  - c/w Morphine 2mg IV q6hrs PRN for Pain  - holding home Oxycodone 5mg q6hrs PRN  - OBGYN following    #Constipation  - c/w senna and miralax    #DVT ppx: Lovenox AC  #GI ppx: none  #Diet: Regular  #Activity: AAT  #Code Status: Full Code  #Dispo: from home, acute

## 2023-07-16 NOTE — H&P ADULT - HISTORY OF PRESENT ILLNESS
32F PMHx infertility and uterine fibroids, presents to ED c/o abd pain, distention and SOB x5 days. This occurred after she underwent egg retrieval with Dr. Angela, her OBGYN. She went to their office the following day and had a TV paracentesis where pt states she had 300cc drained. Denies headaches, fever, chills, chest pain, palpitation, n/v/d, bloody BMs, or urinary sxs. 32F PMHx h/o provoked LLE DVT 2/2 compression of left Iliac vein by uterine fibroids in 2021 (s/p stenting and previously on eliquis), h/o infertility and uterine fibroids presents for abdominal pain and shortness of breath following egg retrieval procedure on 7/11/23. Following procedure pt noted to have abdominal pain which slowly increased. She presented to [facility] office and underwent TV paracentesis with drainage of fluid. Per PMD, US at that time noted 10cm ovaries with pelvic ascites 2/2 to Ovarian Hyperstimulation Syndrome (OHSS). Patient advised about expected symptoms of abdominal pain and SOB, encouraged to hydrate and drink electrolytes. Pt reports today her pain has increased, with additional localization to LLQ and right side. Also reports SOB, worse with ambulation. Reports mild nausea and constipation. Denies headaches, CP, fever, chills, vomiting, urinary symptoms, vaginal bleeding or discharge.    Vitals: Temp 97.4, , /74, RR 26, SpO2 100% on RA    Labs: Sodium 126, serum preg +ve, trops -ve    Imaging:  CTA of Chest:  - Thrombus within the distal right main pulmonary artery.  - Additional thrombi within the segmental and subsegmental branches of the right upper and lower lobes.  - Thrombus within the left upper and lower lobe segmental subsegmental branches.  - No CTA evidence of right heart strain  - Right pleural effusion.  - Small moderate volume of intra-abdominal ascites within the lower pelvis.    In the ED:  - started on Lovenox AC  - OBGYN consulted  - Admitted to medicine for management of PEs and hyponatremia.

## 2023-07-16 NOTE — ED ADULT NURSE REASSESSMENT NOTE - NS ED NURSE REASSESS COMMENT FT1
Pt received from previous RN. Pt sleeping at this time. Pt on continuous cardiac monitoring. RR even and unlabored. Care ongoing.

## 2023-07-16 NOTE — H&P ADULT - ATTENDING COMMENTS
32F PMHx h/o provoked LLE DVT 2/2 compression of left Iliac vein by uterine fibroids in 2021 (s/p stenting and previously on eliquis), h/o infertility and uterine fibroids presents for abdominal pain and shortness of breath following egg retrieval procedure on 7/11/23. Found to have multiple PEs. Admitted to medicine for PE and hyponatremia management.      Acute B/L PE  Hyponaremia likely SIADH  Infertility s/p Egg retrieval    Plan    cont Lovenox  Check Urine /Serum Osm  Free water restriction to 1.2 L/day  hematology eval

## 2023-07-16 NOTE — ED ADULT NURSE REASSESSMENT NOTE - NS ED NURSE REASSESS COMMENT FT1
Pt. received from previous RN. Pt. lying on bed, breathing with ease on RA. A&O x4. 18g noted to the L AC; IV intact, no redness/swelling at site. Awaiting clean bed assignment.

## 2023-07-16 NOTE — H&P ADULT - NSHPLABSRESULTS_GEN_ALL_CORE
LABS:  cret                        14.7   10.69 )-----------( 281      ( 15 Jul 2023 18:07 )             44.2     07-15    127<L>  |  95<L>  |  8<L>  ----------------------------<  86  4.9   |  19  |  1.0    Ca    8.8      15 Jul 2023 18:07  Phos  3.3     07-15  Mg     2.3     07-15    TPro  5.9<L>  /  Alb  3.6  /  TBili  0.5  /  DBili  x   /  AST  20  /  ALT  11  /  AlkPhos  53  07-15    PT/INR - ( 15 Jul 2023 18:07 )   PT: 12.00 sec;   INR: 1.05 ratio         PTT - ( 15 Jul 2023 18:07 )  PTT:31.1 sec

## 2023-07-16 NOTE — H&P ADULT - NSHPPHYSICALEXAM_GEN_ALL_CORE
VITALS:   Vital Signs Last 24 Hrs  T(C): 36.3 (15 Jul 2023 16:42), Max: 36.3 (15 Jul 2023 16:42)  T(F): 97.4 (15 Jul 2023 16:42), Max: 97.4 (15 Jul 2023 16:42)  HR: 102 (15 Jul 2023 23:40) (98 - 121)  BP: 106/67 (15 Jul 2023 23:40) (106/67 - 116/62)  RR: 18 (15 Jul 2023 23:40) (18 - 26)  SpO2: 99% (15 Jul 2023 23:40) (97% - 100%)    Parameters below as of 15 Jul 2023 23:40  Patient On (Oxygen Delivery Method): room air      I&O's Summary    CAPILLARY BLOOD GLUCOSE          PHYSICAL EXAM:  General:   HEENT:   Neurology:   Respiratory:  CV:  Abdominal:  Extremities: VITALS:   Vital Signs Last 24 Hrs  T(C): 36.3 (15 Jul 2023 16:42), Max: 36.3 (15 Jul 2023 16:42)  T(F): 97.4 (15 Jul 2023 16:42), Max: 97.4 (15 Jul 2023 16:42)  HR: 102 (15 Jul 2023 23:40) (98 - 121)  BP: 106/67 (15 Jul 2023 23:40) (106/67 - 116/62)  RR: 18 (15 Jul 2023 23:40) (18 - 26)  SpO2: 99% (15 Jul 2023 23:40) (97% - 100%)    Parameters below as of 15 Jul 2023 23:40  Patient On (Oxygen Delivery Method): room air      I&O's Summary    CAPILLARY BLOOD GLUCOSE          PHYSICAL EXAM:  General: in mild distress  HEENT: NCAT  Neurology: A&Ox4  Respiratory: not in respiratory distress, clear to ascultation b/l, no wheezing or crackles  CV: tachycardic, normal rhythm, no murmurs appreciated  Abdominal: soft, nondistended, tender to palpation in right and left lower quadrants, +BS, no guarding  Extremities: no LE swelling or size discrepancy, no edema noted

## 2023-07-17 LAB
ALBUMIN SERPL ELPH-MCNC: 3.6 G/DL — SIGNIFICANT CHANGE UP (ref 3.5–5.2)
ALP SERPL-CCNC: 53 U/L — SIGNIFICANT CHANGE UP (ref 30–115)
ALT FLD-CCNC: 13 U/L — SIGNIFICANT CHANGE UP (ref 0–41)
ANION GAP SERPL CALC-SCNC: 12 MMOL/L — SIGNIFICANT CHANGE UP (ref 7–14)
AST SERPL-CCNC: 26 U/L — SIGNIFICANT CHANGE UP (ref 0–41)
BASOPHILS # BLD AUTO: 0.04 K/UL — SIGNIFICANT CHANGE UP (ref 0–0.2)
BASOPHILS NFR BLD AUTO: 0.4 % — SIGNIFICANT CHANGE UP (ref 0–1)
BILIRUB SERPL-MCNC: 0.5 MG/DL — SIGNIFICANT CHANGE UP (ref 0.2–1.2)
BUN SERPL-MCNC: 14 MG/DL — SIGNIFICANT CHANGE UP (ref 10–20)
CALCIUM SERPL-MCNC: 8.7 MG/DL — SIGNIFICANT CHANGE UP (ref 8.4–10.4)
CHLORIDE SERPL-SCNC: 99 MMOL/L — SIGNIFICANT CHANGE UP (ref 98–110)
CO2 SERPL-SCNC: 19 MMOL/L — SIGNIFICANT CHANGE UP (ref 17–32)
CREAT ?TM UR-MCNC: 96 MG/DL — SIGNIFICANT CHANGE UP
CREAT SERPL-MCNC: 0.9 MG/DL — SIGNIFICANT CHANGE UP (ref 0.7–1.5)
EGFR: 86 ML/MIN/1.73M2 — SIGNIFICANT CHANGE UP
EOSINOPHIL # BLD AUTO: 0.09 K/UL — SIGNIFICANT CHANGE UP (ref 0–0.7)
EOSINOPHIL NFR BLD AUTO: 0.9 % — SIGNIFICANT CHANGE UP (ref 0–8)
GLUCOSE SERPL-MCNC: 87 MG/DL — SIGNIFICANT CHANGE UP (ref 70–99)
HCT VFR BLD CALC: 44.2 % — SIGNIFICANT CHANGE UP (ref 37–47)
HGB BLD-MCNC: 14.6 G/DL — SIGNIFICANT CHANGE UP (ref 12–16)
IMM GRANULOCYTES NFR BLD AUTO: 0.6 % — HIGH (ref 0.1–0.3)
LYMPHOCYTES # BLD AUTO: 29.3 % — SIGNIFICANT CHANGE UP (ref 20.5–51.1)
LYMPHOCYTES # BLD AUTO: 3.02 K/UL — SIGNIFICANT CHANGE UP (ref 1.2–3.4)
MAGNESIUM SERPL-MCNC: 2.5 MG/DL — HIGH (ref 1.8–2.4)
MCHC RBC-ENTMCNC: 26.9 PG — LOW (ref 27–31)
MCHC RBC-ENTMCNC: 33 G/DL — SIGNIFICANT CHANGE UP (ref 32–37)
MCV RBC AUTO: 81.5 FL — SIGNIFICANT CHANGE UP (ref 81–99)
MONOCYTES # BLD AUTO: 1.03 K/UL — HIGH (ref 0.1–0.6)
MONOCYTES NFR BLD AUTO: 10 % — HIGH (ref 1.7–9.3)
NEUTROPHILS # BLD AUTO: 6.05 K/UL — SIGNIFICANT CHANGE UP (ref 1.4–6.5)
NEUTROPHILS NFR BLD AUTO: 58.8 % — SIGNIFICANT CHANGE UP (ref 42.2–75.2)
NRBC # BLD: 0 /100 WBCS — SIGNIFICANT CHANGE UP (ref 0–0)
OSMOLALITY UR: 404 MOS/KG — SIGNIFICANT CHANGE UP (ref 50–1200)
PLATELET # BLD AUTO: 283 K/UL — SIGNIFICANT CHANGE UP (ref 130–400)
PMV BLD: 12.1 FL — HIGH (ref 7.4–10.4)
POTASSIUM SERPL-MCNC: 5.1 MMOL/L — HIGH (ref 3.5–5)
POTASSIUM SERPL-SCNC: 5.1 MMOL/L — HIGH (ref 3.5–5)
PROT SERPL-MCNC: 5.9 G/DL — LOW (ref 6–8)
RBC # BLD: 5.42 M/UL — HIGH (ref 4.2–5.4)
RBC # FLD: 12.7 % — SIGNIFICANT CHANGE UP (ref 11.5–14.5)
SODIUM SERPL-SCNC: 130 MMOL/L — LOW (ref 135–146)
SODIUM UR-SCNC: 20 MMOL/L — SIGNIFICANT CHANGE UP
TSH SERPL-MCNC: 6.84 UIU/ML — HIGH (ref 0.27–4.2)
URATE SERPL-MCNC: 7.2 MG/DL — HIGH (ref 2.5–7)
WBC # BLD: 10.29 K/UL — SIGNIFICANT CHANGE UP (ref 4.8–10.8)
WBC # FLD AUTO: 10.29 K/UL — SIGNIFICANT CHANGE UP (ref 4.8–10.8)

## 2023-07-17 PROCEDURE — 99232 SBSQ HOSP IP/OBS MODERATE 35: CPT

## 2023-07-17 RX ADMIN — ONDANSETRON 4 MILLIGRAM(S): 8 TABLET, FILM COATED ORAL at 23:38

## 2023-07-17 RX ADMIN — ENOXAPARIN SODIUM 90 MILLIGRAM(S): 100 INJECTION SUBCUTANEOUS at 18:48

## 2023-07-17 RX ADMIN — Medication 650 MILLIGRAM(S): at 21:47

## 2023-07-17 RX ADMIN — ENOXAPARIN SODIUM 90 MILLIGRAM(S): 100 INJECTION SUBCUTANEOUS at 06:12

## 2023-07-17 RX ADMIN — Medication 3 MILLIGRAM(S): at 21:45

## 2023-07-17 RX ADMIN — Medication 650 MILLIGRAM(S): at 22:59

## 2023-07-17 NOTE — PROGRESS NOTE ADULT - ASSESSMENT
A/P: 31 yo with severe OHSS s/p egg retrieval on 7/11, with hyponatremia and bilateral PEs, currently clinically and hemodynamically stable    -recommend correction of hyponatremia and other electrolyte abnormalities  -recommend PO hydration with electrolytes for tx OHSS  -recommend monitor urine output  -pain management PRN, avoid NSAIDs if concerned for kidney function  -recommend thrombophilia workup  -GYN to follow, please contact us with any questions [phone]    Dr. Falcon and Dr. Umaña to be made aware

## 2023-07-17 NOTE — PATIENT PROFILE ADULT - FALL HARM RISK - UNIVERSAL INTERVENTIONS
Bed in lowest position, wheels locked, appropriate side rails in place/Call bell, personal items and telephone in reach/Instruct patient to call for assistance before getting out of bed or chair/Non-slip footwear when patient is out of bed/[place] to call system/Physically safe environment - no spills, clutter or unnecessary equipment/Purposeful Proactive Rounding/Room/bathroom lighting operational, light cord in reach

## 2023-07-17 NOTE — PATIENT PROFILE ADULT - NS PRO AD NO ADVANCE DIRECTIVE
Pt awake in bed, in no distress.  Denies any pain or SOB.  VSS.  Dressing to neck is loose, serousanginous drainage noted.  No further complaints.  Bed alarm on, call light within reach, and bed locked in lowest position.  Will continue to monitor and follow plan of care.     No
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
## 2023-07-17 NOTE — PROGRESS NOTE ADULT - ASSESSMENT
32F PMHx h/o provoked LLE DVT 2/2 compression of left Iliac vein by uterine fibroids in 2021 (s/p stenting and previously on eliquis), h/o infertility and uterine fibroids presents for abdominal pain and shortness of breath following egg retrieval procedure on 7/11/23. Found to have multiple PEs. Admitted to medicine for PE and hyponatremia management.      Assessment    Acute B/L PE  Hyponaremia likely SIADH  Infertility s/p Egg retrieval    Plan    cont Lovenox  Hypercoagulable W/U  ordered  Check Urine Osm and urine electrolytes  Serum cortisol , TSH in am   Free water restriction to 1.2 L/day  Repeat Labs in am  Out Pt Hematology f/u  GYN f/u

## 2023-07-18 DIAGNOSIS — H02.88A MEIBOMIAN GLAND DYSFUNCTION RIGHT EYE, UPPER AND LOWER EYELIDS: ICD-10-CM

## 2023-07-18 DIAGNOSIS — H17.9 UNSPECIFIED CORNEAL SCAR AND OPACITY: ICD-10-CM

## 2023-07-18 DIAGNOSIS — H16.229 KERATOCONJUNCTIVITIS SICCA, NOT SPECIFIED AS SJOGREN'S, UNSPECIFIED EYE: ICD-10-CM

## 2023-07-18 LAB
ANION GAP SERPL CALC-SCNC: 8 MMOL/L — SIGNIFICANT CHANGE UP (ref 7–14)
APCR PPP: 2.5 RATIO — SIGNIFICANT CHANGE UP
APTT BLD: 35.1 SEC — SIGNIFICANT CHANGE UP (ref 27–39.2)
AT III ACT/NOR PPP CHRO: 83 % — LOW (ref 85–135)
AT III AG PPP IA-MCNC: 20 MG/DL — SIGNIFICANT CHANGE UP (ref 19–31)
B2 GLYCOPROT1 AB SER QL: NEGATIVE — SIGNIFICANT CHANGE UP
BUN SERPL-MCNC: 11 MG/DL — SIGNIFICANT CHANGE UP (ref 10–20)
CALCIUM SERPL-MCNC: 8.3 MG/DL — LOW (ref 8.4–10.5)
CARDIOLIPIN AB SER-ACNC: NEGATIVE — SIGNIFICANT CHANGE UP
CHLORIDE SERPL-SCNC: 101 MMOL/L — SIGNIFICANT CHANGE UP (ref 98–110)
CO2 SERPL-SCNC: 24 MMOL/L — SIGNIFICANT CHANGE UP (ref 17–32)
CORTIS AM PEAK SERPL-MCNC: 18.4 UG/DL — SIGNIFICANT CHANGE UP (ref 6–18.4)
CREAT SERPL-MCNC: 0.7 MG/DL — SIGNIFICANT CHANGE UP (ref 0.7–1.5)
D DIMER BLD IA.RAPID-MCNC: 2767 NG/ML DDU — HIGH
EGFR: 116 ML/MIN/1.73M2 — SIGNIFICANT CHANGE UP
FACT V ACT/NOR PPP: 111 % — SIGNIFICANT CHANGE UP (ref 50–150)
FIBRINOGEN PPP-MCNC: >700 MG/DL — HIGH (ref 204.4–570.6)
GLUCOSE SERPL-MCNC: 80 MG/DL — SIGNIFICANT CHANGE UP (ref 70–99)
HCT VFR BLD CALC: 33.4 % — LOW (ref 37–47)
HCT VFR BLD CALC: 34.4 % — LOW (ref 37–47)
HCT VFR BLD CALC: 36.1 % — LOW (ref 37–47)
HCYS SERPL-MCNC: <3 UMOL/L — SIGNIFICANT CHANGE UP
HGB BLD-MCNC: 11.2 G/DL — LOW (ref 12–16)
HGB BLD-MCNC: 11.4 G/DL — LOW (ref 12–16)
HGB BLD-MCNC: 11.9 G/DL — LOW (ref 12–16)
INR BLD: 0.94 RATIO — SIGNIFICANT CHANGE UP (ref 0.65–1.3)
MCHC RBC-ENTMCNC: 26.8 PG — LOW (ref 27–31)
MCHC RBC-ENTMCNC: 26.8 PG — LOW (ref 27–31)
MCHC RBC-ENTMCNC: 27.4 PG — SIGNIFICANT CHANGE UP (ref 27–31)
MCHC RBC-ENTMCNC: 33 G/DL — SIGNIFICANT CHANGE UP (ref 32–37)
MCHC RBC-ENTMCNC: 33.1 G/DL — SIGNIFICANT CHANGE UP (ref 32–37)
MCHC RBC-ENTMCNC: 33.5 G/DL — SIGNIFICANT CHANGE UP (ref 32–37)
MCV RBC AUTO: 80.8 FL — LOW (ref 81–99)
MCV RBC AUTO: 81.3 FL — SIGNIFICANT CHANGE UP (ref 81–99)
MCV RBC AUTO: 81.7 FL — SIGNIFICANT CHANGE UP (ref 81–99)
NRBC # BLD: 0 /100 WBCS — SIGNIFICANT CHANGE UP (ref 0–0)
PLATELET # BLD AUTO: 275 K/UL — SIGNIFICANT CHANGE UP (ref 130–400)
PLATELET # BLD AUTO: 278 K/UL — SIGNIFICANT CHANGE UP (ref 130–400)
PLATELET # BLD AUTO: 285 K/UL — SIGNIFICANT CHANGE UP (ref 130–400)
PMV BLD: 11.6 FL — HIGH (ref 7.4–10.4)
PMV BLD: 11.7 FL — HIGH (ref 7.4–10.4)
PMV BLD: 11.8 FL — HIGH (ref 7.4–10.4)
POTASSIUM SERPL-MCNC: 4.7 MMOL/L — SIGNIFICANT CHANGE UP (ref 3.5–5)
POTASSIUM SERPL-SCNC: 4.7 MMOL/L — SIGNIFICANT CHANGE UP (ref 3.5–5)
PROTHROM AB SERPL-ACNC: 10.7 SEC — SIGNIFICANT CHANGE UP (ref 9.95–12.87)
RBC # BLD: 4.09 M/UL — LOW (ref 4.2–5.4)
RBC # BLD: 4.09 M/UL — LOW (ref 4.2–5.4)
RBC # BLD: 4.26 M/UL — SIGNIFICANT CHANGE UP (ref 4.2–5.4)
RBC # BLD: 4.44 M/UL — SIGNIFICANT CHANGE UP (ref 4.2–5.4)
RBC # FLD: 12.7 % — SIGNIFICANT CHANGE UP (ref 11.5–14.5)
RBC # FLD: 12.8 % — SIGNIFICANT CHANGE UP (ref 11.5–14.5)
RBC # FLD: 12.8 % — SIGNIFICANT CHANGE UP (ref 11.5–14.5)
RETICS #: 125.2 K/UL — HIGH (ref 25–125)
RETICS/RBC NFR: 3.1 % — HIGH (ref 0.5–1.5)
SODIUM SERPL-SCNC: 133 MMOL/L — LOW (ref 135–146)
TSH SERPL-MCNC: 2.72 UIU/ML — SIGNIFICANT CHANGE UP (ref 0.27–4.2)
WBC # BLD: 6.75 K/UL — SIGNIFICANT CHANGE UP (ref 4.8–10.8)
WBC # BLD: 7.75 K/UL — SIGNIFICANT CHANGE UP (ref 4.8–10.8)
WBC # BLD: 8.3 K/UL — SIGNIFICANT CHANGE UP (ref 4.8–10.8)
WBC # FLD AUTO: 6.75 K/UL — SIGNIFICANT CHANGE UP (ref 4.8–10.8)
WBC # FLD AUTO: 7.75 K/UL — SIGNIFICANT CHANGE UP (ref 4.8–10.8)
WBC # FLD AUTO: 8.3 K/UL — SIGNIFICANT CHANGE UP (ref 4.8–10.8)

## 2023-07-18 PROCEDURE — 99233 SBSQ HOSP IP/OBS HIGH 50: CPT

## 2023-07-18 RX ADMIN — SENNA PLUS 2 TABLET(S): 8.6 TABLET ORAL at 21:25

## 2023-07-18 RX ADMIN — ENOXAPARIN SODIUM 90 MILLIGRAM(S): 100 INJECTION SUBCUTANEOUS at 05:46

## 2023-07-18 RX ADMIN — ENOXAPARIN SODIUM 90 MILLIGRAM(S): 100 INJECTION SUBCUTANEOUS at 17:10

## 2023-07-18 RX ADMIN — POLYETHYLENE GLYCOL 3350 17 GRAM(S): 17 POWDER, FOR SOLUTION ORAL at 17:11

## 2023-07-18 RX ADMIN — MORPHINE SULFATE 2 MILLIGRAM(S): 50 CAPSULE, EXTENDED RELEASE ORAL at 07:00

## 2023-07-18 RX ADMIN — MORPHINE SULFATE 2 MILLIGRAM(S): 50 CAPSULE, EXTENDED RELEASE ORAL at 06:20

## 2023-07-18 RX ADMIN — MORPHINE SULFATE 2 MILLIGRAM(S): 50 CAPSULE, EXTENDED RELEASE ORAL at 00:48

## 2023-07-18 RX ADMIN — MORPHINE SULFATE 2 MILLIGRAM(S): 50 CAPSULE, EXTENDED RELEASE ORAL at 00:18

## 2023-07-18 NOTE — PROGRESS NOTE ADULT - ASSESSMENT
Patient is a 32F PMHx h/o provoked LLE DVT 2/2 compression of left Iliac vein by uterine fibroids in 2021 (s/p stenting and previously on eliquis), h/o infertility and uterine fibroids presents for abdominal pain and shortness of breath following egg retrieval procedure on 7/11/23. Found to have multiple PEs. Admitted to medicine for PE and hyponatremia management.    #Acute b/l PEs  #h/o Provoked LLE DVT 2/2 compression of left iliac vein by uterine fibroid (2021)  - previously on Eliquis no longer taking  - s/p thrombectomy of LLE and s/p stenting by vascular at [facility]  - 7/15 - CTA of Chest showed b/l distal main, segmental and sub-segmental PEs  - PEs possibly 2/2 IVF/Egg retrieval  - started on Lovenox AC  - monitor respiratory status  - TTE 7/16:  1. Left ventricular ejection fraction, by visual estimation, is 50 to 55%. 2. Low-normal global left ventricular systolic function. 3. Moderate-to-severe tricuspid regurgitation. 4. Estimated pulmonary artery systolicpressure is 42.4 mmHg assuming a right atrial pressure of 3 mmHg, which is consistent with mild pulmonary hypertension.  - Keep patient on Lovenox 1 mg/kg  - can follow up with hematology outpatient  - 7/18 GYN consult: recommend correction of any electrolyte abnormalities, recommend PO hydration with electrolytes for tx OHSS, recommend monitor urine output, pain management PRN, avoid NSAIDs if concerned for kidney function, thrombophilia workup ordered   - factor 5 leiden, antithrombin III, protein C, protein S, anticardiolipin Ab, beta 2 glycoprotein 1 Ab ordered, awaiting results  - 7/18 - Hgb 14.6 --> 11.4, ordered repeat CBC and Type + Screen for 11:00      #Moderate Hyponatremia  - Na 127 on admission --> 7/17 130 (serum) --> 7/18 133  - serum osm 7/16 273, Urine Na 20 (7/17), urine osm 7/17 404  - 7/18- OBGYN team does not want fluid restriction. Hyponatremia mostly likely due to third spacing. Patient should be drinking fluids, monitor strict I's and O's. If urine output < 30 cc's/hour, restart IV fluids with NS    #Right Pleural Effusion  - CTA of Chest showed right sided pleural effusion  - satting 97% on RA, not in respiratory distress    #Severe Ovarian Hyperstimulation Syndrome  #Small to Moderate Volume Abd/Pelvic Ascites  #Infertility s/p egg retrieval (7/23)  #Uterine Fibroids  - TVUS showed: Markedly enlarged bilateral ovaries. Vascular flow demonstrated to the ovaries. Small to moderate free pelvic fluid. Uterine fibroids.  - tenderness to palpation in b/l lower abd  - c/w Zofran for nausea  - c/w Morphine 2mg IV q6hrs PRN for Pain  - holding home Oxycodone 5mg q6hrs PRN  - OBGYN following    #Constipation  - c/w senna and miralax    #DVT ppx: Lovenox AC  #GI ppx: none  #Diet: Regular  #Activity: AAT  #Code Status: Full Code  #Dispo: from home, acute

## 2023-07-18 NOTE — PROGRESS NOTE ADULT - ASSESSMENT
A/P: 31 yo, HD#3 with severe OHSS s/p egg retrieval on 7/11, with bilateral PEs, currently clinically and hemodynamically stable    -recommend correction of any electrolyte abnormalities  -recommend PO hydration with electrolytes for tx OHSS  -recommend monitor urine output  -pain management PRN, avoid NSAIDs if concerned for kidney function  -thrombophilia workup ordered  -TSH elevated  -GYN to follow, please contact us with any questions [phone]    Dr. Falcon and Dr. Angela to be made aware   A/P: 31 yo, HD#3 with severe OHSS s/p egg retrieval on 7/11, with bilateral PEs, currently clinically and hemodynamically stable, improving    -avoid fluid restriction  -strict I/Os  -keep urine output >30cc/hr, if less, give IVF with NS until adequate  -PO hydration with electrolytes, Gatorade, broth  -pain management PRN, avoid NSAIDs if concerned for kidney function  -f/u thrombophilia workup ordered  -GYN to follow, please contact us with any questions [phone]    Dr. Ezekiel desai and Dr. Angela at bedside   A/P: 31 yo, HD#3 with severe OHSS s/p egg retrieval on 7/11, with bilateral PEs, currently clinically and hemodynamically stable, clinically improving    #PE  -continue therapeutic anticoagulation  -f/u thrombophilia workup    #OHSS  -avoid fluid restriction  -strict I/Os  -keep urine output >30cc/hr, if less, give IVF with NS until adequate  -PO hydration with electrolytes, Gatorade, broth  -pain management PRN, avoid NSAIDs if concerned for kidney function  -GYN to follow, please contact us with any questions [phone]    Dr. Ezekiel desai and Dr. nAgela at bedside

## 2023-07-18 NOTE — PROGRESS NOTE ADULT - ASSESSMENT
HPI:  32F PMHx h/o provoked LLE DVT 2/2 compression of left Iliac vein by uterine fibroids in 2021 (s/p stenting and previously on eliquis), h/o infertility and uterine fibroids presents for abdominal pain and shortness of breath following egg retrieval procedure on 7/11/23. Following procedure pt noted to have abdominal pain which slowly increased. She presented to [facility] office and underwent TV paracentesis with drainage of fluid. Per PMD, US at that time noted 10cm ovaries with pelvic ascites 2/2 to Ovarian Hyperstimulation Syndrome (OHSS). Patient advised about expected symptoms of abdominal pain and SOB, encouraged to hydrate and drink electrolytes. Pt reports today her pain has increased, with additional localization to LLQ and right side. Also reports SOB, worse with ambulation. Reports mild nausea and constipation. Denies headaches, CP, fever, chills, vomiting, urinary symptoms, vaginal bleeding or discharge.    #Dyspnea secondary to acute pulmonary embolism   lovenox 1mg /kg q12h   hypercoagulable workup      #Hyponatremia secondary to ovarian hypostimulation syndrome   strict input and output   maintain 30 cc/hr - if less -> start normal saline   sodium level is improving     #Insomnia   on melatonin     #Anemia no indication for transfusion     #Serum pregnancy positive - gyn follow up     PROGRESS NOTE HANDOFF    Pending: monitor sodium level , monitor urine output     Family discussion: patient verbalized understanding and agreeable to plan of care     Disposition: Home

## 2023-07-19 ENCOUNTER — TRANSCRIPTION ENCOUNTER (OUTPATIENT)
Age: 33
End: 2023-07-19

## 2023-07-19 VITALS
HEART RATE: 104 BPM | TEMPERATURE: 98 F | RESPIRATION RATE: 18 BRPM | SYSTOLIC BLOOD PRESSURE: 105 MMHG | DIASTOLIC BLOOD PRESSURE: 57 MMHG

## 2023-07-19 LAB
ALBUMIN SERPL ELPH-MCNC: 3.5 G/DL — SIGNIFICANT CHANGE UP (ref 3.5–5.2)
ALP SERPL-CCNC: 60 U/L — SIGNIFICANT CHANGE UP (ref 30–115)
ALT FLD-CCNC: 30 U/L — SIGNIFICANT CHANGE UP (ref 0–41)
ANION GAP SERPL CALC-SCNC: 9 MMOL/L — SIGNIFICANT CHANGE UP (ref 7–14)
AST SERPL-CCNC: 44 U/L — HIGH (ref 0–41)
BASOPHILS # BLD AUTO: 0.04 K/UL — SIGNIFICANT CHANGE UP (ref 0–0.2)
BASOPHILS NFR BLD AUTO: 0.6 % — SIGNIFICANT CHANGE UP (ref 0–1)
BILIRUB SERPL-MCNC: 0.5 MG/DL — SIGNIFICANT CHANGE UP (ref 0.2–1.2)
BUN SERPL-MCNC: 7 MG/DL — LOW (ref 10–20)
CALCIUM SERPL-MCNC: 8.3 MG/DL — LOW (ref 8.4–10.4)
CHLORIDE SERPL-SCNC: 103 MMOL/L — SIGNIFICANT CHANGE UP (ref 98–110)
CO2 SERPL-SCNC: 24 MMOL/L — SIGNIFICANT CHANGE UP (ref 17–32)
CREAT SERPL-MCNC: 0.6 MG/DL — LOW (ref 0.7–1.5)
EGFR: 121 ML/MIN/1.73M2 — SIGNIFICANT CHANGE UP
EOSINOPHIL # BLD AUTO: 0.09 K/UL — SIGNIFICANT CHANGE UP (ref 0–0.7)
EOSINOPHIL NFR BLD AUTO: 1.4 % — SIGNIFICANT CHANGE UP (ref 0–8)
GLUCOSE SERPL-MCNC: 71 MG/DL — SIGNIFICANT CHANGE UP (ref 70–99)
HCT VFR BLD CALC: 31.4 % — LOW (ref 37–47)
HGB BLD-MCNC: 10.4 G/DL — LOW (ref 12–16)
IMM GRANULOCYTES NFR BLD AUTO: 0.5 % — HIGH (ref 0.1–0.3)
LYMPHOCYTES # BLD AUTO: 1.89 K/UL — SIGNIFICANT CHANGE UP (ref 1.2–3.4)
LYMPHOCYTES # BLD AUTO: 28.4 % — SIGNIFICANT CHANGE UP (ref 20.5–51.1)
MAGNESIUM SERPL-MCNC: 2.4 MG/DL — SIGNIFICANT CHANGE UP (ref 1.8–2.4)
MCHC RBC-ENTMCNC: 27.2 PG — SIGNIFICANT CHANGE UP (ref 27–31)
MCHC RBC-ENTMCNC: 33.1 G/DL — SIGNIFICANT CHANGE UP (ref 32–37)
MCV RBC AUTO: 82.2 FL — SIGNIFICANT CHANGE UP (ref 81–99)
MONOCYTES # BLD AUTO: 0.6 K/UL — SIGNIFICANT CHANGE UP (ref 0.1–0.6)
MONOCYTES NFR BLD AUTO: 9 % — SIGNIFICANT CHANGE UP (ref 1.7–9.3)
NEUTROPHILS # BLD AUTO: 4.01 K/UL — SIGNIFICANT CHANGE UP (ref 1.4–6.5)
NEUTROPHILS NFR BLD AUTO: 60.1 % — SIGNIFICANT CHANGE UP (ref 42.2–75.2)
NRBC # BLD: 0 /100 WBCS — SIGNIFICANT CHANGE UP (ref 0–0)
PLATELET # BLD AUTO: 271 K/UL — SIGNIFICANT CHANGE UP (ref 130–400)
PMV BLD: 11.6 FL — HIGH (ref 7.4–10.4)
POTASSIUM SERPL-MCNC: 4.7 MMOL/L — SIGNIFICANT CHANGE UP (ref 3.5–5)
POTASSIUM SERPL-SCNC: 4.7 MMOL/L — SIGNIFICANT CHANGE UP (ref 3.5–5)
PROT SERPL-MCNC: 5.6 G/DL — LOW (ref 6–8)
RBC # BLD: 3.82 M/UL — LOW (ref 4.2–5.4)
RBC # FLD: 13 % — SIGNIFICANT CHANGE UP (ref 11.5–14.5)
SODIUM SERPL-SCNC: 136 MMOL/L — SIGNIFICANT CHANGE UP (ref 135–146)
WBC # BLD: 6.66 K/UL — SIGNIFICANT CHANGE UP (ref 4.8–10.8)
WBC # FLD AUTO: 6.66 K/UL — SIGNIFICANT CHANGE UP (ref 4.8–10.8)

## 2023-07-19 PROCEDURE — 99239 HOSP IP/OBS DSCHRG MGMT >30: CPT

## 2023-07-19 RX ADMIN — POLYETHYLENE GLYCOL 3350 17 GRAM(S): 17 POWDER, FOR SOLUTION ORAL at 05:37

## 2023-07-19 RX ADMIN — ENOXAPARIN SODIUM 90 MILLIGRAM(S): 100 INJECTION SUBCUTANEOUS at 05:37

## 2023-07-19 NOTE — DISCHARGE NOTE PROVIDER - HOSPITAL COURSE
Patient is a 32F PMHx h/o provoked LLE DVT 2/2 compression of left Iliac vein by uterine fibroids in 2021 (s/p stenting and previously on eliquis), h/o infertility and uterine fibroids presents for abdominal pain and shortness of breath following egg retrieval procedure on 7/11/23. Found to have multiple PEs. Admitted to medicine for PE and hyponatremia management.    #Acute b/l PEs  #h/o Provoked LLE DVT 2/2 compression of left iliac vein by uterine fibroid (2021)  - previously on Eliquis no longer taking  - s/p thrombectomy of LLE and s/p stenting by vascular at [facility]  - 7/15 - CTA of Chest showed b/l distal main, segmental and sub-segmental PEs  - PEs possibly 2/2 IVF/Egg retrieval  - started on Lovenox AC  - TTE 7/16:  1. Left ventricular ejection fraction, by visual estimation, is 50 to 55%. 2. Low-normal global left ventricular systolic function. 3. Moderate-to-severe tricuspid regurgitation. 4. Estimated pulmonary artery systolicpressure is 42.4 mmHg assuming a right atrial pressure of 3 mmHg, which is consistent with mild pulmonary hypertension.  - can follow up with hematology outpatient  - 7/18 GYN consult: recommend correction of any electrolyte abnormalities, recommend PO hydration with electrolytes for tx OHSS, recommend monitor urine output, pain management PRN, avoid NSAIDs if concerned for kidney function, thrombophilia workup ordered   - factor 5 leiden, antithrombin III, protein C, protein S, anticardiolipin Ab, beta 2 glycoprotein 1 Ab ordered, follow up with the results as outpatient with heme   - 7/18 - Hgb 14.6 --> 11.4, ordered repeat CBC and Type + Screen for 11:00      #Moderate Hyponatremia  - Na 127 on admission --> 7/17 130 (serum) --> 7/18 133  - serum osm 7/16 273, Urine Na 20 (7/17), urine osm 7/17 404  - 7/18- OBGYN team does not want fluid restriction. Hyponatremia mostly likely due to third spacing. Patient should be drinking fluids, monitor strict I's and O's. If urine output < 30 cc's/hour, restart IV fluids with NS    #Right Pleural Effusion  - CTA of Chest showed right sided pleural effusion  - satting 97% on RA, not in respiratory distress    #Severe Ovarian Hyperstimulation Syndrome  #Small to Moderate Volume Abd/Pelvic Ascites  #Infertility s/p egg retrieval (7/23)  #Uterine Fibroids  - TVUS showed: Markedly enlarged bilateral ovaries. Vascular flow demonstrated to the ovaries. Small to moderate free pelvic fluid. Uterine fibroids.  - tenderness to palpation in b/l lower abd  - c/w Zofran for nausea  - c/w Morphine 2mg IV q6hrs PRN for Pain  - holding home Oxycodone 5mg q6hrs PRN  - OBGYN following    #Constipation  - c/w senna and miralax   Patient is a 32F PMHx h/o provoked LLE DVT 2/2 compression of left Iliac vein by uterine fibroids in 2021 (s/p stenting and previously on eliquis), h/o infertility and uterine fibroids presents for abdominal pain and shortness of breath following egg retrieval procedure on 7/11/23. Found to have multiple PEs. Admitted to medicine for PE and hyponatremia management.    #Acute b/l PEs  #h/o Provoked LLE DVT 2/2 compression of left iliac vein by uterine fibroid (2021)  - previously on Eliquis no longer taking  - s/p thrombectomy of LLE and s/p stenting by vascular at [facility]  - 7/15 - CTA of Chest showed b/l distal main, segmental and sub-segmental PEs  - PEs possibly 2/2 IVF/Egg retrieval  - started on Lovenox AC  - TTE 7/16:  1. Left ventricular ejection fraction, by visual estimation, is 50 to 55%. 2. Low-normal global left ventricular systolic function. 3. Moderate-to-severe tricuspid regurgitation. 4. Estimated pulmonary artery systolicpressure is 42.4 mmHg assuming a right atrial pressure of 3 mmHg, which is consistent with mild pulmonary hypertension.  - can follow up with hematology outpatient  - 7/18 GYN consult: recommend correction of any electrolyte abnormalities, recommend PO hydration with electrolytes for tx OHSS, recommend monitor urine output, pain management PRN, avoid NSAIDs if concerned for kidney function, thrombophilia workup ordered   - factor 5 leiden, antithrombin III, protein C, protein S, anticardiolipin Ab, beta 2 glycoprotein 1 Ab ordered, follow up with the results as outpatient with heme   - 7/18 - Hgb 14.6 --> 11.4 as per OBGYN, third spacing is improving     #Moderate Hyponatremia  - Na 127 on admission --> 7/17 130 (serum) --> 7/18 133  - serum osm 7/16 273, Urine Na 20 (7/17), urine osm 7/17 404  - 7/18- OBGYN team does not want fluid restriction. Hyponatremia mostly likely due to third spacing. Patient should be drinking fluids, monitor strict I's and O's. If urine output < 30 cc's/hour, restart IV fluids with NS    #Right Pleural Effusion  - CTA of Chest showed right sided pleural effusion  - satting 97% on RA, not in respiratory distress    #Severe Ovarian Hyperstimulation Syndrome  #Small to Moderate Volume Abd/Pelvic Ascites  #Infertility s/p egg retrieval (7/23)  #Uterine Fibroids  - TVUS showed: Markedly enlarged bilateral ovaries. Vascular flow demonstrated to the ovaries. Small to moderate free pelvic fluid. Uterine fibroids.  - tenderness to palpation in b/l lower abd  - c/w Zofran for nausea  - home Oxycodone 5mg q6hrs PRN  - OBGYN following    #Constipation  - c/w senna and miralax   Patient is a 32F PMHx h/o provoked LLE DVT 2/2 compression of left Iliac vein by uterine fibroids in 2021 (s/p stenting and previously on eliquis), h/o infertility and uterine fibroids presents for abdominal pain and shortness of breath following egg retrieval procedure on 7/11/23. Found to have multiple PEs. Admitted to medicine for PE and hyponatremia management.    # Acute bilateral pulmonary embolism  #h/o Provoked LLE DVT 2/2 compression of left iliac vein by uterine fibroid (2021)  - previously on Eliquis no longer taking  - s/p thrombectomy of LLE and s/p stenting by vascular at [facility]  - 7/15 - CTA of Chest showed b/l distal main, segmental and sub-segmental PEs  - PEs possibly 2/2 IVF/Egg retrieval  - started on Lovenox AC  - TTE 7/16:  1. Left ventricular ejection fraction, by visual estimation, is 50 to 55%. 2. Low-normal global left ventricular systolic function. 3. Moderate-to-severe tricuspid regurgitation. 4. Estimated pulmonary artery systolic pressure is 42.4 mmHg assuming a right atrial pressure of 3 mmHg, which is consistent with mild pulmonary hypertension.  -  out patient follow up with hematology   - 7/18 GYN consult: recommend correction of any electrolyte abnormalities, recommend PO hydration with electrolytes for tx OHSS, recommend monitor urine output, pain management PRN, avoid NSAIDs if concerned for kidney function, thrombophilia workup ordered : factor 5 leiden, antithrombin III, protein C, protein S, anticardiolipin Ab, beta 2 glycoprotein 1 Ab ordered, follow up with the results as outpatient with heme   - 7/18 - Hgb 14.6 --> 11.4 as per OBGYN, third spacing is improving   - GYN perspective, patient is cleared for discharge and can continue any further care related to her resolved OHSS outpatient.     #Hyponatremia- resolved  - Na 127 on admission   - serum osm 7/16 273, Urine Na 20 (7/17), urine osm 7/17 404  - 7/18- OBGYN team does not want fluid restriction. Hyponatremia mostly likely due to third spacing. Patient should be drinking fluids, monitor strict I's and O's. If urine output < 30 cc's/hour, restart IV fluids with NS    #Right Pleural Effusion  - CTA of Chest showed right sided pleural effusion  - satting 97% on RA, not in respiratory distress    #Severe Ovarian Hyperstimulation Syndrome  #Small to Moderate Volume Abd/Pelvic Ascites  #Infertility s/p egg retrieval (7/23)  #Uterine Fibroids  - TVUS showed: Markedly enlarged bilateral ovaries. Vascular flow demonstrated to the ovaries. Small to moderate free pelvic fluid. Uterine fibroids.  - tenderness to palpation in b/l lower abd  - home Oxycodone 5mg q6hrs PRN  - outpt F/u with obgyn    #Constipation- resolved  - c/w senna and miralax

## 2023-07-19 NOTE — DISCHARGE NOTE PROVIDER - CARE PROVIDER_API CALL
Rafael Benavides)  Hematology; Medical Oncology  [address] [address]  Phone: [phone]  Fax: [phone]  Follow Up Time: 1 week    Junie Rosario  Internal Medicine  [address] [place] [address]  Phone: [phone]  Fax: [phone]  Follow Up Time: 1 week

## 2023-07-19 NOTE — DISCHARGE NOTE PROVIDER - CARE PROVIDERS DIRECT ADDRESSES
[email or web address]

## 2023-07-19 NOTE — DISCHARGE NOTE PROVIDER - PROVIDER TOKENS
PROVIDER:[TOKEN:[[id number]],FOLLOWUP:[1 week]],PROVIDER:[TOKEN:[[id number]],FOLLOWUP:[1 week]]

## 2023-07-19 NOTE — DISCHARGE NOTE PROVIDER - ATTENDING DISCHARGE PHYSICAL EXAMINATION:
T(C): 36.9 (07-19-23 @ 07:49), Max: 36.9 (07-19-23 @ 07:49)  HR: 91 (07-19-23 @ 07:49) (91 - 99)  BP: 115/64 (07-19-23 @ 07:49) (99/58 - 115/64)  RR: 18 (07-19-23 @ 07:49) (18 - 18)  SpO2: 97% (07-19-23 @ 07:49) (97% - 97%)  O/E: awake, alert  HEENT: atraumatic, EOMI  Chest: clear  CVS: S1S2+, no murmur  P/A: soft, BS+  Ext: no edema feet  CNS: awake, alert  all system reviewed positive findings as above

## 2023-07-19 NOTE — PROGRESS NOTE ADULT - SUBJECTIVE AND OBJECTIVE BOX
INTERVAL HPI/OVERNIGHT EVENTS:    32F PMHx h/o provoked LLE DVT 2/2 compression of left Iliac vein by uterine fibroids in 2021 (s/p stenting and previously on eliquis), h/o infertility and uterine fibroids presents for abdominal pain and shortness of breath following egg retrieval procedure on 7/11/23. Found to have multiple PEs. Admitted to medicine for PE and hyponatremia management.  sob better    REVIEW OF SYSTEMS:  CONSTITUTIONAL: No fever, weight loss, or fatigue  EYES: No eye pain, visual disturbances, or discharge  ENMT:  No difficulty hearing, tinnitus, vertigo; No sinus or throat pain  NECK: No pain or stiffness  BREASTS: No pain, masses, or nipple discharge  RESPIRATORY: No cough, wheezing, chills or hemoptysis; No shortness of breath  CARDIOVASCULAR: No chest pain, palpitations, dizziness, or leg swelling  GASTROINTESTINAL: No abdominal or epigastric pain. No nausea, vomiting, or hematemesis; No diarrhea or constipation. No melena or hematochezia.  GENITOURINARY: No dysuria, frequency, hematuria, or incontinence  NEUROLOGICAL: No headaches, memory loss, loss of strength, numbness, or tremors  SKIN: No itching, burning, rashes, or lesions   LYMPH NODES: No enlarged glands  ENDOCRINE: No heat or cold intolerance; No hair loss  MUSCULOSKELETAL: No joint pain or swelling; No muscle, back, or extremity pain  PSYCHIATRIC: No depression, anxiety, mood swings, or difficulty sleeping  HEME/LYMPH: No easy bruising, or bleeding gums  ALLERY AND IMMUNOLOGIC: No hives or eczema    VITALS:  T(F): 97, Max: 98.1 (07-17-23 @ 00:16)  HR: 100  BP: 124/75  RR: 20  SpO2: 96%    PHYSICAL EXAM:  GENERAL: NAD,   HEAD:  Atraumatic, Normocephalic  EYES: EOMI, PERRLA, conjunctiva and sclera clear  ENMT: No tonsillar erythema, exudates, or enlargement; Moist mucous membranes, Good dentition, No lesions  NECK: Supple, No JVD, Normal thyroid  NERVOUS SYSTEM:  Alert & Oriented X3, Good concentration; Motor Strength 5/5 B/L upper and lower extremities; DTRs 2+ intact and symmetric  CHEST/LUNG: Clear to percussion bilaterally; No rales, rhonchi, wheezing, or rubs  HEART: Regular rate and rhythm; No murmurs, rubs, or gallops  ABDOMEN: Soft, Nontender, Nondistended; Bowel sounds present  EXTREMITIES:  2+ Peripheral Pulses, No clubbing, cyanosis, or edema  LYMPH: No lymphadenopathy noted  SKIN: No rashes or lesions      LABS:  Urinalysis Basic - ( 17 Jul 2023 07:37 )    Color: x / Appearance: x / SG: x / pH: x  Gluc: 87 mg/dL / Ketone: x  / Bili: x / Urobili: x   Blood: x / Protein: x / Nitrite: x   Leuk Esterase: x / RBC: x / WBC x   Sq Epi: x / Non Sq Epi: x / Bacteria: x      07-17    130<L>  |  99  |  14  ----------------------------<  87  5.1<H>   |  19  |  0.9    Ca    8.7      17 Jul 2023 07:37  Phos  3.3     07-15  Mg     2.5     07-17    TPro  5.9<L>  /  Alb  3.6  /  TBili  0.5  /  DBili  x   /  AST  26  /  ALT  13  /  AlkPhos  53  07-17                          14.6   10.29 )-----------( 283      ( 17 Jul 2023 07:37 )             44.2           RADIOLOGY & ADDITIONAL TESTS:    Imaging Personally Reviewed:  [ ] YES  [ ] NO    MEDICATIONS:     MEDICATIONS  (STANDING):  enoxaparin Injectable 90 milliGRAM(s) SubCutaneous every 12 hours  polyethylene glycol 3350 17 Gram(s) Oral two times a day  senna 2 Tablet(s) Oral at bedtime    MEDICATIONS  (PRN):  acetaminophen     Tablet .. 650 milliGRAM(s) Oral every 6 hours PRN Temp greater or equal to 38C (100.4F), Mild Pain (1 - 3)  aluminum hydroxide/magnesium hydroxide/simethicone Suspension 30 milliLiter(s) Oral every 4 hours PRN Dyspepsia  melatonin 3 milliGRAM(s) Oral at bedtime PRN Insomnia  morphine  - Injectable 2 milliGRAM(s) IV Push every 6 hours PRN Severe Pain (7 - 10)  ondansetron Injectable 4 milliGRAM(s) IV Push every 8 hours PRN Nausea and/or Vomiting      
CEASAR CHAVEZWANYANWU 32y Female  MRN#: [id number]     Hospital Day: 3d    Pt is currently admitted with the primary diagnosis of  Other pulmonary embolism without acute cor pulmonale        SUBJECTIVE     Overnight events  None    Subjective complaints  Pt was evaluated this am. Patient denied any active complaints.                                            ----------------------------------------------------------  OBJECTIVE  PAST MEDICAL & SURGICAL HISTORY  Fibroids    S/P dilation and curettage    H/O myomectomy                                              -----------------------------------------------------------  ALLERGIES:  No Known Allergies                                            ------------------------------------------------------------    HOME MEDICATIONS  Home Medications:  prenatal vitamin: 1 tab(s) orally once a day (14 Apr 2021 07:10)                           MEDICATIONS:  STANDING MEDICATIONS  enoxaparin Injectable 90 milliGRAM(s) SubCutaneous every 12 hours  polyethylene glycol 3350 17 Gram(s) Oral two times a day  senna 2 Tablet(s) Oral at bedtime    PRN MEDICATIONS  acetaminophen     Tablet .. 650 milliGRAM(s) Oral every 6 hours PRN  aluminum hydroxide/magnesium hydroxide/simethicone Suspension 30 milliLiter(s) Oral every 4 hours PRN  melatonin 3 milliGRAM(s) Oral at bedtime PRN  morphine  - Injectable 2 milliGRAM(s) IV Push every 6 hours PRN  ondansetron Injectable 4 milliGRAM(s) IV Push every 8 hours PRN                                            ------------------------------------------------------------  VITAL SIGNS: Last 24 Hours  T(C): 36.5 (18 Jul 2023 07:08), Max: 36.5 (18 Jul 2023 07:08)  T(F): 97.7 (18 Jul 2023 07:08), Max: 97.7 (18 Jul 2023 07:08)  HR: 86 (18 Jul 2023 07:08) (86 - 101)  BP: 110/58 (18 Jul 2023 07:08) (103/56 - 124/75)  BP(mean): --  RR: 18 (18 Jul 2023 07:08) (18 - 20)  SpO2: 100% (18 Jul 2023 07:08) (96% - 100%)                                             --------------------------------------------------------------  LABS:                        11.4   7.75  )-----------( 275      ( 18 Jul 2023 07:23 )             34.4     07-18    133<L>  |  101  |  11  ----------------------------<  80  4.7   |  24  |  0.7    Ca    8.3<L>      18 Jul 2023 07:23  Mg     2.5     07-17    TPro  5.9<L>  /  Alb  3.6  /  TBili  0.5  /  DBili  x   /  AST  26  /  ALT  13  /  AlkPhos  53  07-17      Urinalysis Basic - ( 18 Jul 2023 07:23 )    Color: x / Appearance: x / SG: x / pH: x  Gluc: 80 mg/dL / Ketone: x  / Bili: x / Urobili: x   Blood: x / Protein: x / Nitrite: x   Leuk Esterase: x / RBC: x / WBC x   Sq Epi: x / Non Sq Epi: x / Bacteria: x                                                            -------------------------------------------------------------  RADIOLOGY:                                            --------------------------------------------------------------    PHYSICAL EXAM:  GENERAL: NAD, lying in bed comfortably  HEAD:  Atraumatic, Normocephalic  EYES: EOMI, conjunctiva and sclera clear  ENT: Moist mucous membranes  NECK: Supple, No JVD  CHEST/LUNG: Clear to auscultation bilaterally; No rales, rhonchi, wheezing, or rubs. Unlabored respirations  HEART: regular rate and rhythm; No murmurs, rubs, or gallops  ABDOMEN: Bowel sounds present; Soft, Nontender, Nondistended.    EXTREMITIES: Warm. No clubbing, cyanosis, or edema  NERVOUS SYSTEM:  Alert & Oriented X3. No focal deficits   SKIN: No rashes or lesions                                           --------------------------------------------------------------                
CEASAR CHAVEZWANYANWU 32y Female  MRN#: [id number]     Hospital Day: 4d    Pt is currently admitted with the primary diagnosis of  Other pulmonary embolism without acute cor pulmonale        SUBJECTIVE     Overnight events  None    Subjective complaints  Pt was evaluated this am. Patient denied any active complaints and per patient his symptoms are improving                                            ----------------------------------------------------------  OBJECTIVE  PAST MEDICAL & SURGICAL HISTORY  Fibroids    S/P dilation and curettage    H/O myomectomy                                              -----------------------------------------------------------  ALLERGIES:  No Known Allergies                                            ------------------------------------------------------------    HOME MEDICATIONS  Home Medications:  prenatal vitamin: 1 tab(s) orally once a day (14 Apr 2021 07:10)                           MEDICATIONS:  STANDING MEDICATIONS  enoxaparin Injectable 90 milliGRAM(s) SubCutaneous every 12 hours  polyethylene glycol 3350 17 Gram(s) Oral two times a day  senna 2 Tablet(s) Oral at bedtime    PRN MEDICATIONS  acetaminophen     Tablet .. 650 milliGRAM(s) Oral every 6 hours PRN  aluminum hydroxide/magnesium hydroxide/simethicone Suspension 30 milliLiter(s) Oral every 4 hours PRN  melatonin 3 milliGRAM(s) Oral at bedtime PRN  morphine  - Injectable 2 milliGRAM(s) IV Push every 6 hours PRN  ondansetron Injectable 4 milliGRAM(s) IV Push every 8 hours PRN                                            ------------------------------------------------------------  VITAL SIGNS: Last 24 Hours  T(C): 36.9 (19 Jul 2023 07:49), Max: 36.9 (19 Jul 2023 07:49)  T(F): 98.4 (19 Jul 2023 07:49), Max: 98.4 (19 Jul 2023 07:49)  HR: 91 (19 Jul 2023 07:49) (91 - 99)  BP: 115/64 (19 Jul 2023 07:49) (99/58 - 115/64)  BP(mean): --  RR: 18 (19 Jul 2023 07:49) (18 - 18)  SpO2: 97% (19 Jul 2023 07:49) (97% - 97%)      07-18-23 @ 07:01  -  07-19-23 @ 07:00  --------------------------------------------------------  IN: 200 mL / OUT: 2180 mL / NET: -1980 mL                                             --------------------------------------------------------------  LABS:                        10.4   6.66  )-----------( 271      ( 19 Jul 2023 06:23 )             31.4     07-19    136  |  103  |  7<L>  ----------------------------<  71  4.7   |  24  |  0.6<L>    Ca    8.3<L>      19 Jul 2023 06:23  Mg     2.4     07-19    TPro  5.6<L>  /  Alb  3.5  /  TBili  0.5  /  DBili  x   /  AST  44<H>  /  ALT  30  /  AlkPhos  60  07-19    PT/INR - ( 18 Jul 2023 17:57 )   PT: 10.70 sec;   INR: 0.94 ratio         PTT - ( 18 Jul 2023 17:57 )  PTT:35.1 sec  Urinalysis Basic - ( 19 Jul 2023 06:23 )    Color: x / Appearance: x / SG: x / pH: x  Gluc: 71 mg/dL / Ketone: x  / Bili: x / Urobili: x   Blood: x / Protein: x / Nitrite: x   Leuk Esterase: x / RBC: x / WBC x   Sq Epi: x / Non Sq Epi: x / Bacteria: x                                                            -------------------------------------------------------------  RADIOLOGY:                                            --------------------------------------------------------------    PHYSICAL EXAM:  GENERAL: NAD, lying in bed comfortably  HEAD:  Atraumatic, Normocephalic  EYES: EOMI, conjunctiva and sclera clear  ENT: Moist mucous membranes  NECK: Supple, No JVD  CHEST/LUNG: Clear to auscultation bilaterally; No rales, rhonchi, wheezing, or rubs. Unlabored respirations  HEART: regular rate and rhythm; No murmurs, rubs, or gallops  ABDOMEN: Bowel sounds present; Soft, nondistended. Minimal TTP LLQ.   EXTREMITIES: Warm. No clubbing, cyanosis, or edema  NERVOUS SYSTEM:  Alert & Oriented X3. No focal deficits   SKIN: No rashes or lesions                                           --------------------------------------------------------------                
PGY4 Note:    Patient seen and examined. Pain better controlled at this time. Reports that shortness of breath has improved. No acute complaints at this time. Denies fever, chills, nausea, vomiting, chest pain, worsening shortness of breath, severe abdominal pain, heavy vaginal bleeding. Is ambulating, tolerating PO.    Physical Exam:  Vital Signs:  T(C): 36.9 (07-16-23 @ 07:26), Max: 36.9 (07-16-23 @ 07:26)  HR: 100 (07-16-23 @ 07:26) (98 - 121)  BP: 113/77 (07-16-23 @ 07:26) (106/67 - 116/62)  RR: 18 (07-16-23 @ 07:26) (18 - 26)  SpO2: 100% (07-16-23 @ 07:26) (97% - 100%)    Gen: AOx3, NAD  Heart: RRR, S1S2, no m/r/g  Lungs: CTA b/l  Abd: soft, mild-moderately tender diffusely, worse in b/l lower quadrants, tympanic, no rebound, guarding, or rigidity  VE: Deferred, no active bleeding  Ext: SCDs, no edema or calf tenderness bilaterally    LABS:                        14.7   10.69 )-----------( 281      ( 15 Jul 2023 18:07 )             44.2       ABO RH Interpretation: A POS (07-15-23 @ 18:07)  Antibody Screen: NEG (07-15-23 @ 18:07)    07-15    127<L>  |  95<L>  |  8<L>  ----------------------------<  86  4.9   |  19  |  1.0    Ca    8.8      15 Jul 2023 18:07  Phos  3.3     07-15  Mg     2.3     07-15    TPro  5.9<L>  /  Alb  3.6  /  TBili  0.5  /  DBili  x   /  AST  20  /  ALT  11  /  AlkPhos  53  07-15    PT/INR - ( 15 Jul 2023 18:07 )   PT: 12.00 sec;   INR: 1.05 ratio         PTT - ( 15 Jul 2023 18:07 )  PTT:31.1 sec  Urinalysis Basic - ( 15 Jul 2023 18:07 )    Color: x / Appearance: x / SG: x / pH: x  Gluc: 86 mg/dL / Ketone: x  / Bili: x / Urobili: x   Blood: x / Protein: x / Nitrite: x   Leuk Esterase: x / RBC: x / WBC x   Sq Epi: x / Non Sq Epi: x / Bacteria: x    RADIOLOGY & ADDITIONAL STUDIES:  < from: CT Abdomen and Pelvis w/ IV Cont (07.15.23 @ 20:55) >    PROCEDURE DATE:  07/15/2023          INTERPRETATION:  CLINICAL HISTORY / REASON FOR EXAM:Chest and abdominal   pain    TECHNIQUE: Contiguous axial CT images were obtained from the thoracic   inlet to the lung bases 65 mL intravenous contrast using a CTA pulmonary   embolism protocol. The patient's abdomen from the diaphragm to the pubic   symphysis was then scanned using 30 mL Omnipaque 350 intravenous   contrast. Oral contrast was not administered. Coronal, sagittal and   3D/MIP reformatted images are also submitted.    COMPARISON CT: CT chest, abdomen and pelvis from September 18, 2021    OTHER STUDIES USED FOR CORRELATION: Ultrasound pelvis from July 15, 2023      FINDINGS:    CHEST:    PULMONARY EMBOLUS: Thrombus within the distal right main pulmonary   artery. Additional thrombi within the segmental and subsegmental branches  of the right upper and lower lobes. Thrombus within the left upper and   lower lobe segmental subsegmental branches. No CTA evidence of right   heart strain (LV / RV ratio > 1.    LUNGS, PLEURA, AND AIRWAYS: Right pleural effusion. No pneumothorax.  Central airways patent.    MEDIASTINUM/THORACIC NODES: No mediastinal or axillary lymphadenopathy.    HEART/GREAT VESSELS: No pericardial effusion. Heart size unremarkable. No   aneurysmal dilation of the thoracic aorta.      ABDOMEN/PELVIS:    HEPATOBILIARY: Unremarkable.    SPLEEN: Unremarkable.    PANCREAS: Unremarkable.    ADRENAL GLANDS: Unremarkable.    KIDNEYS: Symmetric enhancement bilaterally. No evidence of   hydronephrosis. Right upper pole calyceal diverticulum versus cyst.    ABDOMINOPELVIC NODES: Unremarkable.    PELVIC ORGANS: Markedly enlarged bilateral ovaries, better characterized   on recent pelvic ultrasound. Enlarged, fibroid uterus.    PERITONEUM/MESENTERY/BOWEL: Lower abdominal ascites. No obstruction or   intraperitoneal free air.    BONES/SOFT TISSUES: No acute osseous abnormality. Fat-containing   umbilical hernia.    VASCULAR: Left common iliac venous stent.      IMPRESSION:    CHEST:    Acute bilateral pulmonary emboli. Thrombus within the distal right main   pulmonary artery with additional smaller thrombi within the right upper   and lower lobe segmental and subsegmental branches. Left-sided thrombus   within the left upper and lower lobe segmental and subsegmental branches.   No CTA evidence of right heart strain.    Right pleural effusion.    ABDOMEN/PELVIS:    Markedly enlarged bilateral ovaries, which can be seen with ovarian   hyperstimulation syndrome.    Small moderate volume of intra-abdominal ascites within the lower pelvis.    Medications:  acetaminophen     Tablet .. 650 milliGRAM(s) Oral every 6 hours PRN  aluminum hydroxide/magnesium hydroxide/simethicone Suspension 30 milliLiter(s) Oral every 4 hours PRN  enoxaparin Injectable 90 milliGRAM(s) SubCutaneous every 12 hours  melatonin 3 milliGRAM(s) Oral at bedtime PRN  morphine  - Injectable 2 milliGRAM(s) IV Push every 6 hours PRN  ondansetron Injectable 4 milliGRAM(s) IV Push every 8 hours PRN  polyethylene glycol 3350 17 Gram(s) Oral two times a day  senna 2 Tablet(s) Oral at bedtime    
PGY2 Note:    Patient seen and examined. No acute event overnight. Reports improvement in SOB, now able to lie flat. Pain improved, though continued to endorse some abdominal pain. Reports less bloating and decreased abdominal size. Denies fever, chills, nausea, vomiting, chest pain, worsening shortness of breath, severe abdominal pain, heavy vaginal bleeding. Is ambulating, tolerating PO, +BM.    Physical Exam:  Vital Signs Last 24 Hrs  T(C): 36.7 (17 Jul 2023 00:16), Max: 36.9 (16 Jul 2023 07:26)  T(F): 98.1 (17 Jul 2023 00:16), Max: 98.4 (16 Jul 2023 07:26)  HR: 94 (17 Jul 2023 00:16) (94 - 102)  BP: 101/65 (17 Jul 2023 00:16) (96/61 - 113/77)  RR: 18 (17 Jul 2023 00:16) (18 - 18)  SpO2: 95% (17 Jul 2023 00:16) (95% - 100%)    Parameters below as of 17 Jul 2023 00:16  Patient On (Oxygen Delivery Method): room air    Gen: AOx3, NAD  Heart: RRR, S1S2, no m/r/g  Lungs: CTA b/l  Abd: soft, mild-moderately tender diffusely, worse in b/l lower quadrants, tympanic, no rebound, guarding, or rigidity  VE: Deferred, no active bleeding  Ext: SCDs, no edema or calf tenderness bilaterally    LABS:                                 14.3   11.73 )-----------( 258      ( 16 Jul 2023 12:28 )             43.4       07-16    126<L>  |  97<L>  |  13  ----------------------------<  86  5.2<H>   |  17  |  1.0    Ca    8.6      16 Jul 2023 12:28  Phos  3.3     07-15  Mg     2.4     07-16    TPro  5.3<L>  /  Alb  3.4<L>  /  TBili  0.6  /  DBili  x   /  AST  21  /  ALT  11  /  AlkPhos  48  07-16          Urinalysis Basic - ( 16 Jul 2023 12:28 )    Color: x / Appearance: x / SG: x / pH: x  Gluc: 86 mg/dL / Ketone: x  / Bili: x / Urobili: x   Blood: x / Protein: x / Nitrite: x   Leuk Esterase: x / RBC: x / WBC x   Sq Epi: x / Non Sq Epi: x / Bacteria: x        PT/INR - ( 15 Jul 2023 18:07 )   PT: 12.00 sec;   INR: 1.05 ratio         PTT - ( 15 Jul 2023 18:07 )  PTT:31.1 sec    CARDIAC MARKERS ( 15 Jul 2023 18:07 )  x     / <0.01 ng/mL / x     / x     / x            RADIOLOGY & ADDITIONAL STUDIES:  < from: CT Abdomen and Pelvis w/ IV Cont (07.15.23 @ 20:55) >    PROCEDURE DATE:  07/15/2023          INTERPRETATION:  CLINICAL HISTORY / REASON FOR EXAM:Chest and abdominal   pain    TECHNIQUE: Contiguous axial CT images were obtained from the thoracic   inlet to the lung bases 65 mL intravenous contrast using a CTA pulmonary   embolism protocol. The patient's abdomen from the diaphragm to the pubic   symphysis was then scanned using 30 mL Omnipaque 350 intravenous   contrast. Oral contrast was not administered. Coronal, sagittal and   3D/MIP reformatted images are also submitted.    COMPARISON CT: CT chest, abdomen and pelvis from September 18, 2021    OTHER STUDIES USED FOR CORRELATION: Ultrasound pelvis from July 15, 2023      FINDINGS:    CHEST:    PULMONARY EMBOLUS: Thrombus within the distal right main pulmonary   artery. Additional thrombi within the segmental and subsegmental branches  of the right upper and lower lobes. Thrombus within the left upper and   lower lobe segmental subsegmental branches. No CTA evidence of right   heart strain (LV / RV ratio > 1.    LUNGS, PLEURA, AND AIRWAYS: Right pleural effusion. No pneumothorax.  Central airways patent.    MEDIASTINUM/THORACIC NODES: No mediastinal or axillary lymphadenopathy.    HEART/GREAT VESSELS: No pericardial effusion. Heart size unremarkable. No   aneurysmal dilation of the thoracic aorta.      ABDOMEN/PELVIS:    HEPATOBILIARY: Unremarkable.    SPLEEN: Unremarkable.    PANCREAS: Unremarkable.    ADRENAL GLANDS: Unremarkable.    KIDNEYS: Symmetric enhancement bilaterally. No evidence of   hydronephrosis. Right upper pole calyceal diverticulum versus cyst.    ABDOMINOPELVIC NODES: Unremarkable.    PELVIC ORGANS: Markedly enlarged bilateral ovaries, better characterized   on recent pelvic ultrasound. Enlarged, fibroid uterus.    PERITONEUM/MESENTERY/BOWEL: Lower abdominal ascites. No obstruction or   intraperitoneal free air.    BONES/SOFT TISSUES: No acute osseous abnormality. Fat-containing   umbilical hernia.    VASCULAR: Left common iliac venous stent.      IMPRESSION:    CHEST:    Acute bilateral pulmonary emboli. Thrombus within the distal right main   pulmonary artery with additional smaller thrombi within the right upper   and lower lobe segmental and subsegmental branches. Left-sided thrombus   within the left upper and lower lobe segmental and subsegmental branches.   No CTA evidence of right heart strain.    Right pleural effusion.    ABDOMEN/PELVIS:    Markedly enlarged bilateral ovaries, which can be seen with ovarian   hyperstimulation syndrome.    Small moderate volume of intra-abdominal ascites within the lower pelvis.    Medications:  acetaminophen     Tablet .. 650 milliGRAM(s) Oral every 6 hours PRN  aluminum hydroxide/magnesium hydroxide/simethicone Suspension 30 milliLiter(s) Oral every 4 hours PRN  enoxaparin Injectable 90 milliGRAM(s) SubCutaneous every 12 hours  melatonin 3 milliGRAM(s) Oral at bedtime PRN  morphine  - Injectable 2 milliGRAM(s) IV Push every 6 hours PRN  ondansetron Injectable 4 milliGRAM(s) IV Push every 8 hours PRN  polyethylene glycol 3350 17 Gram(s) Oral two times a day  senna 2 Tablet(s) Oral at bedtime    
PGY2 Note:    Patient seen and examined. No acute event overnight. SOb improved, continues to report mild-moderate abdominal pain on the RUQ. Ambulating to toilet without issue. Denies fever, chills, nausea, vomiting, chest pain, worsening shortness of breath, severe abdominal pain, heavy vaginal bleeding. Is tolerating PO, +BM.    Physical Exam:  Vital Signs Last 24 Hrs  T(C): 35.4 (17 Jul 2023 23:47), Max: 36.2 (17 Jul 2023 16:29)  T(F): 95.7 (17 Jul 2023 23:47), Max: 97.1 (17 Jul 2023 16:29)  HR: 98 (17 Jul 2023 23:47) (80 - 101)  BP: 103/56 (17 Jul 2023 23:47) (103/56 - 140/76)  RR: 18 (17 Jul 2023 23:47) (18 - 20)  SpO2: 97% (17 Jul 2023 23:47) (95% - 99%)    Parameters below as of 17 Jul 2023 15:23  Patient On (Oxygen Delivery Method): room air        Gen: AOx3, NAD  Heart: RRR, S1S2, no m/r/g  Lungs: CTA b/l  Abd: soft, mild-moderately tender diffusely, no rebound, guarding, or rigidity  VE: Deferred, no active bleeding  Ext: SCDs, no edema or calf tenderness bilaterally    LABS:                        14.6   10.29 )-----------( 283      ( 17 Jul 2023 07:37 )             44.2       07-17    130<L>  |  99  |  14  ----------------------------<  87  5.1<H>   |  19  |  0.9    Ca    8.7      17 Jul 2023 07:37  Mg     2.5     07-17    TPro  5.9<L>  /  Alb  3.6  /  TBili  0.5  /  DBili  x   /  AST  26  /  ALT  13  /  AlkPhos  53  07-17          Urinalysis Basic - ( 17 Jul 2023 07:37 )    Color: x / Appearance: x / SG: x / pH: x  Gluc: 87 mg/dL / Ketone: x  / Bili: x / Urobili: x   Blood: x / Protein: x / Nitrite: x   Leuk Esterase: x / RBC: x / WBC x   Sq Epi: x / Non Sq Epi: x / Bacteria: x        INTERPRETATION:  CLINICAL HISTORY / REASON FOR EXAM:Chest and abdominal   pain    TECHNIQUE: Contiguous axial CT images were obtained from the thoracic   inlet to the lung bases 65 mL intravenous contrast using a CTA pulmonary   embolism protocol. The patient's abdomen from the diaphragm to the pubic   symphysis was then scanned using 30 mL Omnipaque 350 intravenous   contrast. Oral contrast was not administered. Coronal, sagittal and   3D/MIP reformatted images are also submitted.    COMPARISON CT: CT chest, abdomen and pelvis from September 18, 2021    OTHER STUDIES USED FOR CORRELATION: Ultrasound pelvis from July 15, 2023      FINDINGS:    CHEST:    PULMONARY EMBOLUS: Thrombus within the distal right main pulmonary   artery. Additional thrombi within the segmental and subsegmental branches  of the right upper and lower lobes. Thrombus within the left upper and   lower lobe segmental subsegmental branches. No CTA evidence of right   heart strain (LV / RV ratio > 1.    LUNGS, PLEURA, AND AIRWAYS: Right pleural effusion. No pneumothorax.  Central airways patent.    MEDIASTINUM/THORACIC NODES: No mediastinal or axillary lymphadenopathy.    HEART/GREAT VESSELS: No pericardial effusion. Heart size unremarkable. No   aneurysmal dilation of the thoracic aorta.      ABDOMEN/PELVIS:    HEPATOBILIARY: Unremarkable.    SPLEEN: Unremarkable.    PANCREAS: Unremarkable.    ADRENAL GLANDS: Unremarkable.    KIDNEYS: Symmetric enhancement bilaterally. No evidence of   hydronephrosis. Right upper pole calyceal diverticulum versus cyst.    ABDOMINOPELVIC NODES: Unremarkable.    PELVIC ORGANS: Markedly enlarged bilateral ovaries, better characterized   on recent pelvic ultrasound. Enlarged, fibroid uterus.    PERITONEUM/MESENTERY/BOWEL: Lower abdominal ascites. No obstruction or   intraperitoneal free air.    BONES/SOFT TISSUES: No acute osseous abnormality. Fat-containing   umbilical hernia.    VASCULAR: Left common iliac venous stent.      IMPRESSION:    CHEST:    Acute bilateral pulmonary emboli. Thrombus within the distal right main   pulmonary artery with additional smaller thrombi within the right upper   and lower lobe segmental and subsegmental branches. Left-sided thrombus   within the left upper and lower lobe segmental and subsegmental branches.   No CTA evidence of right heart strain.    Right pleural effusion.    ABDOMEN/PELVIS:    Markedly enlarged bilateral ovaries, which can be seen with ovarian   hyperstimulation syndrome.    Small moderate volume of intra-abdominal ascites within the lower pelvis.    Medications:  acetaminophen     Tablet .. 650 milliGRAM(s) Oral every 6 hours PRN  aluminum hydroxide/magnesium hydroxide/simethicone Suspension 30 milliLiter(s) Oral every 4 hours PRN  enoxaparin Injectable 90 milliGRAM(s) SubCutaneous every 12 hours  melatonin 3 milliGRAM(s) Oral at bedtime PRN  morphine  - Injectable 2 milliGRAM(s) IV Push every 6 hours PRN  ondansetron Injectable 4 milliGRAM(s) IV Push every 8 hours PRN  polyethylene glycol 3350 17 Gram(s) Oral two times a day  senna 2 Tablet(s) Oral at bedtime    
  SCOTTSHAQUILLECEASAR SHINE  32y  [facility]      Patient is a 32y old  Female who presents with a chief complaint of SOB and abdominal pain (18 Jul 2023 09:28)      My note supersedes the resident's note      INTERVAL HPI/OVERNIGHT EVENTS:        REVIEW OF SYSTEMS:  CONSTITUTIONAL: No fever, weight loss, or fatigue  EYES: No eye pain, visual disturbances, or discharge  ENMT:  No difficulty hearing, tinnitus, vertigo; No sinus or throat pain  NECK: No pain or stiffness  BREASTS: No pain, masses, or nipple discharge  RESPIRATORY: No cough, wheezing, chills or hemoptysis; No shortness of breath  CARDIOVASCULAR: No chest pain, palpitations, dizziness, or leg swelling  GASTROINTESTINAL: No abdominal or epigastric pain. No nausea, vomiting, or hematemesis; No diarrhea or constipation. No melena or hematochezia.  GENITOURINARY: No dysuria, frequency, hematuria, or incontinence  NEUROLOGICAL: No headaches, memory loss, loss of strength, numbness, or tremors  SKIN: No itching, burning, rashes, or lesions   LYMPH NODES: No enlarged glands  ENDOCRINE: No heat or cold intolerance; No hair loss  MUSCULOSKELETAL: No joint pain or swelling; No muscle, back, or extremity pain  PSYCHIATRIC: No depression, anxiety, mood swings, or difficulty sleeping  HEME/LYMPH: No easy bruising, or bleeding gums  ALLERY AND IMMUNOLOGIC: No hives or eczema  FAMILY HISTORY:  FH: hypertension      T(C): 36.5 (07-18-23 @ 07:08), Max: 36.5 (07-18-23 @ 07:08)  HR: 86 (07-18-23 @ 07:08) (86 - 101)  BP: 110/58 (07-18-23 @ 07:08) (103/56 - 124/75)  RR: 18 (07-18-23 @ 07:08) (18 - 20)  SpO2: 100% (07-18-23 @ 07:08) (96% - 100%)  Wt(kg): --Vital Signs Last 24 Hrs  T(C): 36.5 (18 Jul 2023 07:08), Max: 36.5 (18 Jul 2023 07:08)  T(F): 97.7 (18 Jul 2023 07:08), Max: 97.7 (18 Jul 2023 07:08)  HR: 86 (18 Jul 2023 07:08) (86 - 101)  BP: 110/58 (18 Jul 2023 07:08) (103/56 - 124/75)  BP(mean): --  RR: 18 (18 Jul 2023 07:08) (18 - 20)  SpO2: 100% (18 Jul 2023 07:08) (96% - 100%)    Parameters below as of 17 Jul 2023 15:23  Patient On (Oxygen Delivery Method): room air        PHYSICAL EXAM:  GENERAL: NAD,   HEAD:  Atraumatic, Normocephalic  EYES: EOMI, PERRLA, conjunctiva and sclera clear  ENMT: No tonsillar erythema, exudates, or enlargement; Moist mucous membranes, Good dentition, No lesions  NECK: Supple, No JVD, Normal thyroid  NERVOUS SYSTEM:  Alert & Oriented X3, Good concentration; Motor Strength 5/5 B/L upper and lower extremities; DTRs 2+ intact and symmetric  PULM: Clear to auscultation bilaterally  CARDIAC: Regular rate and rhythm; No murmurs, rubs, or gallops  GI: Soft, Nontender, Nondistended; Bowel sounds present  EXTREMITIES:  2+ Peripheral Pulses, No clubbing, cyanosis, or edema  LYMPH: No lymphadenopathy noted  SKIN: No rashes or lesions    Consultant(s) Notes Reviewed:  [x ] YES  [ ] NO  Care Discussed with Consultants/Other Providers [ x] YES  [ ] NO    LABS:                            11.4   7.75  )-----------( 275      ( 18 Jul 2023 07:23 )             34.4   07-18    133<L>  |  101  |  11  ----------------------------<  80  4.7   |  24  |  0.7    Ca    8.3<L>      18 Jul 2023 07:23  Mg     2.5     07-17    TPro  5.9<L>  /  Alb  3.6  /  TBili  0.5  /  DBili  x   /  AST  26  /  ALT  13  /  AlkPhos  53  07-17            acetaminophen     Tablet .. 650 milliGRAM(s) Oral every 6 hours PRN  aluminum hydroxide/magnesium hydroxide/simethicone Suspension 30 milliLiter(s) Oral every 4 hours PRN  enoxaparin Injectable 90 milliGRAM(s) SubCutaneous every 12 hours  melatonin 3 milliGRAM(s) Oral at bedtime PRN  morphine  - Injectable 2 milliGRAM(s) IV Push every 6 hours PRN  ondansetron Injectable 4 milliGRAM(s) IV Push every 8 hours PRN  polyethylene glycol 3350 17 Gram(s) Oral two times a day  senna 2 Tablet(s) Oral at bedtime      HEALTH ISSUES - PROBLEM Dx:  Pulmonary thromboembolism            Case Discussed with House Staff   45 minutes spent on total encounter; more than 50% of the visit was spent counseling and/or coordinating care by the attending physician.    Phoneplus [phone]  
PGY2 Note:    Patient seen and examined. No acute event overnight. SOB improved, none at rest. Reports mild SOB when ambulating around unit yesterday. Ambulating to toilet without difficulty. Abdominal pain also significantly improved, now only mild in BL lower quadrants.  Denies fever, chills, nausea, vomiting, chest pain, worsening shortness of breath, severe abdominal pain, heavy vaginal bleeding, LE swelling or pain. Tolerating PO, voiding, +BM.     Physical Exam:  Vital Signs Last 24 Hrs  T(C): 36.4 (19 Jul 2023 00:03), Max: 36.4 (19 Jul 2023 00:03)  T(F): 97.5 (19 Jul 2023 00:03), Max: 97.5 (19 Jul 2023 00:03)  HR: 92 (19 Jul 2023 00:03) (92 - 99)  BP: 99/58 (19 Jul 2023 00:03) (99/58 - 112/70)  RR: 18 (19 Jul 2023 00:03) (18 - 18)  SpO2: 97% (18 Jul 2023 15:02) (97% - 97%)    Parameters below as of 19 Jul 2023 00:03  Patient On (Oxygen Delivery Method): room air    Gen: AOx3, NAD  Heart: RRR, S1S2, no m/r/g  Lungs: CTA b/l  Abd: soft, mild tenderness in BL LQs, no rebound, guarding, or rigidity  VE: Deferred, no active bleeding  Ext: no edema or calf tenderness bilaterally    Overnight urine output ([id number]) 120cc/hr    MEDICATIONS  (STANDING):  enoxaparin Injectable 90 milliGRAM(s) SubCutaneous every 12 hours  polyethylene glycol 3350 17 Gram(s) Oral two times a day  senna 2 Tablet(s) Oral at bedtime    MEDICATIONS  (PRN):  acetaminophen     Tablet .. 650 milliGRAM(s) Oral every 6 hours PRN Temp greater or equal to 38C (100.4F), Mild Pain (1 - 3)  aluminum hydroxide/magnesium hydroxide/simethicone Suspension 30 milliLiter(s) Oral every 4 hours PRN Dyspepsia  melatonin 3 milliGRAM(s) Oral at bedtime PRN Insomnia  morphine  - Injectable 2 milliGRAM(s) IV Push every 6 hours PRN Severe Pain (7 - 10)  ondansetron Injectable 4 milliGRAM(s) IV Push every 8 hours PRN Nausea and/or Vomiting    LABS:                                 11.2   6.75  )-----------( 278      ( 18 Jul 2023 17:57 )             33.4       07-18    133<L>  |  101  |  11  ----------------------------<  80  4.7   |  24  |  0.7    Ca    8.3<L>      18 Jul 2023 07:23  Mg     2.5     07-17    TPro  5.9<L>  /  Alb  3.6  /  TBili  0.5  /  DBili  x   /  AST  26  /  ALT  13  /  AlkPhos  53  07-17        Urinalysis Basic - ( 18 Jul 2023 07:23 )    Color: x / Appearance: x / SG: x / pH: x  Gluc: 80 mg/dL / Ketone: x  / Bili: x / Urobili: x   Blood: x / Protein: x / Nitrite: x   Leuk Esterase: x / RBC: x / WBC x   Sq Epi: x / Non Sq Epi: x / Bacteria: x        PT/INR - ( 18 Jul 2023 17:57 )   PT: 10.70 sec;   INR: 0.94 ratio         PTT - ( 18 Jul 2023 17:57 )  PTT:35.1 sec        INTERPRETATION:  CLINICAL HISTORY / REASON FOR EXAM:Chest and abdominal   pain    TECHNIQUE: Contiguous axial CT images were obtained from the thoracic   inlet to the lung bases 65 mL intravenous contrast using a CTA pulmonary   embolism protocol. The patient's abdomen from the diaphragm to the pubic   symphysis was then scanned using 30 mL Omnipaque 350 intravenous   contrast. Oral contrast was not administered. Coronal, sagittal and   3D/MIP reformatted images are also submitted.    COMPARISON CT: CT chest, abdomen and pelvis from September 18, 2021    OTHER STUDIES USED FOR CORRELATION: Ultrasound pelvis from July 15, 2023      FINDINGS:    CHEST:    PULMONARY EMBOLUS: Thrombus within the distal right main pulmonary   artery. Additional thrombi within the segmental and subsegmental branches  of the right upper and lower lobes. Thrombus within the left upper and   lower lobe segmental subsegmental branches. No CTA evidence of right   heart strain (LV / RV ratio > 1.    LUNGS, PLEURA, AND AIRWAYS: Right pleural effusion. No pneumothorax.  Central airways patent.    MEDIASTINUM/THORACIC NODES: No mediastinal or axillary lymphadenopathy.    HEART/GREAT VESSELS: No pericardial effusion. Heart size unremarkable. No   aneurysmal dilation of the thoracic aorta.      ABDOMEN/PELVIS:    HEPATOBILIARY: Unremarkable.    SPLEEN: Unremarkable.    PANCREAS: Unremarkable.    ADRENAL GLANDS: Unremarkable.    KIDNEYS: Symmetric enhancement bilaterally. No evidence of   hydronephrosis. Right upper pole calyceal diverticulum versus cyst.    ABDOMINOPELVIC NODES: Unremarkable.    PELVIC ORGANS: Markedly enlarged bilateral ovaries, better characterized   on recent pelvic ultrasound. Enlarged, fibroid uterus.    PERITONEUM/MESENTERY/BOWEL: Lower abdominal ascites. No obstruction or   intraperitoneal free air.    BONES/SOFT TISSUES: No acute osseous abnormality. Fat-containing   umbilical hernia.    VASCULAR: Left common iliac venous stent.      IMPRESSION:    CHEST:    Acute bilateral pulmonary emboli. Thrombus within the distal right main   pulmonary artery with additional smaller thrombi within the right upper   and lower lobe segmental and subsegmental branches. Left-sided thrombus   within the left upper and lower lobe segmental and subsegmental branches.   No CTA evidence of right heart strain.    Right pleural effusion.    ABDOMEN/PELVIS:    Markedly enlarged bilateral ovaries, which can be seen with ovarian   hyperstimulation syndrome.    Small moderate volume of intra-abdominal ascites within the lower pelvis.

## 2023-07-19 NOTE — DISCHARGE NOTE NURSING/CASE MANAGEMENT/SOCIAL WORK - NSDCFUADDAPPT_GEN_ALL_CORE_FT
Please follow up with your primary care doctor within 1 week after discharge.  Please follow up with the hematologist within 1 week after discharge.   - F/u with OBGYN as scheduled

## 2023-07-19 NOTE — PROGRESS NOTE ADULT - ASSESSMENT
Patient is a 32F PMHx h/o provoked LLE DVT 2/2 compression of left Iliac vein by uterine fibroids in 2021 (s/p stenting and previously on eliquis), h/o infertility and uterine fibroids presents for abdominal pain and shortness of breath following egg retrieval procedure on 7/11/23. Found to have multiple PEs. Admitted to medicine for PE and hyponatremia management.    #Acute b/l PEs  #h/o Provoked LLE DVT 2/2 compression of left iliac vein by uterine fibroid (2021)  - previously on Eliquis no longer taking  - s/p thrombectomy of LLE and s/p stenting by vascular at [facility]  - 7/15 - CTA of Chest showed b/l distal main, segmental and sub-segmental PEs  - PEs possibly 2/2 IVF/Egg retrieval  - started on Lovenox AC  - respiratory status monitored  - TTE 7/16:  1. Left ventricular ejection fraction, by visual estimation, is 50 to 55%. 2. Low-normal global left ventricular systolic function. 3. Moderate-to-severe tricuspid regurgitation. 4. Estimated pulmonary artery systolicpressure is 42.4 mmHg assuming a right atrial pressure of 3 mmHg, which is consistent with mild pulmonary hypertension.  - Keep patient on Lovenox 1 mg/kg  - can follow up with hematology outpatient  - 7/18 GYN consult: recommend correction of any electrolyte abnormalities, recommend PO hydration with electrolytes for tx OHSS, recommend monitor urine output, pain management PRN, avoid NSAIDs if concerned for kidney function, thrombophilia workup ordered   - factor 5 leiden, antithrombin III, protein C, protein S, anticardiolipin Ab, beta 2 glycoprotein 1 Ab ordered, awaiting results  - 7/18 - Hgb 14.6 --> 11.4  - 7/19 - Hgb 10.2. Spoke to OBGYN about this and they reported that this is a good sign as the third spacing from OHSS is likely resolving. Hgb 14 yesterday, 7/18, was likely hemoconcentrated. Patient is cleared for d/c from OBGYN perspective      #Moderate Hyponatremia  - Na 127 on admission --> 7/17 130 (serum) --> 7/18 133  - serum osm 7/16 273, Urine Na 20 (7/17), urine osm 7/17 404  - 7/18- OBGYN team does not want fluid restriction. Hyponatremia mostly likely due to third spacing. Patient should be drinking fluids, monitor strict I's and O's. If urine output < 30 cc's/hour, restart IV fluids with NS  - 7/19 Na 136  - Hyponatremia resolved    #Right Pleural Effusion  - CTA of Chest showed right sided pleural effusion  - satting 97% on RA, not in respiratory distress    #Severe Ovarian Hyperstimulation Syndrome  #Small to Moderate Volume Abd/Pelvic Ascites  #Infertility s/p egg retrieval (7/23)  #Uterine Fibroids  - TVUS showed: Markedly enlarged bilateral ovaries. Vascular flow demonstrated to the ovaries. Small to moderate free pelvic fluid. Uterine fibroids.  - c/w Zofran for nausea  - c/w Morphine 2mg IV q6hrs PRN for severe pain  - holding home Oxycodone 5mg q6hrs PRN    #Constipation  - c/w senna and miralax    #DVT ppx: Lovenox AC  #GI ppx: none  #Diet: Regular  #Activity: AAT  #Code Status: Full Code  #Dispo: home

## 2023-07-19 NOTE — PROGRESS NOTE ADULT - ASSESSMENT
A/P: 31 yo, HD#4 with OHSS s/p egg retrieval on 7/11, with bilateral PEs, currently clinically and hemodynamically stable, clinically improving and OHSS resolving    #PE  -continue therapeutic anticoagulation  -f/u thrombophilia workup    #OHSS  -clinically resolving, labs improved  -avoid fluid restriction  -strict I/Os  -urine output adequate  -keep urine output >30cc/hr, if less, give IVF with NS until adequate  -avoid diuretics   -PO hydration with electrolytes, Gatorade, broth  -pain management PRN, avoid NSAIDs if concerned for kidney function  -GYN to follow, please contact us with any questions [phone]    Dr. Falcon aware and Dr. Angela to be aware   A/P: 31 yo, HD#4 with OHSS s/p egg retrieval on 7/11, with bilateral PEs, currently clinically and hemodynamically stable, clinically improving and OHSS resolving    #PE  -continue therapeutic anticoagulation  -f/u thrombophilia workup  -pt should be referred to hematology outpatient on discharge    #OHSS  -clinically resolving, labs improved. From a GYN perspective, patient is cleared for discharge and can continue any further care related to her resolved OHSS outpatient.   -avoid fluid restriction  -strict I/Os  -urine output adequate  -keep urine output >30cc/hr, if less, give IVF with NS until adequate  -avoid diuretics   -PO hydration with electrolytes, Gatorade, broth  -pain management PRN, avoid NSAIDs if concerned for kidney function  -GYN to follow, please contact us with any questions [phone]    Dr. Falcon aware and Dr. Angela to be aware

## 2023-07-19 NOTE — DISCHARGE NOTE PROVIDER - NSDCFUADDAPPT_GEN_ALL_CORE_FT
Please follow up with your primary care doctor within 1 week after discharge.  Please follow up with the hematologist within 1 week after discharge.  Please follow up with your primary care doctor within 1 week after discharge.  Please follow up with the hematologist within 1 week after discharge.   - F/u with OBGYN as scheduled

## 2023-07-19 NOTE — DISCHARGE NOTE NURSING/CASE MANAGEMENT/SOCIAL WORK - PATIENT PORTAL LINK FT
You can access the FollowMyHealth Patient Portal offered by [facility] by registering at the following website: [email or web address]. By joining [employer]s FollowMyHealth portal, you will also be able to view your health information using other applications (apps) compatible with our system.

## 2023-07-19 NOTE — PROGRESS NOTE ADULT - REASON FOR ADMISSION
SOB and abdominal pain

## 2023-07-19 NOTE — DISCHARGE NOTE PROVIDER - NSDCMRMEDTOKEN_GEN_ALL_CORE_FT
prenatal vitamin: 1 tab(s) orally once a day   Eliquis 5 mg oral tablet: 1 tab(s) orally 2 times a day Start this dose after 7 days of completing the eliquis 10 mg twice daily  Eliquis 5 mg oral tablet: 2 tab(s) orally 2 times a day take 2 tablets ( 10mg) twice daily for 7 days. Then 1 tablet ( 5mg) twice daily  prenatal vitamin: 1 tab(s) orally once a day

## 2023-07-20 LAB — PROT C ACT/NOR PPP: 93 % — SIGNIFICANT CHANGE UP (ref 65–129)

## 2023-07-20 RX ORDER — APIXABAN 2.5 MG/1
2 TABLET, FILM COATED ORAL
Qty: 28 | Refills: 0
Start: 2023-07-20 | End: 2023-07-26

## 2023-07-25 DIAGNOSIS — D25.9 LEIOMYOMA OF UTERUS, UNSPECIFIED: ICD-10-CM

## 2023-07-25 DIAGNOSIS — I26.99 OTHER PULMONARY EMBOLISM WITHOUT ACUTE COR PULMONALE: ICD-10-CM

## 2023-07-25 DIAGNOSIS — Z79.891 LONG TERM (CURRENT) USE OF OPIATE ANALGESIC: ICD-10-CM

## 2023-07-25 DIAGNOSIS — N98.1 HYPERSTIMULATION OF OVARIES: ICD-10-CM

## 2023-07-25 DIAGNOSIS — E87.1 HYPO-OSMOLALITY AND HYPONATREMIA: ICD-10-CM

## 2023-07-25 DIAGNOSIS — N97.9 FEMALE INFERTILITY, UNSPECIFIED: ICD-10-CM

## 2023-07-25 DIAGNOSIS — K59.00 CONSTIPATION, UNSPECIFIED: ICD-10-CM

## 2023-07-25 DIAGNOSIS — J90 PLEURAL EFFUSION, NOT ELSEWHERE CLASSIFIED: ICD-10-CM

## 2023-07-25 DIAGNOSIS — Z86.718 PERSONAL HISTORY OF OTHER VENOUS THROMBOSIS AND EMBOLISM: ICD-10-CM

## 2023-07-25 DIAGNOSIS — R18.8 OTHER ASCITES: ICD-10-CM

## 2023-07-27 RX ORDER — APIXABAN 2.5 MG/1
1 TABLET, FILM COATED ORAL
Qty: 60 | Refills: 0
Start: 2023-07-27 | End: 2023-08-25

## 2023-07-27 NOTE — ASU PATIENT PROFILE, ADULT - PRO INTERPRETER NEED 2
[No Acute Distress] : no acute distress [Well Nourished] : well nourished [Well Developed] : well developed [No Resp Distress] : no resp distress [No Acc Muscle Use] : no acc muscle use [Clear to Auscultation Bilaterally] : clear to auscultation bilaterally [Kyphosis] : kyphosis [TextBox_132] : ambulates with cane  [TextBox_140] : orientated to person/place; poor historian  English

## 2023-08-07 NOTE — ED ADULT NURSE NOTE - PAIN RATING/NUMBER SCALE (0-10): REST
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   ----hyponatremia in the setting of tripple diuretic therapy and concurrent long standing desmopressin therapy. Patient's serum Na and urine osmolality are being followed off medications that directly affect sodium excretion to decide if DDAVP analog is indicated for the patient.   ----cardiac valvular abnormalities and dCHF, Cardiology input appreciated given that diuretics are being held.   ----for dispo, pt resides at home with assistance from aids 24/7.

## 2023-08-10 ENCOUNTER — APPOINTMENT (OUTPATIENT)
Dept: CARDIOLOGY | Facility: CLINIC | Age: 33
End: 2023-08-10

## 2023-08-10 ENCOUNTER — APPOINTMENT (OUTPATIENT)
Dept: CARDIOTHORACIC SURGERY | Facility: CLINIC | Age: 33
End: 2023-08-10
Payer: COMMERCIAL

## 2023-08-10 VITALS
DIASTOLIC BLOOD PRESSURE: 76 MMHG | HEIGHT: 66 IN | SYSTOLIC BLOOD PRESSURE: 108 MMHG | BODY MASS INDEX: 30.53 KG/M2 | TEMPERATURE: 98 F | WEIGHT: 190 LBS | RESPIRATION RATE: 12 BRPM | HEART RATE: 81 BPM | OXYGEN SATURATION: 96 %

## 2023-08-10 PROCEDURE — 99243 OFF/OP CNSLTJ NEW/EST LOW 30: CPT

## 2023-08-10 RX ORDER — IRON/IRON ASP GLY/FA/MV-MIN 38 125-25-1MG
TABLET ORAL
Refills: 0 | Status: DISCONTINUED | COMMUNITY
End: 2023-08-10

## 2023-08-10 RX ORDER — NORETHINDRONE ACETATE 5 MG/1
TABLET ORAL
Refills: 0 | Status: DISCONTINUED | COMMUNITY
End: 2023-08-10

## 2023-08-10 RX ORDER — PRENATAL VIT NO.130/IRON/FOLIC 27MG-0.8MG
27-0.8 TABLET ORAL DAILY
Qty: 30 | Refills: 6 | Status: DISCONTINUED | COMMUNITY
Start: 2018-07-31 | End: 2023-08-10

## 2023-08-10 RX ORDER — DOCUSATE SODIUM 100 MG/1
CAPSULE ORAL
Refills: 0 | Status: DISCONTINUED | COMMUNITY
End: 2023-08-10

## 2023-08-10 NOTE — PHYSICAL EXAM
[General Appearance - Alert] : alert [Sclera] : the sclera and conjunctiva were normal [Outer Ear] : the ears and nose were normal in appearance [Neck Appearance] : the appearance of the neck was normal [Neck Cervical Mass (___cm)] : no neck mass was observed [Respiration, Rhythm And Depth] : normal respiratory rhythm and effort [Exaggerated Use Of Accessory Muscles For Inspiration] : no accessory muscle use [Apical Impulse] : the apical impulse was normal [Heart Rate And Rhythm] : heart rate was normal and rhythm regular [Heart Sounds] : normal S1 and S2 [Examination Of The Chest] : the chest was normal in appearance [Bowel Sounds] : normal bowel sounds [Abdomen Soft] : soft [Abdomen Tenderness] : non-tender [Abnormal Walk] : normal gait [Skin Color & Pigmentation] : normal skin color and pigmentation [Skin Turgor] : normal skin turgor [] : no rash [Cranial Nerves] : cranial nerves 2-12 were intact [Deep Tendon Reflexes (DTR)] : deep tendon reflexes were 2+ and symmetric [Sensation] : the sensory exam was normal to light touch and pinprick [Oriented To Time, Place, And Person] : oriented to person, place, and time [Impaired Insight] : insight and judgment were intact [Affect] : the affect was normal

## 2023-08-14 NOTE — HISTORY OF PRESENT ILLNESS
[FreeTextEntry1] : Ms. CEASAR QUINTERO is a 32 year F, never smoker, that arrives today for a consultation for moderate to severe tricuspid regurgitation found in ER after having a Pulmonary Embolism following an egg retrieval for infertility. Patient was seen by their PMD for hospital follow up and was sent to use for evaluation after her Echocardiogram.  PMH provoked LLE DVT 2/2 compression of left Iliac vein by uterine fibroids in 2021 (s/p stenting and previously on Eliquis), h/o infertility and uterine fibroids presents for abdominal pain and shortness of breath following egg retrieval procedure on 7/11/23. Found to have multiple PEs.  ECOG 0 Fully Independent  Never Smoker Denies major psychiatric history Jehova Witness Born in [place]   Their Healthcare team is as follows: PMD: Dr. Brice Mtz Cardiologist: N/A  Vascular: Dr. Smith Hematologist: Dr. Nieto

## 2023-08-14 NOTE — REVIEW OF SYSTEMS
[Negative] : Heme/Lymph [Fever] : no fever [Chills] : no chills [Feeling Poorly] : not feeling poorly [Feeling Tired] : not feeling tired [Heart Rate Is Slow] : the heart rate was not slow [Heart Rate Is Fast] : the heart rate was not fast [Chest Pain] : no chest pain [Palpitations] : no palpitations [Leg Claudication] : no intermittent leg claudication [Lower Ext Edema] : no lower extremity edema [Shortness Of Breath] : no shortness of breath [Wheezing] : no wheezing [Cough] : no cough [SOB on Exertion] : no shortness of breath during exertion [Abdominal Pain] : no abdominal pain [Vomiting] : no vomiting [Constipation] : no constipation [Diarrhea] : no diarrhea

## 2023-08-14 NOTE — ASSESSMENT
[FreeTextEntry1] : Ms. CEASAR QUINTERO is a 32 year F, never smoker, that arrives today for a consultation for moderate to severe tricuspid regurgitation found in ER after having a Pulmonary Embolism following an egg retrieval for infertility.  Here for discussion of tricuspid regurgitation.  Plan: Reviewed recent echocardiogram with patient She has 0 symptoms at this time regarding her heart Echocardiogram in 6 months  F/U after review F/U with Dr Smith F/U with [facility], am acting as scribe for    Patient seen and examined History and physical as per above Briefly, 32 year-old woman with moderate to severe TR in setting of recent PE No symptoms RTC in 6 months for TTE  I, Dr. Ureña, saw, examined, and reviewed the diagnostic images with the patient and agree with my nurse practitioners clinic note, physical exam findings, and treatment plan.  Anderson Ureañ MD

## 2023-08-18 ENCOUNTER — APPOINTMENT (OUTPATIENT)
Dept: OPHTHALMOLOGY | Facility: CLINIC | Age: 33
End: 2023-08-18

## 2023-08-18 ENCOUNTER — APPOINTMENT (OUTPATIENT)
Dept: INTERNAL MEDICINE | Facility: CLINIC | Age: 33
End: 2023-08-18

## 2023-08-21 ENCOUNTER — APPOINTMENT (OUTPATIENT)
Dept: HEMATOLOGY ONCOLOGY | Facility: CLINIC | Age: 33
End: 2023-08-21
Payer: COMMERCIAL

## 2023-08-21 ENCOUNTER — OUTPATIENT (OUTPATIENT)
Dept: OUTPATIENT SERVICES | Facility: HOSPITAL | Age: 33
LOS: 1 days | End: 2023-08-21
Payer: COMMERCIAL

## 2023-08-21 ENCOUNTER — LABORATORY RESULT (OUTPATIENT)
Age: 33
End: 2023-08-21

## 2023-08-21 DIAGNOSIS — I82.409 ACUTE EMBOLISM AND THROMBOSIS OF UNSPECIFIED DEEP VEINS OF UNSPECIFIED LOWER EXTREMITY: ICD-10-CM

## 2023-08-21 DIAGNOSIS — Z98.890 OTHER SPECIFIED POSTPROCEDURAL STATES: Chronic | ICD-10-CM

## 2023-08-21 DIAGNOSIS — M79.89 OTHER SPECIFIED SOFT TISSUE DISORDERS: ICD-10-CM

## 2023-08-21 LAB
HCT VFR BLD CALC: 36.7 %
HGB BLD-MCNC: 11.9 G/DL
MCHC RBC-ENTMCNC: 26.5 PG
MCHC RBC-ENTMCNC: 32.4 G/DL
MCV RBC AUTO: 81.7 FL
PLATELET # BLD AUTO: 243 K/UL
PMV BLD: 10.9 FL
RBC # BLD: 4.49 M/UL
RBC # FLD: 13.2 %
WBC # FLD AUTO: 4.35 K/UL

## 2023-08-21 PROCEDURE — 85300 ANTITHROMBIN III ACTIVITY: CPT

## 2023-08-21 PROCEDURE — G0452: CPT | Mod: 26

## 2023-08-21 PROCEDURE — 99244 OFF/OP CNSLTJ NEW/EST MOD 40: CPT

## 2023-08-21 PROCEDURE — 81240 F2 GENE: CPT

## 2023-08-21 PROCEDURE — 86146 BETA-2 GLYCOPROTEIN ANTIBODY: CPT

## 2023-08-21 PROCEDURE — 85303 CLOT INHIBIT PROT C ACTIVITY: CPT

## 2023-08-21 PROCEDURE — 83921 ORGANIC ACID SINGLE QUANT: CPT

## 2023-08-21 PROCEDURE — 83090 ASSAY OF HOMOCYSTEINE: CPT

## 2023-08-21 PROCEDURE — 83550 IRON BINDING TEST: CPT

## 2023-08-21 PROCEDURE — 83540 ASSAY OF IRON: CPT

## 2023-08-21 PROCEDURE — 85306 CLOT INHIBIT PROT S FREE: CPT

## 2023-08-21 PROCEDURE — 86147 CARDIOLIPIN ANTIBODY EA IG: CPT

## 2023-08-21 PROCEDURE — 81241 F5 GENE: CPT

## 2023-08-21 PROCEDURE — 80053 COMPREHEN METABOLIC PANEL: CPT

## 2023-08-21 PROCEDURE — 82728 ASSAY OF FERRITIN: CPT

## 2023-08-21 PROCEDURE — 85730 THROMBOPLASTIN TIME PARTIAL: CPT

## 2023-08-21 PROCEDURE — 85613 RUSSELL VIPER VENOM DILUTED: CPT

## 2023-08-21 PROCEDURE — 82607 VITAMIN B-12: CPT

## 2023-08-21 PROCEDURE — 85027 COMPLETE CBC AUTOMATED: CPT

## 2023-08-21 PROCEDURE — 82746 ASSAY OF FOLIC ACID SERUM: CPT

## 2023-08-21 PROCEDURE — 99204 OFFICE O/P NEW MOD 45 MIN: CPT

## 2023-08-22 DIAGNOSIS — I82.409 ACUTE EMBOLISM AND THROMBOSIS OF UNSPECIFIED DEEP VEINS OF UNSPECIFIED LOWER EXTREMITY: ICD-10-CM

## 2023-08-22 LAB
ALBUMIN SERPL ELPH-MCNC: 4.7 G/DL
ALP BLD-CCNC: 63 U/L
ALT SERPL-CCNC: 8 U/L
ANION GAP SERPL CALC-SCNC: 8 MMOL/L
AST SERPL-CCNC: 13 U/L
AT III PPP CHRO-ACNC: 103 %
BILIRUB SERPL-MCNC: 0.2 MG/DL
BUN SERPL-MCNC: 11 MG/DL
CALCIUM SERPL-MCNC: 9.6 MG/DL
CHLORIDE SERPL-SCNC: 104 MMOL/L
CO2 SERPL-SCNC: 27 MMOL/L
CREAT SERPL-MCNC: 0.7 MG/DL
EGFR: 116 ML/MIN/1.73M2
FERRITIN SERPL-MCNC: 93 NG/ML
FOLATE SERPL-MCNC: 16 NG/ML
GLUCOSE SERPL-MCNC: 83 MG/DL
IRON SATN MFR SERPL: 10 %
IRON SERPL-MCNC: 34 UG/DL
POTASSIUM SERPL-SCNC: 4.4 MMOL/L
PROT SERPL-MCNC: 7 G/DL
SODIUM SERPL-SCNC: 139 MMOL/L
TIBC SERPL-MCNC: 324 UG/DL
UIBC SERPL-MCNC: 290 UG/DL
VIT B12 SERPL-MCNC: 594 PG/ML

## 2023-08-22 NOTE — ASSESSMENT
[FreeTextEntry1] : Ms Whitley is a 32 years old emale who is Ca[employer]ism sent to us for consultation of her new PE.  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 first was likely provoked from the venous compression from the fibroid as per the chart review. Second event of PE was secondary to ovarian hyperstimulation syndrome due to IVF treatment. - denies any other history of blood clots, family Hx of blood clots. this makes underlying thrombophilia disorder less likely. - she is compliant and continues to take Eliquis 5 mg Q12 hrs  Recommendations:  Given to major VTE events, first one being limb threatening and second even leading to pulm HTN (likely causing Tricuspid regurge), I advised her to continue anticoagulation for lifetime. She has questions if she could go for IVF egg retrieval again to which I told her she would be at high risk of thromboembolism so she should weigh the risks vs benefits.   Plan:  - c/w Eliquis 5 mg Q12 hrs - we will check baseline labs CBC, CMP - will check Iron profile, B12, Folate, MMA levels.  - will check hypercoagulability work up : Antithrombin III mutation, Prothrombin gene mut, Factor V Leiden Mut, protein C and S activity, APLS panel - Follow up with vascular Sx as scheduled - Pulmonary follow up for Pulm HTN 2/2 CTEPH? - Her Rx for eliquis was renewed for 30 days - we will call her with abnormal lab results  RTC in 4 weeks to discuss the results

## 2023-08-22 NOTE — HISTORY OF PRESENT ILLNESS
[de-identified] : Ms Whitley is a 32 years old emale who is [employer] sent to us for consultation of her new PE.  Her History goes  back to 2021 when she was undergoing treatment for IVF and ovulation induction that she was found t have DVT in her left leg. 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 She was dicharged on Eliquis and she took it for 6 months. She had enlarged fibroid, and the clot was attributed to the venous compression from it. In 2022 she went to [facility] for the fibroid removal. In July 2023 she was again undergoing treatment for egg retrieval, while she was getting treatment, she developed SOB and abdominal distension. 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 A CTA was done that revealed pulmonary embolism without Rt heart strain. 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 She was discharged home on Eliquis.  She is recovering well and denies any SOB, cough, fever or chills. She denies any prior history of blood clots other than explained above. Denies history of miscarriages, use of hormones or OCPs. Denies family history of blood clots. Denies any history of menorrhagia.   She has been to our clinic in 2019 for anemia after her myomectomy surgery and was treated with iron infusions and B12 Inj.

## 2023-08-23 LAB
B2 GLYCOPROT1 IGA SERPL IA-ACNC: <5 SAU
B2 GLYCOPROT1 IGG SER-ACNC: <5 SGU
B2 GLYCOPROT1 IGM SER-ACNC: <5 SMU
CARDIOLIPIN IGM SER-MCNC: <5 GPL
CARDIOLIPIN IGM SER-MCNC: <5 MPL

## 2023-08-24 LAB
CARDIOLIPIN AB SER IA-ACNC: NEGATIVE
PROT C PPP CHRO-ACNC: 92 %

## 2023-08-25 LAB
DNA PLOIDY SPEC FC-IMP: NORMAL
HOMOCYSTEINE LEVEL: 7.3 UMOL/L
METHYLMALONIC ACID LEVEL: 102 NMOL/L
PTR INTERP: NORMAL

## 2023-08-28 LAB — PROT S FREE PPP-ACNC: 81 %

## 2023-08-30 ENCOUNTER — APPOINTMENT (OUTPATIENT)
Dept: PULMONOLOGY | Facility: CLINIC | Age: 33
End: 2023-08-30
Payer: COMMERCIAL

## 2023-08-30 VITALS
HEIGHT: 66 IN | DIASTOLIC BLOOD PRESSURE: 74 MMHG | BODY MASS INDEX: 30.22 KG/M2 | OXYGEN SATURATION: 97 % | WEIGHT: 188 LBS | SYSTOLIC BLOOD PRESSURE: 112 MMHG | HEART RATE: 90 BPM | RESPIRATION RATE: 14 BRPM

## 2023-08-30 PROCEDURE — 71046 X-RAY EXAM CHEST 2 VIEWS: CPT

## 2023-08-30 PROCEDURE — 99204 OFFICE O/P NEW MOD 45 MIN: CPT | Mod: 25

## 2023-08-30 NOTE — REASON FOR VISIT
[Initial] : an initial visit [Pulmonary Embolism] : pulmonary embolism [Shortness of Breath] : shortness of breath [TextBox_44] : Pleural effusion

## 2023-08-30 NOTE — PROCEDURE
[FreeTextEntry1] : CXR PA and Lateral  The costophrenic and cardiophrenic angles are sharp The sivakumar parenchyma shows no infiltrates, consolidations, or nodules  The Mediastinum is within normal limits.  No pleural effusions

## 2023-08-30 NOTE — HISTORY OF PRESENT ILLNESS
[Initial Evaluation] : an initial evaluation of [Shortness of Breath] : shortness of breath [Cough] : no cough [Orthopnea] : no orthopnea [Fever] : no fever [Palpitations] : no palpitations [Anxiety] : no anxiety [Syncope] : no syncope [Currently Experiencing] : The patient is currently experiencing symptoms.

## 2023-08-30 NOTE — ASSESSMENT
[FreeTextEntry1] : SP Recent DX of PE in the setting of HSS Infertility was undergoing fertility treatment. On Eliquis.  SP HEm onc and CTS evaluation  Right pleural effusion resolved.  Likely HSS

## 2023-09-18 ENCOUNTER — OUTPATIENT (OUTPATIENT)
Dept: OUTPATIENT SERVICES | Facility: HOSPITAL | Age: 33
LOS: 1 days | End: 2023-09-18
Payer: COMMERCIAL

## 2023-09-18 ENCOUNTER — APPOINTMENT (OUTPATIENT)
Dept: HEMATOLOGY ONCOLOGY | Facility: CLINIC | Age: 33
End: 2023-09-18
Payer: COMMERCIAL

## 2023-09-18 VITALS
SYSTOLIC BLOOD PRESSURE: 114 MMHG | BODY MASS INDEX: 31.82 KG/M2 | HEIGHT: 66 IN | WEIGHT: 198 LBS | RESPIRATION RATE: 14 BRPM | HEART RATE: 80 BPM | DIASTOLIC BLOOD PRESSURE: 61 MMHG | TEMPERATURE: 98.4 F

## 2023-09-18 DIAGNOSIS — Z86.718 PERSONAL HISTORY OF OTHER VENOUS THROMBOSIS AND EMBOLISM: ICD-10-CM

## 2023-09-18 DIAGNOSIS — I26.09 OTHER PULMONARY EMBOLISM WITH ACUTE COR PULMONALE: ICD-10-CM

## 2023-09-18 DIAGNOSIS — I82.409 ACUTE EMBOLISM AND THROMBOSIS OF UNSPECIFIED DEEP VEINS OF UNSPECIFIED LOWER EXTREMITY: ICD-10-CM

## 2023-09-18 DIAGNOSIS — Z98.890 OTHER SPECIFIED POSTPROCEDURAL STATES: Chronic | ICD-10-CM

## 2023-09-18 PROCEDURE — 99214 OFFICE O/P EST MOD 30 MIN: CPT

## 2023-09-18 PROCEDURE — G0463: CPT

## 2023-10-03 RX ORDER — APIXABAN 5 MG/1
5 TABLET, FILM COATED ORAL TWICE DAILY
Qty: 60 | Refills: 0 | Status: ACTIVE | COMMUNITY
Start: 2021-09-22

## 2023-11-08 ENCOUNTER — APPOINTMENT (OUTPATIENT)
Dept: PULMONOLOGY | Facility: CLINIC | Age: 33
End: 2023-11-08
Payer: COMMERCIAL

## 2023-11-08 VITALS
RESPIRATION RATE: 14 BRPM | HEIGHT: 66 IN | BODY MASS INDEX: 30.53 KG/M2 | WEIGHT: 190 LBS | HEART RATE: 84 BPM | SYSTOLIC BLOOD PRESSURE: 130 MMHG | OXYGEN SATURATION: 99 % | DIASTOLIC BLOOD PRESSURE: 76 MMHG

## 2023-11-08 PROCEDURE — 99213 OFFICE O/P EST LOW 20 MIN: CPT

## 2023-11-13 ENCOUNTER — APPOINTMENT (OUTPATIENT)
Dept: PULMONOLOGY | Facility: HOSPITAL | Age: 33
End: 2023-11-13

## 2023-12-27 DIAGNOSIS — I07.1 RHEUMATIC TRICUSPID INSUFFICIENCY: ICD-10-CM

## 2024-01-31 NOTE — DISCHARGE NOTE PROVIDER - NSDCCPCAREPLAN_GEN_ALL_CORE_FT
PRINCIPAL DISCHARGE DIAGNOSIS  Diagnosis: Pulmonary embolism  Assessment and Plan of Treatment: you presented for shortness of breath. CT scan of the lung showed bilateral pulmonary embolus. You were followed by OBGYN doctor and started on lovenox which is a blood thinner.   Please follow up with your primary care doctor within 1 week after discharge.      SECONDARY DISCHARGE DIAGNOSES  Diagnosis: Ovarian hyperstimulation syndrome  Assessment and Plan of Treatment: Ovarian hyperstimulation syndrome is a potentially serious complication of fertility treatment, particularly of in vitro fertilisation (IVF).  It is normal to have some mild discomfort after egg collection. If you are worried or develop any of the symptoms below, you should seek medical advice.  OHSS can range from mild to severe:   Mild OHSS – mild abdominal swelling, discomfort and nausea.  Moderate OHSS – symptoms of mild OHSS, but the swelling is worse because of fluid build-up in the abdomen. This can cause abdominal pain and vomiting.  Severe OHSS – symptoms of moderate OHSS with extreme thirst and dehydration. You may only pass small amounts of urine which is dark in colour and/or you may experience difficulty breathing because of a build-up of fluid in your chest. A serious, but rare, complication is formation of a blood clot (thrombosis) in the legs or lungs. The symptoms of this are a swollen, tender leg or pain in your chest and breathlessness. You should report any unusual symptoms to your doctor.    
none

## 2024-02-07 ENCOUNTER — APPOINTMENT (OUTPATIENT)
Dept: CARDIOLOGY | Facility: CLINIC | Age: 34
End: 2024-02-07

## 2024-02-08 ENCOUNTER — APPOINTMENT (OUTPATIENT)
Dept: CARDIOLOGY | Facility: CLINIC | Age: 34
End: 2024-02-08

## 2024-02-09 ENCOUNTER — NON-APPOINTMENT (OUTPATIENT)
Age: 34
End: 2024-02-09

## 2024-02-23 ENCOUNTER — APPOINTMENT (OUTPATIENT)
Dept: PULMONOLOGY | Facility: CLINIC | Age: 34
End: 2024-02-23

## 2024-02-26 ENCOUNTER — NON-APPOINTMENT (OUTPATIENT)
Age: 34
End: 2024-02-26

## 2024-03-08 ENCOUNTER — APPOINTMENT (OUTPATIENT)
Dept: CARDIOLOGY | Facility: CLINIC | Age: 34
End: 2024-03-08

## 2024-04-29 ENCOUNTER — APPOINTMENT (OUTPATIENT)
Dept: PULMONOLOGY | Facility: CLINIC | Age: 34
End: 2024-04-29
Payer: COMMERCIAL

## 2024-04-29 PROCEDURE — 94727 GAS DIL/WSHOT DETER LNG VOL: CPT

## 2024-04-29 PROCEDURE — 94729 DIFFUSING CAPACITY: CPT

## 2024-04-29 PROCEDURE — 94010 BREATHING CAPACITY TEST: CPT

## 2024-05-29 ENCOUNTER — APPOINTMENT (OUTPATIENT)
Dept: PULMONOLOGY | Facility: CLINIC | Age: 34
End: 2024-05-29
Payer: COMMERCIAL

## 2024-05-29 DIAGNOSIS — I26.99 OTHER PULMONARY EMBOLISM W/OUT ACUTE COR PULMONALE: ICD-10-CM

## 2024-05-29 PROCEDURE — 99213 OFFICE O/P EST LOW 20 MIN: CPT

## 2024-05-29 NOTE — ASSESSMENT
[FreeTextEntry1] : PE in the setting of HSS Infertility was undergoing fertility treatment. On Eliquis. SP HEm onc and CTS evaluation Right pleural effusion resolved. Likely HSS.

## 2024-05-29 NOTE — REASON FOR VISIT
[Follow-Up] : a follow-up visit [Pulmonary Embolism] : pulmonary embolism [Shortness of Breath] : shortness of breath [TextBox_44] : Pleural effusion

## 2024-05-29 NOTE — HISTORY OF PRESENT ILLNESS
[Initial Evaluation] : an initial evaluation of [Shortness of Breath] : shortness of breath [Cough] : no cough [Orthopnea] : no orthopnea [Fever] : no fever [Palpitations] : no palpitations [Anxiety] : no anxiety [Syncope] : no syncope [Currently Experiencing] : The patient is currently experiencing symptoms. [Home] : at home, [unfilled] , at the time of the visit. [Medical Office: ([facility])___] : at the medical office located in  [Verbal consent obtained from patient] : the patient, [unfilled]

## 2024-09-16 ENCOUNTER — OUTPATIENT (OUTPATIENT)
Dept: OUTPATIENT SERVICES | Facility: HOSPITAL | Age: 34
LOS: 1 days | End: 2024-09-16
Payer: COMMERCIAL

## 2024-09-16 ENCOUNTER — APPOINTMENT (OUTPATIENT)
Dept: HEMATOLOGY ONCOLOGY | Facility: CLINIC | Age: 34
End: 2024-09-16

## 2024-09-16 ENCOUNTER — APPOINTMENT (OUTPATIENT)
Age: 34
End: 2024-09-16
Payer: COMMERCIAL

## 2024-09-16 VITALS
SYSTOLIC BLOOD PRESSURE: 108 MMHG | BODY MASS INDEX: 31.82 KG/M2 | WEIGHT: 198 LBS | HEART RATE: 74 BPM | RESPIRATION RATE: 17 BRPM | DIASTOLIC BLOOD PRESSURE: 74 MMHG | HEIGHT: 66 IN | TEMPERATURE: 98.1 F | OXYGEN SATURATION: 100 %

## 2024-09-16 DIAGNOSIS — Z98.890 OTHER SPECIFIED POSTPROCEDURAL STATES: Chronic | ICD-10-CM

## 2024-09-16 DIAGNOSIS — Z86.718 PERSONAL HISTORY OF OTHER VENOUS THROMBOSIS AND EMBOLISM: ICD-10-CM

## 2024-09-16 DIAGNOSIS — D64.9 ANEMIA, UNSPECIFIED: ICD-10-CM

## 2024-09-16 DIAGNOSIS — I26.99 OTHER PULMONARY EMBOLISM W/OUT ACUTE COR PULMONALE: ICD-10-CM

## 2024-09-16 PROCEDURE — 99214 OFFICE O/P EST MOD 30 MIN: CPT

## 2024-09-16 PROCEDURE — G2211 COMPLEX E/M VISIT ADD ON: CPT

## 2024-09-17 DIAGNOSIS — D64.9 ANEMIA, UNSPECIFIED: ICD-10-CM

## 2024-09-20 NOTE — HISTORY OF PRESENT ILLNESS
[de-identified] : Ms Whitley is a 32 years old emale who is Jainism sent to us for consultation of her new PE.  Her History goes  back to 2021 when she was undergoing treatment for IVF and ovulation induction that she was found t have DVT in her left leg. 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 She was dicharged on Eliquis and she took it for 6 months. She had enlarged fibroid, and the clot was attributed to the venous compression from it. In 2022 she went to [facility] for the fibroid removal. In July 2023 she was again undergoing treatment for egg retrieval, while she was getting treatment, she developed SOB and abdominal distension. 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 A CTA was done that revealed pulmonary embolism without Rt heart strain. 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 She was discharged home on Eliquis.  She is recovering well and denies any SOB, cough, fever or chills. She denies any prior history of blood clots other than explained above. Denies history of miscarriages, use of hormones or OCPs. Denies family history of blood clots. Denies any history of menorrhagia.   She has been to our clinic in 2019 for anemia after her myomectomy surgery and was treated with iron infusions and B12 Inj.   [de-identified] : 9/18/23: Patient is here for follow up. She feels well and has no complaints. She continues to take Eliquis. All the testing we did last time was grossly normal.   9/16/24 Pt is here for follow up. Feels well. wants to DC AC

## 2024-09-20 NOTE — ASSESSMENT
[FreeTextEntry1] : Ms Whitley is a 32 years old emale who is [employer]  with H/o PE.  # Hx of Left leg Phlegmasia cerulea dolens s/p thrombectomy and stent placement (2021) # Hx of Acute PE on infertility treatment/egg retrieval therapy (July 2023) # [employer]  - Pt had two events of VTEs. first was likely provoked from the venous compression from the fibroid as per the chart review. Second event of PE was secondary to ovarian hyperstimulation syndrome due to IVF treatment. - denies any other history of blood clots, family Hx of blood clots. this makes underlying thrombophilia disorder less likely. - she is compliant and continues to take Eliquis 5 mg Q12 hrs She has not had any surgery for fibroid yet, which could have been a provoking factor for her venous thromboembolism Recommendations:  Given to major VTE events, first one being limb threatening and second even leading to pulm HTN (likely causing Tricuspid regurge), I advised her to continue anticoagulation for lifetime. She has questions if she could go for IVF egg retrieval again to which I told her she would be at high risk of thromboembolism so she should weigh the risks vs benefits.   Plan:  - c/w Eliquis 5 mg Q12 hrs. I am not in agreement with DC anticoagulation. I can send her for a second opinion with a coag specialist. She should consider treatment for the fibroid uterus as it might have been the cause of VTE - Hypercoagulability work up was negative including: Antithrombin III mutation, Prothrombin gene mut, Factor V Leiden Mut, protein C and S activity, APLS panel - Follow up with vascular Sx as scheduled - Pulmonary follow up for Pulm HTN 2/2 CTEPH?  RTC in 1 year or early as needed.

## 2024-09-20 NOTE — HISTORY OF PRESENT ILLNESS
[de-identified] : Ms Whitley is a 32 years old emale who is Jew sent to us for consultation of her new PE.  Her History goes  back to 2021 when she was undergoing treatment for IVF and ovulation induction that she was found t have DVT in her left leg. 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 She was dicharged on Eliquis and she took it for 6 months. She had enlarged fibroid, and the clot was attributed to the venous compression from it. In 2022 she went to [facility] for the fibroid removal. In July 2023 she was again undergoing treatment for egg retrieval, while she was getting treatment, she developed SOB and abdominal distension. 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 A CTA was done that revealed pulmonary embolism without Rt heart strain. 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 She was discharged home on Eliquis.  She is recovering well and denies any SOB, cough, fever or chills. She denies any prior history of blood clots other than explained above. Denies history of miscarriages, use of hormones or OCPs. Denies family history of blood clots. Denies any history of menorrhagia.   She has been to our clinic in 2019 for anemia after her myomectomy surgery and was treated with iron infusions and B12 Inj.   [de-identified] : 9/18/23: Patient is here for follow up. She feels well and has no complaints. She continues to take Eliquis. All the testing we did last time was grossly normal.   9/16/24 Pt is here for follow up. Feels well. wants to DC AC

## 2024-09-20 NOTE — REASON FOR VISIT
[Follow-Up Visit] : a follow-up [Initial Consultation] : an initial consultation for [FreeTextEntry2] : DVT/PE

## 2024-09-20 NOTE — ASSESSMENT
[FreeTextEntry1] : Ms Whitley is a 32 years old emale who is Jew  with H/o PE.  # Hx of Left leg Phlegmasia cerulea dolens s/p thrombectomy and stent placement (2021) # Hx of Acute PE on infertility treatment/egg retrieval therapy (July 2023) # Jew  - Pt had two events of VTEs. first was likely provoked from the venous compression from the fibroid as per the chart review. Second event of PE was secondary to ovarian hyperstimulation syndrome due to IVF treatment. - denies any other history of blood clots, family Hx of blood clots. this makes underlying thrombophilia disorder less likely. - she is compliant and continues to take Eliquis 5 mg Q12 hrs She has not had any surgery for fibroid yet, which could have been a provoking factor for her venous thromboembolism Recommendations:  Given to major VTE events, first one being limb threatening and second even leading to pulm HTN (likely causing Tricuspid regurge), I advised her to continue anticoagulation for lifetime. She has questions if she could go for IVF egg retrieval again to which I told her she would be at high risk of thromboembolism so she should weigh the risks vs benefits.   Plan:  - c/w Eliquis 5 mg Q12 hrs. I am not in agreement with DC anticoagulation. I can send her for a second opinion with a coag specialist. She should consider treatment for the fibroid uterus as it might have been the cause of VTE - Hypercoagulability work up was negative including: Antithrombin III mutation, Prothrombin gene mut, Factor V Leiden Mut, protein C and S activity, APLS panel - Follow up with vascular Sx as scheduled - Pulmonary follow up for Pulm HTN 2/2 CTEPH?  RTC in 1 year or early as needed.

## 2024-09-24 ENCOUNTER — APPOINTMENT (OUTPATIENT)
Age: 34
End: 2024-09-24

## 2024-10-01 ENCOUNTER — APPOINTMENT (OUTPATIENT)
Age: 34
End: 2024-10-01
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
